# Patient Record
Sex: FEMALE | Race: WHITE
[De-identification: names, ages, dates, MRNs, and addresses within clinical notes are randomized per-mention and may not be internally consistent; named-entity substitution may affect disease eponyms.]

---
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
## 2019-09-18 ENCOUNTER — LAB REQUISITION (OUTPATIENT)
Dept: ADMINISTRATIVE | Age: 19
End: 2019-09-18
Payer: COMMERCIAL

## 2019-09-18 DIAGNOSIS — F33.2 SEVERE RECURRENT MAJOR DEPRESSION WITHOUT PSYCHOTIC FEATURES (HCC): ICD-10-CM

## 2019-09-18 DIAGNOSIS — F50.9 EATING DISORDER, UNSPECIFIED TYPE: ICD-10-CM

## 2019-09-18 LAB
B-HCG UR QL: NEGATIVE
BILIRUB UR QL: NEGATIVE
CLARITY UR: CLEAR
COLOR UR: YELLOW
GLUCOSE UR-MCNC: NEGATIVE MG/DL
KETONES UR-MCNC: NEGATIVE MG/DL
LEUKOCYTE ESTERASE UR QL STRIP.AUTO: NEGATIVE
NITRITE UR QL STRIP.AUTO: NEGATIVE
PH UR: 6 [PH] (ref 5–8)
PROT UR-MCNC: NEGATIVE MG/DL
RBC #/AREA URNS AUTO: <1 /HPF
SP GR UR STRIP: 1.02 (ref 1–1.03)
UROBILINOGEN UR STRIP-ACNC: <2
WBC #/AREA URNS AUTO: 1 /HPF

## 2019-09-18 PROCEDURE — 81025 URINE PREGNANCY TEST: CPT | Performed by: OTHER

## 2019-09-18 PROCEDURE — 81001 URINALYSIS AUTO W/SCOPE: CPT | Performed by: OTHER

## 2019-09-19 ENCOUNTER — LAB REQUISITION (OUTPATIENT)
Dept: ADMINISTRATIVE | Age: 19
End: 2019-09-19
Payer: COMMERCIAL

## 2019-09-19 DIAGNOSIS — F50.9 EATING DISORDER: ICD-10-CM

## 2019-09-19 LAB
ALBUMIN SERPL-MCNC: 3.7 G/DL (ref 3.4–5)
ALBUMIN/GLOB SERPL: 1 {RATIO} (ref 1–2)
ALP LIVER SERPL-CCNC: 93 U/L (ref 52–144)
ALT SERPL-CCNC: 26 U/L (ref 13–56)
AMYLASE SERPL-CCNC: 48 U/L (ref 25–115)
ANION GAP SERPL CALC-SCNC: 6 MMOL/L (ref 0–18)
AST SERPL-CCNC: 16 U/L (ref 15–37)
BASOPHILS # BLD AUTO: 0.04 X10(3) UL (ref 0–0.2)
BASOPHILS NFR BLD AUTO: 0.5 %
BILIRUB SERPL-MCNC: 0.3 MG/DL (ref 0.1–2)
BUN BLD-MCNC: 15 MG/DL (ref 7–18)
BUN/CREAT SERPL: 14.4 (ref 10–20)
CALCIUM BLD-MCNC: 9 MG/DL (ref 8.5–10.1)
CHLORIDE SERPL-SCNC: 108 MMOL/L (ref 98–112)
CO2 SERPL-SCNC: 28 MMOL/L (ref 21–32)
CREAT BLD-MCNC: 1.04 MG/DL (ref 0.55–1.02)
DEPRECATED RDW RBC AUTO: 39 FL (ref 35.1–46.3)
EOSINOPHIL # BLD AUTO: 0.24 X10(3) UL (ref 0–0.7)
EOSINOPHIL NFR BLD AUTO: 3.2 %
ERYTHROCYTE [DISTWIDTH] IN BLOOD BY AUTOMATED COUNT: 12.6 % (ref 11–15)
GLOBULIN PLAS-MCNC: 3.7 G/DL (ref 2.8–4.4)
GLUCOSE BLD-MCNC: 88 MG/DL (ref 70–99)
HAV IGM SER QL: 2.2 MG/DL (ref 1.6–2.6)
HCT VFR BLD AUTO: 45.7 % (ref 35–48)
HGB BLD-MCNC: 14.6 G/DL (ref 12–16)
IMM GRANULOCYTES # BLD AUTO: 0.01 X10(3) UL (ref 0–1)
IMM GRANULOCYTES NFR BLD: 0.1 %
LYMPHOCYTES # BLD AUTO: 2.32 X10(3) UL (ref 1–4)
LYMPHOCYTES NFR BLD AUTO: 31.4 %
M PROTEIN MFR SERPL ELPH: 7.4 G/DL (ref 6.4–8.2)
MCH RBC QN AUTO: 27.4 PG (ref 26–34)
MCHC RBC AUTO-ENTMCNC: 31.9 G/DL (ref 31–37)
MCV RBC AUTO: 85.7 FL (ref 80–100)
MONOCYTES # BLD AUTO: 0.39 X10(3) UL (ref 0.1–1)
MONOCYTES NFR BLD AUTO: 5.3 %
NEUTROPHILS # BLD AUTO: 4.4 X10 (3) UL (ref 1.5–7.7)
NEUTROPHILS # BLD AUTO: 4.4 X10(3) UL (ref 1.5–7.7)
NEUTROPHILS NFR BLD AUTO: 59.5 %
OSMOLALITY SERPL CALC.SUM OF ELEC: 294 MOSM/KG (ref 275–295)
PHOSPHATE SERPL-MCNC: 4.1 MG/DL (ref 2.5–4.9)
PLATELET # BLD AUTO: 318 10(3)UL (ref 150–450)
POTASSIUM SERPL-SCNC: 4.2 MMOL/L (ref 3.5–5.1)
RBC # BLD AUTO: 5.33 X10(6)UL (ref 3.8–5.3)
SODIUM SERPL-SCNC: 142 MMOL/L (ref 136–145)
TSI SER-ACNC: 2.53 MIU/ML (ref 0.36–3.74)
WBC # BLD AUTO: 7.4 X10(3) UL (ref 4–11)

## 2019-09-19 PROCEDURE — 82150 ASSAY OF AMYLASE: CPT | Performed by: OTHER

## 2019-09-19 PROCEDURE — 83735 ASSAY OF MAGNESIUM: CPT | Performed by: OTHER

## 2019-09-19 PROCEDURE — 84443 ASSAY THYROID STIM HORMONE: CPT | Performed by: OTHER

## 2019-09-19 PROCEDURE — 80053 COMPREHEN METABOLIC PANEL: CPT | Performed by: OTHER

## 2019-09-19 PROCEDURE — 85025 COMPLETE CBC W/AUTO DIFF WBC: CPT | Performed by: OTHER

## 2019-09-19 PROCEDURE — 36415 COLL VENOUS BLD VENIPUNCTURE: CPT | Performed by: OTHER

## 2019-09-19 PROCEDURE — 84100 ASSAY OF PHOSPHORUS: CPT | Performed by: OTHER

## 2019-09-20 ENCOUNTER — APPOINTMENT (OUTPATIENT)
Dept: LAB | Facility: HOSPITAL | Age: 19
End: 2019-09-20
Attending: HOSPITALIST
Payer: COMMERCIAL

## 2019-09-20 LAB
ALBUMIN SERPL-MCNC: 3.4 G/DL (ref 3.4–5)
ALBUMIN/GLOB SERPL: 1 {RATIO} (ref 1–2)
ALP LIVER SERPL-CCNC: 86 U/L (ref 52–144)
ALT SERPL-CCNC: 23 U/L (ref 13–56)
AMYLASE SERPL-CCNC: 48 U/L (ref 25–115)
ANION GAP SERPL CALC-SCNC: 5 MMOL/L (ref 0–18)
ANION GAP SERPL CALC-SCNC: 5 MMOL/L (ref 0–18)
AST SERPL-CCNC: 16 U/L (ref 15–37)
BILIRUB SERPL-MCNC: 0.1 MG/DL (ref 0.1–2)
BUN BLD-MCNC: 15 MG/DL (ref 7–18)
BUN BLD-MCNC: 15 MG/DL (ref 7–18)
BUN/CREAT SERPL: 17.6 (ref 10–20)
BUN/CREAT SERPL: 17.6 (ref 10–20)
CALCIUM BLD-MCNC: 8.8 MG/DL (ref 8.5–10.1)
CALCIUM BLD-MCNC: 8.8 MG/DL (ref 8.5–10.1)
CHLORIDE SERPL-SCNC: 110 MMOL/L (ref 98–112)
CHLORIDE SERPL-SCNC: 110 MMOL/L (ref 98–112)
CO2 SERPL-SCNC: 27 MMOL/L (ref 21–32)
CO2 SERPL-SCNC: 27 MMOL/L (ref 21–32)
CREAT BLD-MCNC: 0.85 MG/DL (ref 0.55–1.02)
CREAT BLD-MCNC: 0.85 MG/DL (ref 0.55–1.02)
DEPRECATED HBV CORE AB SER IA-ACNC: 21.9 NG/ML (ref 12–114)
GLOBULIN PLAS-MCNC: 3.3 G/DL (ref 2.8–4.4)
GLUCOSE BLD-MCNC: 87 MG/DL (ref 70–99)
GLUCOSE BLD-MCNC: 87 MG/DL (ref 70–99)
HAV IGM SER QL: 2.2 MG/DL (ref 1.6–2.6)
IRON SATURATION: 6 % (ref 15–50)
IRON SERPL-MCNC: 26 UG/DL (ref 50–170)
M PROTEIN MFR SERPL ELPH: 6.7 G/DL (ref 6.4–8.2)
OSMOLALITY SERPL CALC.SUM OF ELEC: 294 MOSM/KG (ref 275–295)
OSMOLALITY SERPL CALC.SUM OF ELEC: 294 MOSM/KG (ref 275–295)
PHOSPHATE SERPL-MCNC: 3.6 MG/DL (ref 2.5–4.9)
POTASSIUM SERPL-SCNC: 4.4 MMOL/L (ref 3.5–5.1)
POTASSIUM SERPL-SCNC: 4.4 MMOL/L (ref 3.5–5.1)
PREALB SERPL-MCNC: 20.5 MG/DL (ref 20–40)
SODIUM SERPL-SCNC: 142 MMOL/L (ref 136–145)
SODIUM SERPL-SCNC: 142 MMOL/L (ref 136–145)
TOTAL IRON BINDING CAPACITY: 429 UG/DL (ref 240–450)
TRANSFERRIN SERPL-MCNC: 288 MG/DL (ref 200–360)
VIT B12 SERPL-MCNC: 400 PG/ML (ref 193–986)

## 2019-09-20 PROCEDURE — 87591 N.GONORRHOEAE DNA AMP PROB: CPT | Performed by: HOSPITALIST

## 2019-09-20 PROCEDURE — 83540 ASSAY OF IRON: CPT | Performed by: HOSPITALIST

## 2019-09-20 PROCEDURE — 86780 TREPONEMA PALLIDUM: CPT | Performed by: HOSPITALIST

## 2019-09-20 PROCEDURE — 83735 ASSAY OF MAGNESIUM: CPT | Performed by: HOSPITALIST

## 2019-09-20 PROCEDURE — 87491 CHLMYD TRACH DNA AMP PROBE: CPT | Performed by: HOSPITALIST

## 2019-09-20 PROCEDURE — 87340 HEPATITIS B SURFACE AG IA: CPT | Performed by: HOSPITALIST

## 2019-09-20 PROCEDURE — 82607 VITAMIN B-12: CPT | Performed by: HOSPITALIST

## 2019-09-20 PROCEDURE — 86694 HERPES SIMPLEX NES ANTBDY: CPT | Performed by: HOSPITALIST

## 2019-09-20 PROCEDURE — 36415 COLL VENOUS BLD VENIPUNCTURE: CPT | Performed by: HOSPITALIST

## 2019-09-20 PROCEDURE — 82150 ASSAY OF AMYLASE: CPT | Performed by: HOSPITALIST

## 2019-09-20 PROCEDURE — 80053 COMPREHEN METABOLIC PANEL: CPT | Performed by: HOSPITALIST

## 2019-09-20 PROCEDURE — 84466 ASSAY OF TRANSFERRIN: CPT | Performed by: HOSPITALIST

## 2019-09-20 PROCEDURE — 82728 ASSAY OF FERRITIN: CPT | Performed by: HOSPITALIST

## 2019-09-20 PROCEDURE — 84134 ASSAY OF PREALBUMIN: CPT | Performed by: HOSPITALIST

## 2019-09-20 PROCEDURE — 84100 ASSAY OF PHOSPHORUS: CPT | Performed by: HOSPITALIST

## 2019-09-20 PROCEDURE — 86803 HEPATITIS C AB TEST: CPT | Performed by: HOSPITALIST

## 2019-09-20 PROCEDURE — 87389 HIV-1 AG W/HIV-1&-2 AB AG IA: CPT | Performed by: HOSPITALIST

## 2019-09-20 PROCEDURE — 82652 VIT D 1 25-DIHYDROXY: CPT | Performed by: HOSPITALIST

## 2019-09-21 LAB — 1,25-DIHYDROXYVITAMIN D: 37.9 PG/ML

## 2019-09-25 ENCOUNTER — LAB REQUISITION (OUTPATIENT)
Dept: ADMINISTRATIVE | Age: 19
End: 2019-09-25
Payer: COMMERCIAL

## 2019-09-25 DIAGNOSIS — F50.9 EATING DISORDER: ICD-10-CM

## 2019-09-26 LAB
AMYLASE SERPL-CCNC: 55 U/L (ref 25–115)
ANION GAP SERPL CALC-SCNC: 3 MMOL/L (ref 0–18)
BUN BLD-MCNC: 16 MG/DL (ref 7–18)
BUN/CREAT SERPL: 17.6 (ref 10–20)
CALCIUM BLD-MCNC: 9.1 MG/DL (ref 8.5–10.1)
CHLORIDE SERPL-SCNC: 108 MMOL/L (ref 98–112)
CO2 SERPL-SCNC: 31 MMOL/L (ref 21–32)
CREAT BLD-MCNC: 0.91 MG/DL (ref 0.55–1.02)
GLUCOSE BLD-MCNC: 75 MG/DL (ref 70–99)
HAV IGM SER QL: 2.1 MG/DL (ref 1.6–2.6)
OSMOLALITY SERPL CALC.SUM OF ELEC: 294 MOSM/KG (ref 275–295)
PHOSPHATE SERPL-MCNC: 3.4 MG/DL (ref 2.5–4.9)
POTASSIUM SERPL-SCNC: 4.4 MMOL/L (ref 3.5–5.1)
SODIUM SERPL-SCNC: 142 MMOL/L (ref 136–145)

## 2019-09-26 PROCEDURE — 80048 BASIC METABOLIC PNL TOTAL CA: CPT | Performed by: OTHER

## 2019-09-26 PROCEDURE — 84100 ASSAY OF PHOSPHORUS: CPT | Performed by: OTHER

## 2019-09-26 PROCEDURE — 83735 ASSAY OF MAGNESIUM: CPT | Performed by: OTHER

## 2019-09-26 PROCEDURE — 82150 ASSAY OF AMYLASE: CPT | Performed by: OTHER

## 2019-10-09 ENCOUNTER — LAB REQUISITION (OUTPATIENT)
Dept: ADMINISTRATIVE | Age: 19
End: 2019-10-09
Payer: COMMERCIAL

## 2019-10-09 DIAGNOSIS — F39 MOOD DISORDER (HCC): ICD-10-CM

## 2019-10-09 DIAGNOSIS — A15.9 TB (TUBERCULOSIS): ICD-10-CM

## 2019-10-10 ENCOUNTER — LAB REQUISITION (OUTPATIENT)
Dept: ADMINISTRATIVE | Age: 19
End: 2019-10-10
Payer: COMMERCIAL

## 2019-10-10 DIAGNOSIS — F39 MOOD DISORDER (HCC): ICD-10-CM

## 2019-10-10 DIAGNOSIS — E46 UNSPECIFIED PROTEIN-CALORIE MALNUTRITION (HCC): ICD-10-CM

## 2019-10-10 PROCEDURE — 86480 TB TEST CELL IMMUN MEASURE: CPT | Performed by: OTHER

## 2019-10-10 PROCEDURE — 84443 ASSAY THYROID STIM HORMONE: CPT | Performed by: OTHER

## 2019-10-10 PROCEDURE — 84100 ASSAY OF PHOSPHORUS: CPT | Performed by: OTHER

## 2019-10-10 PROCEDURE — 83735 ASSAY OF MAGNESIUM: CPT | Performed by: OTHER

## 2019-10-10 PROCEDURE — 82150 ASSAY OF AMYLASE: CPT | Performed by: OTHER

## 2019-10-10 PROCEDURE — 36415 COLL VENOUS BLD VENIPUNCTURE: CPT | Performed by: OTHER

## 2019-10-10 PROCEDURE — 80053 COMPREHEN METABOLIC PANEL: CPT | Performed by: OTHER

## 2019-10-10 PROCEDURE — 85025 COMPLETE CBC W/AUTO DIFF WBC: CPT | Performed by: OTHER

## 2019-10-14 PROCEDURE — 80048 BASIC METABOLIC PNL TOTAL CA: CPT | Performed by: HOSPITALIST

## 2019-10-14 PROCEDURE — 82607 VITAMIN B-12: CPT | Performed by: HOSPITALIST

## 2019-10-14 PROCEDURE — 83540 ASSAY OF IRON: CPT | Performed by: HOSPITALIST

## 2019-10-14 PROCEDURE — 82728 ASSAY OF FERRITIN: CPT | Performed by: HOSPITALIST

## 2019-10-14 PROCEDURE — 36415 COLL VENOUS BLD VENIPUNCTURE: CPT | Performed by: HOSPITALIST

## 2019-10-14 PROCEDURE — 84466 ASSAY OF TRANSFERRIN: CPT | Performed by: HOSPITALIST

## 2019-10-14 PROCEDURE — 84134 ASSAY OF PREALBUMIN: CPT | Performed by: HOSPITALIST

## 2020-06-08 ENCOUNTER — EMERGENCY (EMERGENCY)
Facility: HOSPITAL | Age: 20
LOS: 1 days | Discharge: ANOTHER HOSPITAL | End: 2020-06-08
Admitting: EMERGENCY MEDICINE
Payer: COMMERCIAL

## 2020-06-08 ENCOUNTER — OUTPATIENT (OUTPATIENT)
Dept: OUTPATIENT SERVICES | Facility: HOSPITAL | Age: 20
LOS: 1 days | Discharge: STILL ADMITTED OR ADMINISTRATIVE | End: 2020-06-08

## 2020-06-08 VITALS
SYSTOLIC BLOOD PRESSURE: 106 MMHG | TEMPERATURE: 98 F | HEART RATE: 68 BPM | RESPIRATION RATE: 16 BRPM | DIASTOLIC BLOOD PRESSURE: 70 MMHG | OXYGEN SATURATION: 100 %

## 2020-06-08 VITALS
RESPIRATION RATE: 16 BRPM | OXYGEN SATURATION: 100 % | TEMPERATURE: 99 F | DIASTOLIC BLOOD PRESSURE: 80 MMHG | SYSTOLIC BLOOD PRESSURE: 126 MMHG | HEART RATE: 88 BPM

## 2020-06-08 DIAGNOSIS — F84.0 AUTISTIC DISORDER: ICD-10-CM

## 2020-06-08 DIAGNOSIS — F32.9 MAJOR DEPRESSIVE DISORDER, SINGLE EPISODE, UNSPECIFIED: ICD-10-CM

## 2020-06-08 DIAGNOSIS — F33.2 MAJOR DEPRESSIVE DISORDER, RECURRENT SEVERE WITHOUT PSYCHOTIC FEATURES: ICD-10-CM

## 2020-06-08 LAB
ALBUMIN SERPL ELPH-MCNC: 4.1 G/DL — SIGNIFICANT CHANGE UP (ref 3.3–5)
ALP SERPL-CCNC: 69 U/L — SIGNIFICANT CHANGE UP (ref 40–120)
ALT FLD-CCNC: 12 U/L — SIGNIFICANT CHANGE UP (ref 4–33)
AMPHET UR-MCNC: NEGATIVE — SIGNIFICANT CHANGE UP
ANION GAP SERPL CALC-SCNC: 13 MMO/L — SIGNIFICANT CHANGE UP (ref 7–14)
APAP SERPL-MCNC: < 15 UG/ML — LOW (ref 15–25)
APPEARANCE UR: CLEAR — SIGNIFICANT CHANGE UP
AST SERPL-CCNC: 11 U/L — SIGNIFICANT CHANGE UP (ref 4–32)
BACTERIA # UR AUTO: SIGNIFICANT CHANGE UP
BARBITURATES UR SCN-MCNC: NEGATIVE — SIGNIFICANT CHANGE UP
BASOPHILS # BLD AUTO: 0.03 K/UL — SIGNIFICANT CHANGE UP (ref 0–0.2)
BASOPHILS NFR BLD AUTO: 0.3 % — SIGNIFICANT CHANGE UP (ref 0–2)
BENZODIAZ UR-MCNC: NEGATIVE — SIGNIFICANT CHANGE UP
BILIRUB SERPL-MCNC: < 0.2 MG/DL — LOW (ref 0.2–1.2)
BILIRUB UR-MCNC: NEGATIVE — SIGNIFICANT CHANGE UP
BLOOD UR QL VISUAL: NEGATIVE — SIGNIFICANT CHANGE UP
BUN SERPL-MCNC: 12 MG/DL — SIGNIFICANT CHANGE UP (ref 7–23)
CALCIUM SERPL-MCNC: 9.4 MG/DL — SIGNIFICANT CHANGE UP (ref 8.4–10.5)
CANNABINOIDS UR-MCNC: NEGATIVE — SIGNIFICANT CHANGE UP
CHLORIDE SERPL-SCNC: 105 MMOL/L — SIGNIFICANT CHANGE UP (ref 98–107)
CO2 SERPL-SCNC: 21 MMOL/L — LOW (ref 22–31)
COCAINE METAB.OTHER UR-MCNC: NEGATIVE — SIGNIFICANT CHANGE UP
COLOR SPEC: YELLOW — SIGNIFICANT CHANGE UP
CREAT SERPL-MCNC: 0.88 MG/DL — SIGNIFICANT CHANGE UP (ref 0.5–1.3)
EOSINOPHIL # BLD AUTO: 0.24 K/UL — SIGNIFICANT CHANGE UP (ref 0–0.5)
EOSINOPHIL NFR BLD AUTO: 2.5 % — SIGNIFICANT CHANGE UP (ref 0–6)
ETHANOL BLD-MCNC: < 10 MG/DL — SIGNIFICANT CHANGE UP
GLUCOSE SERPL-MCNC: 129 MG/DL — HIGH (ref 70–99)
GLUCOSE UR-MCNC: NEGATIVE — SIGNIFICANT CHANGE UP
HCG UR-SCNC: NEGATIVE — SIGNIFICANT CHANGE UP
HCT VFR BLD CALC: 38.6 % — SIGNIFICANT CHANGE UP (ref 34.5–45)
HGB BLD-MCNC: 12.8 G/DL — SIGNIFICANT CHANGE UP (ref 11.5–15.5)
HYALINE CASTS # UR AUTO: NEGATIVE — SIGNIFICANT CHANGE UP
IMM GRANULOCYTES NFR BLD AUTO: 0.3 % — SIGNIFICANT CHANGE UP (ref 0–1.5)
KETONES UR-MCNC: NEGATIVE — SIGNIFICANT CHANGE UP
LEUKOCYTE ESTERASE UR-ACNC: SIGNIFICANT CHANGE UP
LYMPHOCYTES # BLD AUTO: 2.19 K/UL — SIGNIFICANT CHANGE UP (ref 1–3.3)
LYMPHOCYTES # BLD AUTO: 22.5 % — SIGNIFICANT CHANGE UP (ref 13–44)
MCHC RBC-ENTMCNC: 27.4 PG — SIGNIFICANT CHANGE UP (ref 27–34)
MCHC RBC-ENTMCNC: 33.2 % — SIGNIFICANT CHANGE UP (ref 32–36)
MCV RBC AUTO: 82.5 FL — SIGNIFICANT CHANGE UP (ref 80–100)
METHADONE UR-MCNC: NEGATIVE — SIGNIFICANT CHANGE UP
MONOCYTES # BLD AUTO: 0.55 K/UL — SIGNIFICANT CHANGE UP (ref 0–0.9)
MONOCYTES NFR BLD AUTO: 5.6 % — SIGNIFICANT CHANGE UP (ref 2–14)
NEUTROPHILS # BLD AUTO: 6.7 K/UL — SIGNIFICANT CHANGE UP (ref 1.8–7.4)
NEUTROPHILS NFR BLD AUTO: 68.8 % — SIGNIFICANT CHANGE UP (ref 43–77)
NITRITE UR-MCNC: NEGATIVE — SIGNIFICANT CHANGE UP
NRBC # FLD: 0 K/UL — SIGNIFICANT CHANGE UP (ref 0–0)
OPIATES UR-MCNC: NEGATIVE — SIGNIFICANT CHANGE UP
OXYCODONE UR-MCNC: NEGATIVE — SIGNIFICANT CHANGE UP
PCP UR-MCNC: NEGATIVE — SIGNIFICANT CHANGE UP
PH UR: 6 — SIGNIFICANT CHANGE UP (ref 5–8)
PLATELET # BLD AUTO: 307 K/UL — SIGNIFICANT CHANGE UP (ref 150–400)
PMV BLD: 10.3 FL — SIGNIFICANT CHANGE UP (ref 7–13)
POTASSIUM SERPL-MCNC: 3.9 MMOL/L — SIGNIFICANT CHANGE UP (ref 3.5–5.3)
POTASSIUM SERPL-SCNC: 3.9 MMOL/L — SIGNIFICANT CHANGE UP (ref 3.5–5.3)
PROT SERPL-MCNC: 7 G/DL — SIGNIFICANT CHANGE UP (ref 6–8.3)
PROT UR-MCNC: 20 — SIGNIFICANT CHANGE UP
RBC # BLD: 4.68 M/UL — SIGNIFICANT CHANGE UP (ref 3.8–5.2)
RBC # FLD: 13.2 % — SIGNIFICANT CHANGE UP (ref 10.3–14.5)
RBC CASTS # UR COMP ASSIST: SIGNIFICANT CHANGE UP (ref 0–?)
SALICYLATES SERPL-MCNC: < 5 MG/DL — LOW (ref 15–30)
SARS-COV-2 RNA SPEC QL NAA+PROBE: SIGNIFICANT CHANGE UP
SODIUM SERPL-SCNC: 139 MMOL/L — SIGNIFICANT CHANGE UP (ref 135–145)
SP GR SPEC: 1.03 — SIGNIFICANT CHANGE UP (ref 1–1.04)
SQUAMOUS # UR AUTO: SIGNIFICANT CHANGE UP
TSH SERPL-MCNC: 2.47 UIU/ML — SIGNIFICANT CHANGE UP (ref 0.27–4.2)
UROBILINOGEN FLD QL: NORMAL — SIGNIFICANT CHANGE UP
WBC # BLD: 9.74 K/UL — SIGNIFICANT CHANGE UP (ref 3.8–10.5)
WBC # FLD AUTO: 9.74 K/UL — SIGNIFICANT CHANGE UP (ref 3.8–10.5)
WBC UR QL: HIGH (ref 0–?)

## 2020-06-08 PROCEDURE — 99285 EMERGENCY DEPT VISIT HI MDM: CPT | Mod: GC

## 2020-06-08 PROCEDURE — 99285 EMERGENCY DEPT VISIT HI MDM: CPT | Mod: 25

## 2020-06-08 PROCEDURE — 93010 ELECTROCARDIOGRAM REPORT: CPT

## 2020-06-08 NOTE — ED BEHAVIORAL HEALTH ASSESSMENT NOTE - PSYCHIATRIC ISSUES AND PLAN (INCLUDE STANDING AND PRN MEDICATION)
depression depression - increase Prozac to 30, continue Latuda for now, clarify its indication with outpatient psych, Ativan 2mg IM agitation

## 2020-06-08 NOTE — ED BEHAVIORAL HEALTH ASSESSMENT NOTE - DESCRIPTION
Pt is calm and cooperative.    Vital Signs Last 24 Hrs  T(C): 37.1 (08 Jun 2020 16:21), Max: 37.1 (08 Jun 2020 16:21)  T(F): 98.7 (08 Jun 2020 16:21), Max: 98.7 (08 Jun 2020 16:21)  HR: 88 (08 Jun 2020 16:21) (88 - 88)  BP: 126/80 (08 Jun 2020 16:21) (126/80 - 126/80)  BP(mean): --  RR: 16 (08 Jun 2020 16:21) (16 - 16)  SpO2: 100% (08 Jun 2020 16:21) (100% - 100%) none living with family in family's home, no substance use

## 2020-06-08 NOTE — ED BEHAVIORAL HEALTH ASSESSMENT NOTE - PRIMARY DX
Depression, unspecified depression type Major depressive disorder, recurrent, severe without psychotic features

## 2020-06-08 NOTE — ED ADULT NURSE NOTE - CHIEF COMPLAINT QUOTE
Pt c/o increased depression over past few weeks, this past week has been having suicidal thoughts. Denies plan/intent. Hx: Depression, states "I feel like my meds aren't working anymore." Reports previous psych admission to [facility]. Pt is calm/cooperative in triage. Denies HI/ETOH/substance use/Auditory or visual hallucinations.    Mom reports pt has hx of autism.     Brittani Marianne (mom) [phone]

## 2020-06-08 NOTE — ED BEHAVIORAL HEALTH NOTE - BEHAVIORAL HEALTH NOTE
Worker called [facility] and spoke to Maribel who took patient information for bed. Pending review. Awaiting psychiatric assessment. Worker called [facility] and spoke to Dinh who took patient information for bed. Pending review. Awaiting psychiatric assessment. Worker faxed ekg to [phone].

## 2020-06-08 NOTE — ED ADULT NURSE NOTE - OBJECTIVE STATEMENT
Pt arrived to  c/o worsening depression and SI with passive plan. Pt denies HI/AH/VH. Pt changed into  clothing. Personal property collected and logged.

## 2020-06-08 NOTE — ED PROVIDER NOTE - CLINICAL SUMMARY MEDICAL DECISION MAKING FREE TEXT BOX
19 year old female history of anxiety, depression, borderline personality disorder, autism presents for SI with plan.  Medical evaluation performed. There is no clinical evidence of intoxication or any acute medical problem requiring immediate intervention. Patient is awaiting psychiatric consultation. Final disposition will be determined by psychiatrist. 19 year old female history of anxiety, depression, borderline personality disorder, autism presents for SI with plan.  Medical evaluation performed. There is no clinical evidence of intoxication or any acute medical problem requiring immediate intervention. Patient is awaiting psychiatric consultation. Final disposition will be determined by psychiatrist.- recommendation inpatient treatment, will transfer to [facility].
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
## 2020-06-08 NOTE — ED BEHAVIORAL HEALTH ASSESSMENT NOTE - SUICIDE RISK FACTORS
Hopelessness or despair/Current mood episode/Other Current mood episode/Anhedonia/Hopelessness or despair/Mood Disorder current/past/Cluster B Personality disorders or traits current/past/Other

## 2020-06-08 NOTE — ED ADULT TRIAGE NOTE - CHIEF COMPLAINT QUOTE
Pt c/o increased depression over past few weeks, this past week has been having suicidal thoughts. Denies plan/intent. Hx: Depression, states "I feel like my meds aren't working anymore." Reports previous psych admission to [facility]. Pt is calm/cooperative in triage. Denies HI/ETOH/substance use/Auditory or visual hallucinations.    Brittani Lopes (mom) [phone] Pt c/o increased depression over past few weeks, this past week has been having suicidal thoughts. Denies plan/intent. Hx: Depression, states "I feel like my meds aren't working anymore." Reports previous psych admission to [facility]. Pt is calm/cooperative in triage. Denies HI/ETOH/substance use/Auditory or visual hallucinations.    Mom reports pt has hx of autism.     Brittani Marianne (mom) [phone]

## 2020-06-08 NOTE — ED PROVIDER NOTE - GASTROINTESTINAL, MLM
Noted. Spoke with patient's daughter Red on phone.   Also left message for May on voice mail.    Abdomen soft, non-tender, no guarding.

## 2020-06-08 NOTE — ED PROVIDER NOTE - CARE PLAN
Principal Discharge DX:	Depression, unspecified depression type  Secondary Diagnosis:	Major depressive disorder, recurrent, severe without psychotic features  Secondary Diagnosis:	Autism spectrum disorder

## 2020-06-08 NOTE — ED PROVIDER NOTE - OBJECTIVE STATEMENT
19 year old female history of anxiety, depression, borderline personality disorder, autism presents for SI.  Patient states that she has had social stressors that have been causing her to want to harm herself.  Endorses plan [place].  Patient denies HI/AH/VH.  Patient denies illicit drugs or ETOH, no physical complaints or concerns.  States she is complaint with her meds (Prozac and Latuda)

## 2020-06-08 NOTE — ED BEHAVIORAL HEALTH NOTE - BEHAVIORAL HEALTH NOTE
Writer contacted [facility] #[phone] and spoke with Dianna who confirmed pt's acceptance reports pt all set for transfer. Accepting provider is Dr. Jesse Selby. Writer contacted [facility] #[phone] and spoke with Dianna who confirmed pt's acceptance reports pt all set for transfer. Accepting provider is Dr. Jesse Selby.    Writer called [facility] and received the follow auth:     [place]  # [phone]  9 day auth from 6/8 to 6/16   Auth #[id number]  Next review due 6/16   Concurrent reviewer TBA    Pt's mother notified of transfer at [phone]

## 2020-06-08 NOTE — ED BEHAVIORAL HEALTH ASSESSMENT NOTE - HPI (INCLUDE ILLNESS QUALITY, SEVERITY, DURATION, TIMING, CONTEXT, MODIFYING FACTORS, ASSOCIATED SIGNS AND SYMPTOMS)
19 yo female with pphx of autism spectrum disorder, and borderline personality disorder, with two previous suicide attempts in 2016 - OD (no hospitalization req), 2019 - trying to drown self in bathtub, two previous hospitalizations in [facility], coming with in two weeks of depression and suicidal thoughts [place].     On interview patient describes feeling extremely depressed, over sleeping, self isolating, over eating, lack of motivation. two to three days after starting to feel depressed, she developed suicidal thought of [place]. She has come as close as walking out of the door once but instead she talked to her mother about it.     Pt denies any arturo, or psychotic sxs. She denies substance use.     Collateral from mother: Pt's mother states patient asked mom to take her to the hospital today, which mother states is a big deal. She is used to her daughter having fluctuating moods however usually by the time going to the hospital becomes the topic of discussion, pt states she is feeling better and is not suicidal. Hence mom states she is worried about her daughter's safety if she were to be released. 19 yo female with pphx of depresion, autism spectrum disorder, and borderline personality disorder, with two previous suicide attempts in 2016 - OD (no hospitalization req), 2019 - trying to drown self in bathtub, two previous hospitalizations in [facility], no substance history, no violence/legal issues, no PMH, coming with in two weeks of depression and suicidal thoughts [place].     On interview patient describes feeling extremely depressed, over sleeping, self isolating, over eating, lack of motivation. two to three days after starting to feel depressed, she developed suicidal thought of [place]. She has come as close as walking out of the door once but instead she talked to her mother about it. No identifiable trigger. Compliant with medication.     Pt denies any arturo, or psychotic sxs. She denies substance use. Denies panic attacks.    Collateral from mother: Pt's mother states patient asked mom to take her to the hospital today, which mother states is a big deal. She is used to her daughter having fluctuating moods however usually by the time going to the hospital becomes the topic of discussion, pt states she is feeling better and is not suicidal. Hence mom states she is worried about her daughter's safety if she were to be released.

## 2020-06-08 NOTE — ED BEHAVIORAL HEALTH ASSESSMENT NOTE - RISK ASSESSMENT
Risk factors: baseline mood disorder, previous suicide attempts, previous inpatient hospitalizations, autism spec disorder. Moderate Acute Suicide Risk Risk factors: baseline mood disorder, previous suicide attempts, previous inpatient hospitalizations, autism spec disorder.  Acute risks: depressive episode, suicidal thoughts with plan and intent.  Protective factors: no known access to weapons, no history of violence, no substance use, no panic.  Imminent suicide risk is MODERATE at this time, plan to mitigate with inpatient level of care for safety and stabilization.

## 2020-06-08 NOTE — ED BEHAVIORAL HEALTH ASSESSMENT NOTE - CASE SUMMARY
21 yo female with pphx of depresion, autism spectrum disorder, and borderline personality disorder, with two previous suicide attempts in 2016 - OD (no hospitalization req), 2019 - trying to drown self in bathtub, two previous hospitalizations in [facility], no substance history, no violence/legal issues, no PMH, coming with in two weeks of depression and suicidal thoughts [place]. Endorses symptoms consistent with major depressive episode, unable to verbalize a trigger. Per pt, mom [place], otherwise she thinks she would've attempted suicide. No arturo/psychosis/substance. No homicidal ideation or violence. No medical issues. She is requesting hospitalization and based on assessment, voluntary admission is appropriate. I agree with plan

## 2020-06-08 NOTE — ED ADULT NURSE REASSESSMENT NOTE - NS ED NURSE REASSESS COMMENT FT1
Pt currently occupying  room 3. Pt calm and cooperative at this time. Vitals taken and recorded. Pt made aware that covid results are pending, after which she will be admitted. Pt verbalized an understanding. Pt denies current questions/concerns. Assessment ongoing.

## 2020-06-19 ENCOUNTER — OUTPATIENT (OUTPATIENT)
Dept: OUTPATIENT SERVICES | Facility: HOSPITAL | Age: 20
LOS: 1 days | Discharge: HOME | End: 2020-06-19

## 2020-06-19 DIAGNOSIS — F60.3 BORDERLINE PERSONALITY DISORDER: ICD-10-CM

## 2020-06-19 DIAGNOSIS — F33.9 MAJOR DEPRESSIVE DISORDER, RECURRENT, UNSPECIFIED: ICD-10-CM

## 2020-06-19 PROBLEM — F32.9 MAJOR DEPRESSIVE DISORDER, SINGLE EPISODE, UNSPECIFIED: Chronic | Status: ACTIVE | Noted: 2020-06-08

## 2021-09-23 ENCOUNTER — EMERGENCY (EMERGENCY)
Facility: HOSPITAL | Age: 21
LOS: 0 days | Discharge: HOME | End: 2021-09-23
Attending: STUDENT IN AN ORGANIZED HEALTH CARE EDUCATION/TRAINING PROGRAM
Payer: COMMERCIAL

## 2021-09-23 VITALS
OXYGEN SATURATION: 100 % | TEMPERATURE: 98 F | HEART RATE: 79 BPM | DIASTOLIC BLOOD PRESSURE: 72 MMHG | SYSTOLIC BLOOD PRESSURE: 110 MMHG | RESPIRATION RATE: 18 BRPM

## 2021-09-23 VITALS
TEMPERATURE: 98 F | HEART RATE: 91 BPM | RESPIRATION RATE: 18 BRPM | DIASTOLIC BLOOD PRESSURE: 74 MMHG | HEIGHT: 65 IN | WEIGHT: 184.97 LBS | OXYGEN SATURATION: 95 % | SYSTOLIC BLOOD PRESSURE: 118 MMHG

## 2021-09-23 DIAGNOSIS — R07.89 OTHER CHEST PAIN: ICD-10-CM

## 2021-09-23 DIAGNOSIS — Z20.822 CONTACT WITH AND (SUSPECTED) EXPOSURE TO COVID-19: ICD-10-CM

## 2021-09-23 DIAGNOSIS — F41.8 OTHER SPECIFIED ANXIETY DISORDERS: ICD-10-CM

## 2021-09-23 DIAGNOSIS — R06.02 SHORTNESS OF BREATH: ICD-10-CM

## 2021-09-23 DIAGNOSIS — F84.0 AUTISTIC DISORDER: ICD-10-CM

## 2021-09-23 LAB
ALBUMIN SERPL ELPH-MCNC: 3.5 G/DL — SIGNIFICANT CHANGE UP (ref 3.3–5)
ALP SERPL-CCNC: 90 U/L — SIGNIFICANT CHANGE UP (ref 40–120)
ALT FLD-CCNC: 27 U/L — SIGNIFICANT CHANGE UP (ref 12–78)
ANION GAP SERPL CALC-SCNC: 4 MMOL/L — LOW (ref 5–17)
APPEARANCE UR: CLEAR — SIGNIFICANT CHANGE UP
APTT BLD: 31.9 SEC — SIGNIFICANT CHANGE UP (ref 27.5–35.5)
AST SERPL-CCNC: 15 U/L — SIGNIFICANT CHANGE UP (ref 15–37)
BASOPHILS # BLD AUTO: 0.07 K/UL — SIGNIFICANT CHANGE UP (ref 0–0.2)
BASOPHILS NFR BLD AUTO: 0.6 % — SIGNIFICANT CHANGE UP (ref 0–2)
BILIRUB SERPL-MCNC: 0.1 MG/DL — LOW (ref 0.2–1.2)
BILIRUB UR-MCNC: NEGATIVE — SIGNIFICANT CHANGE UP
BUN SERPL-MCNC: 12 MG/DL — SIGNIFICANT CHANGE UP (ref 7–23)
CALCIUM SERPL-MCNC: 9.3 MG/DL — SIGNIFICANT CHANGE UP (ref 8.5–10.1)
CHLORIDE SERPL-SCNC: 110 MMOL/L — HIGH (ref 96–108)
CO2 SERPL-SCNC: 27 MMOL/L — SIGNIFICANT CHANGE UP (ref 22–31)
COLOR SPEC: YELLOW — SIGNIFICANT CHANGE UP
CREAT SERPL-MCNC: 1.03 MG/DL — SIGNIFICANT CHANGE UP (ref 0.5–1.3)
D DIMER BLD IA.RAPID-MCNC: <150 NG/ML DDU — SIGNIFICANT CHANGE UP
DIFF PNL FLD: NEGATIVE — SIGNIFICANT CHANGE UP
EOSINOPHIL # BLD AUTO: 0.36 K/UL — SIGNIFICANT CHANGE UP (ref 0–0.5)
EOSINOPHIL NFR BLD AUTO: 2.9 % — SIGNIFICANT CHANGE UP (ref 0–6)
GLUCOSE SERPL-MCNC: 96 MG/DL — SIGNIFICANT CHANGE UP (ref 70–99)
GLUCOSE UR QL: NEGATIVE MG/DL — SIGNIFICANT CHANGE UP
HCT VFR BLD CALC: 40.9 % — SIGNIFICANT CHANGE UP (ref 34.5–45)
HGB BLD-MCNC: 12.3 G/DL — SIGNIFICANT CHANGE UP (ref 11.5–15.5)
IMM GRANULOCYTES NFR BLD AUTO: 0.5 % — SIGNIFICANT CHANGE UP (ref 0–1.5)
INR BLD: 1.25 RATIO — HIGH (ref 0.88–1.16)
KETONES UR-MCNC: NEGATIVE — SIGNIFICANT CHANGE UP
LEUKOCYTE ESTERASE UR-ACNC: NEGATIVE — SIGNIFICANT CHANGE UP
LITHIUM SERPL-MCNC: 0.4 MMOL/L — LOW (ref 0.6–1.2)
LYMPHOCYTES # BLD AUTO: 18.2 % — SIGNIFICANT CHANGE UP (ref 13–44)
LYMPHOCYTES # BLD AUTO: 2.25 K/UL — SIGNIFICANT CHANGE UP (ref 1–3.3)
MCHC RBC-ENTMCNC: 24.8 PG — LOW (ref 27–34)
MCHC RBC-ENTMCNC: 30.1 GM/DL — LOW (ref 32–36)
MCV RBC AUTO: 82.5 FL — SIGNIFICANT CHANGE UP (ref 80–100)
MONOCYTES # BLD AUTO: 0.46 K/UL — SIGNIFICANT CHANGE UP (ref 0–0.9)
MONOCYTES NFR BLD AUTO: 3.7 % — SIGNIFICANT CHANGE UP (ref 2–14)
NEUTROPHILS # BLD AUTO: 9.18 K/UL — HIGH (ref 1.8–7.4)
NEUTROPHILS NFR BLD AUTO: 74.1 % — SIGNIFICANT CHANGE UP (ref 43–77)
NITRITE UR-MCNC: NEGATIVE — SIGNIFICANT CHANGE UP
PH UR: 5 — SIGNIFICANT CHANGE UP (ref 5–8)
PLATELET # BLD AUTO: 374 K/UL — SIGNIFICANT CHANGE UP (ref 150–400)
POTASSIUM SERPL-MCNC: 3.9 MMOL/L — SIGNIFICANT CHANGE UP (ref 3.5–5.3)
POTASSIUM SERPL-SCNC: 3.9 MMOL/L — SIGNIFICANT CHANGE UP (ref 3.5–5.3)
PROT SERPL-MCNC: 7.3 GM/DL — SIGNIFICANT CHANGE UP (ref 6–8.3)
PROT UR-MCNC: NEGATIVE MG/DL — SIGNIFICANT CHANGE UP
PROTHROM AB SERPL-ACNC: 14.4 SEC — HIGH (ref 10.6–13.6)
RBC # BLD: 4.96 M/UL — SIGNIFICANT CHANGE UP (ref 3.8–5.2)
RBC # FLD: 14.7 % — HIGH (ref 10.3–14.5)
SARS-COV-2 RNA SPEC QL NAA+PROBE: SIGNIFICANT CHANGE UP
SODIUM SERPL-SCNC: 141 MMOL/L — SIGNIFICANT CHANGE UP (ref 135–145)
SP GR SPEC: 1.01 — SIGNIFICANT CHANGE UP (ref 1.01–1.02)
TROPONIN I SERPL-MCNC: <0.015 NG/ML — SIGNIFICANT CHANGE UP (ref 0.01–0.04)
UROBILINOGEN FLD QL: NEGATIVE MG/DL — SIGNIFICANT CHANGE UP
WBC # BLD: 12.38 K/UL — HIGH (ref 3.8–10.5)
WBC # FLD AUTO: 12.38 K/UL — HIGH (ref 3.8–10.5)

## 2021-09-23 PROCEDURE — 85610 PROTHROMBIN TIME: CPT

## 2021-09-23 PROCEDURE — 86900 BLOOD TYPING SEROLOGIC ABO: CPT

## 2021-09-23 PROCEDURE — 81003 URINALYSIS AUTO W/O SCOPE: CPT

## 2021-09-23 PROCEDURE — 99283 EMERGENCY DEPT VISIT LOW MDM: CPT | Mod: 25

## 2021-09-23 PROCEDURE — 36415 COLL VENOUS BLD VENIPUNCTURE: CPT

## 2021-09-23 PROCEDURE — 86850 RBC ANTIBODY SCREEN: CPT

## 2021-09-23 PROCEDURE — U0003: CPT

## 2021-09-23 PROCEDURE — 93005 ELECTROCARDIOGRAM TRACING: CPT

## 2021-09-23 PROCEDURE — 71045 X-RAY EXAM CHEST 1 VIEW: CPT | Mod: 26

## 2021-09-23 PROCEDURE — 99285 EMERGENCY DEPT VISIT HI MDM: CPT | Mod: CS

## 2021-09-23 PROCEDURE — 71045 X-RAY EXAM CHEST 1 VIEW: CPT

## 2021-09-23 PROCEDURE — 85025 COMPLETE CBC W/AUTO DIFF WBC: CPT

## 2021-09-23 PROCEDURE — 93010 ELECTROCARDIOGRAM REPORT: CPT

## 2021-09-23 PROCEDURE — 84484 ASSAY OF TROPONIN QUANT: CPT

## 2021-09-23 PROCEDURE — 85379 FIBRIN DEGRADATION QUANT: CPT

## 2021-09-23 PROCEDURE — 86901 BLOOD TYPING SEROLOGIC RH(D): CPT

## 2021-09-23 PROCEDURE — 80053 COMPREHEN METABOLIC PANEL: CPT

## 2021-09-23 PROCEDURE — 85730 THROMBOPLASTIN TIME PARTIAL: CPT

## 2021-09-23 PROCEDURE — 87086 URINE CULTURE/COLONY COUNT: CPT

## 2021-09-23 PROCEDURE — 80178 ASSAY OF LITHIUM: CPT

## 2021-09-23 PROCEDURE — U0005: CPT

## 2021-09-23 NOTE — ED ADULT NURSE NOTE - NSIMPLEMENTINTERV_GEN_ALL_ED
Implemented All Fall with Harm Risk Interventions:  [place] to call system. Call bell, personal items and telephone within reach. Instruct patient to call for assistance. Room bathroom lighting operational. Non-slip footwear when patient is off stretcher. Physically safe environment: no spills, clutter or unnecessary equipment. Stretcher in lowest position, wheels locked, appropriate side rails in place. Provide visual cue, wrist band, yellow gown, etc. Monitor gait and stability. Monitor for mental status changes and reorient to person, place, and time. Review medications for side effects contributing to fall risk. Reinforce activity limits and safety measures with patient and family. Provide visual clues: red socks.

## 2021-09-23 NOTE — ED PROVIDER NOTE - NSFOLLOWUPINSTRUCTIONS_ED_ALL_ED_FT
Please follow up with your Primary MD in 1-2 days. May take ibuprofen/tylenol every 6 hours as needed for pain. Return to ED immediately for any new or concerning symptoms or worsening symptoms    Chest Pain    WHAT YOU NEED TO KNOW:    Chest pain can be caused by a range of conditions, from not serious to life-threatening. Chest pain can be a symptom of a digestive problem, such as acid reflux or a stomach ulcer. An anxiety attack or a strong emotion, such as anger, can also cause chest pain. Infection, inflammation, or a fracture in the bones or cartilage in your chest can cause pain or discomfort. Sometimes chest pain or pressure is caused by poor blood flow to your heart (angina). Chest pain may also be caused by life-threatening conditions such as a heart attack or blood clot in your lungs.     DISCHARGE INSTRUCTIONS:    Call 911 if:     You have any of the following signs of a heart attack:   Squeezing, pressure, or pain in your chest       and any of the following:   Discomfort or pain in your back, neck, jaw, stomach, or arm       Shortness of breath      Nausea or vomiting      Lightheadedness or a sudden cold sweat        Return to the emergency department if:     You have chest discomfort that gets worse, even with medicine.      You cough or vomit blood.       Your bowel movements are black or bloody.       You cannot stop vomiting, or it hurts to swallow.     Contact your healthcare provider if:     You have questions or concerns about your condition or care.        Medicines:     Medicines may be given to treat the cause of your chest pain. Examples include pain medicine, anxiety medicine, or medicines to increase blood flow to your heart.       Do not take certain medicines without asking your healthcare provider first. These include NSAIDs, herbal or vitamin supplements, or hormones (estrogen or progestin).       Take your medicine as directed. Contact your healthcare provider if you think your medicine is not helping or if you have side effects. Tell him or her if you are allergic to any medicine. Keep a list of the medicines, vitamins, and herbs you take. Include the amounts, and when and why you take them. Bring the list or the pill bottles to follow-up visits. Carry your medicine list with you in case of an emergency.    Follow up with your healthcare provider within 72 hours, or as directed: You may need to return for more tests to find the cause of your chest pain. You may be referred to a specialist, such as a cardiologist or gastroenterologist. Write down your questions so you remember to ask them during your visits.     Healthy living tips: The following are general healthy guidelines. If your chest pain is caused by a heart problem, your healthcare provider will give you specific guidelines to follow.    Do not smoke. Nicotine and other chemicals in cigarettes and cigars can cause lung and heart damage. Ask your healthcare provider for information if you currently smoke and need help to quit. E-cigarettes or smokeless tobacco still contain nicotine. Talk to your healthcare provider before you use these products.       Eat a variety of healthy, low-fat, low-salt foods. Healthy foods include fruits, vegetables, whole-grain breads, low-fat dairy products, beans, lean meats, and fish. Ask for more information about a heart healthy diet.      Drink plenty of water every day. Your body is made of mostly water. Water helps your body to control your temperature and blood pressure. Ask your healthcare provider how much water you should drink every day.      Ask about activity. Your healthcare provider will tell you which activities to limit or avoid. Ask when you can drive, return to work, and have sex. Ask about the best exercise plan for you.      Maintain a healthy weight. Ask your healthcare provider how much you should weigh. Ask him or her to help you create a weight loss plan if you are overweight.       Get the flu and pneumonia vaccines. All adults should get the influenza (flu) vaccine. Get it every year as soon as it becomes available. The pneumococcal vaccine is given to adults aged 65 years or older. The vaccine is given every 5 years to prevent pneumococcal disease, such as pneumonia.    If you have a stent:     Carry your stent card with you at all times.       Let all healthcare providers know that you have a stent.

## 2021-09-23 NOTE — ED ADULT NURSE REASSESSMENT NOTE - NS ED NURSE REASSESS COMMENT FT1
Pt resting comfortably in bed.  UA and urine culture have been sent to lab, disposition pending results.  Comfort and safety maintained.  Call bell in reach.

## 2021-09-23 NOTE — ED PROVIDER NOTE - ATTENDING CONTRIBUTION TO CARE
I, Evelyn Lim DO,  performed the initial face to face bedside interview with this patient regarding history of present illness, review of symptoms and relevant past medical, social and family history.  I completed an independent physical examination.  I was the initial provider who evaluated this patient. I have signed out the follow up of any pending tests (i.e. labs, radiological studies) to the ACP.  I have communicated the patient’s plan of care and disposition with the ACP.  The history, relevant review of systems, past medical and surgical history, medical decision making, and physical examination was documented by the scribe in my presence and I attest to the accuracy of the documentation.

## 2021-09-23 NOTE — ED PROVIDER NOTE - PATIENT PORTAL LINK FT
You can access the FollowMyHealth Patient Portal offered by [facility] by registering at the following website: [email or web address]. By joining [facility]’s FollowMyHealth portal, you will also be able to view your health information using other applications (apps) compatible with our system.

## 2021-09-23 NOTE — ED PROVIDER NOTE - PROGRESS NOTE DETAILS
Dylan Lim:     HPI: 21 yof c.o. chest pain and SOB since last night. Pt. has a hx. of PE x1 year ago and is on Eliquis once a day. Pt. felt like her current episode was similar to prior PE.   Physical Exam: Normal.    MDM: work-up for CP; ECG, Chest X-Ray, labs, and re-evaluate. Results reviewed and discussed with pt. Discussed importance of close FU with PMD. Pt asked to return to ED immediately for any new or concerning sx or worsening. Pt acknowledges and understands plan -Carlos Baires PA-C Carina MAYORGA: patient with sob, chest pain since last night- constant; similar to hx of PE in 2020; on eliquis- compliant with dose; plan for ekg, cxr, labs- one troponin in setting of constant pain; re-eval

## 2021-09-23 NOTE — ED ADULT NURSE NOTE - CHIEF COMPLAINT QUOTE
pt presents to ed via ems from [facility] for evaluation of chest pain today, pt has hx of PE. ekg in progress, pt has no other complaints
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
## 2021-09-23 NOTE — ED PROVIDER NOTE - PHYSICAL EXAMINATION
Constitutional: NAD AAOx3  Eyes: PERRLA EOMI  Head: NCAT  Mouth: MMM  Cardiac: s1s2. rrr  Lungs: CTA B/L. No accessory muscle use  Abd: soft, nd. NTTP. no r/g/r  Neuro: CN2-12 intact  Ext: No LE swelling. Neg homans. 2+ distal pulses.   Skin: No rashes

## 2021-09-23 NOTE — ED ADULT NURSE NOTE - OBJECTIVE STATEMENT
Pt c/o chest pain and trouble breathing x3 months that progressively has gotten worse.  Pt being seen today due to worsening of pain.  Pt states no interventions relieve the pain.  Pain is worse on exertion.  Pt states they "felt like someone was sitting on me when I woke up".  PMH includes blood clot in heart, pre-diabetic, major depressive disorder, generalized anxiety disorder, autism spectrum disorder, and borderline personality disorder (as per pt).  Pt denies fever, productive cough, radiating pain, dizziness.  Will continue to monitor.

## 2021-09-23 NOTE — ED ADULT TRIAGE NOTE - CHIEF COMPLAINT QUOTE
pt presents to ed via ems from [facility] for evaluation of chest pain today, pt has hx of PE. ekg in progress, pt has no other complaints

## 2021-09-23 NOTE — ED PROVIDER NOTE - CLINICAL SUMMARY MEDICAL DECISION MAKING FREE TEXT BOX
21 yof presenting with CP and SOB since last night; Plan: CP work-up, ECG, Chest X-Ray, labs, and re-evaluate.

## 2021-09-24 ENCOUNTER — EMERGENCY (EMERGENCY)
Facility: HOSPITAL | Age: 21
LOS: 0 days | Discharge: HOME | End: 2021-09-24
Attending: STUDENT IN AN ORGANIZED HEALTH CARE EDUCATION/TRAINING PROGRAM
Payer: COMMERCIAL

## 2021-09-24 VITALS
RESPIRATION RATE: 16 BRPM | HEART RATE: 78 BPM | SYSTOLIC BLOOD PRESSURE: 125 MMHG | OXYGEN SATURATION: 98 % | DIASTOLIC BLOOD PRESSURE: 63 MMHG | HEIGHT: 65 IN | TEMPERATURE: 98 F | WEIGHT: 186.95 LBS

## 2021-09-24 DIAGNOSIS — F84.0 AUTISTIC DISORDER: ICD-10-CM

## 2021-09-24 DIAGNOSIS — R07.89 OTHER CHEST PAIN: ICD-10-CM

## 2021-09-24 DIAGNOSIS — F41.8 OTHER SPECIFIED ANXIETY DISORDERS: ICD-10-CM

## 2021-09-24 DIAGNOSIS — F60.3 BORDERLINE PERSONALITY DISORDER: ICD-10-CM

## 2021-09-24 PROCEDURE — 99284 EMERGENCY DEPT VISIT MOD MDM: CPT

## 2021-09-24 PROCEDURE — 93010 ELECTROCARDIOGRAM REPORT: CPT

## 2021-09-24 PROCEDURE — 93005 ELECTROCARDIOGRAM TRACING: CPT

## 2021-09-24 PROCEDURE — 99283 EMERGENCY DEPT VISIT LOW MDM: CPT

## 2021-09-24 RX ORDER — METHOCARBAMOL 500 MG/1
1500 TABLET, FILM COATED ORAL ONCE
Refills: 0 | Status: COMPLETED | OUTPATIENT
Start: 2021-09-24 | End: 2021-09-24

## 2021-09-24 RX ORDER — METHOCARBAMOL 500 MG/1
1500 TABLET, FILM COATED ORAL
Qty: 24 | Refills: 0
Start: 2021-09-24 | End: 2021-09-26

## 2021-09-24 RX ORDER — IBUPROFEN 200 MG
1 TABLET ORAL
Qty: 20 | Refills: 0
Start: 2021-09-24 | End: 2021-09-28

## 2021-09-24 RX ORDER — IBUPROFEN 200 MG
600 TABLET ORAL ONCE
Refills: 0 | Status: COMPLETED | OUTPATIENT
Start: 2021-09-24 | End: 2021-09-24

## 2021-09-24 NOTE — ED PROVIDER NOTE - PHYSICAL EXAMINATION
Constitutional: NAD. sitting comfortbaly in stretcher watching tv, AAOx3  Eyes: PERRLA EOMI  Head: NCAT  Mouth: MMM  Chest: Evelyn ttp L anterior chest wall  Back: No midline tenderness. TTP hypertonic L trapezius muscle.   Cardiac: s1s2. rrr  Lungs: CTA B/L. No accessory muscle use  Abd: soft, nd. NTTP. no r/g/r  Ext: No LE swelling. Neg homans. 2+ distal pulses   Neuro: CN2-12 intact  Skin: No rashes

## 2021-09-24 NOTE — ED ADULT NURSE NOTE - CHPI ED NUR SYMPTOMS POS
----- Message from Ralph Betts sent at 3/23/2018 11:01 AM CDT -----  Regarding: Lab Client Services  Contact: [phone]  Hi,           my name is Ralph I work in the lab. We received a specimen for Clostridium Difficile test. The test was unable to be performed due to the stool sample was formed stool. If you have any questions regarding this please contact client services at [phone]. Thank You    CHEST PAIN

## 2021-09-24 NOTE — ED PROVIDER NOTE - PROGRESS NOTE DETAILS
Carina MAYORGA: Patient is a 20 yo female with 2 days of substernal chest pain- constant; worse with movement and change in position; seen in ED yesterday; ekg, cxr, labs including d-dimer wnl; patient was discharged home; patient is on eliquis for hx of PE and has been compliant with medication and has not missed any doses; patient did not take any medication for pain; patient is due to see pmd on monday but returns to ED for evaluation; patient also reports that he is here as eating disorder is "acting up"- reports that he has not had anything to eat bedsides a sandwich today for last month and wants to get checked out for his eating disorder too. Carina MAYORGA: I spoke to - Aki regarding patient; patient has been eating and drinking everyday- no concern for eating disorder at this time; Aki has already arranged for patient to see her PMD on Monday and will arrange for outpatient f/u with cardiologist at [facility]; patient asked to be transferred to [facility] at this time and aware that as she has no accepting physician- Aki can take patient to outside hospital if she wishes; ekg unchanged from yesterday; work up negative yesterday and presentation of symptoms unchanged from yesterday; per staff- patient "clamors for attention" and is comfortable with discharge home for patient at this time; staff to pick patient up and will ensure timely f/u; strict return precautions given.

## 2021-09-24 NOTE — ED PROVIDER NOTE - CLINICAL SUMMARY MEDICAL DECISION MAKING FREE TEXT BOX
21 y.o F w/ chest and upper back pain that is reproducible, pain consistent with msk pain. Do not suspect acs, ptx given clear lung fields, PERC negative. Patient understands to take ibuprofen/robaxin as needed for pain.

## 2021-09-24 NOTE — ED PROVIDER NOTE - OBJECTIVE STATEMENT
20 y/o M presents with CP x 2 days. Pt seen in ED for chest pain with unremarkable workup. Pt reports  he is still having CP now also having pain to L upper back mild-moderate in intensity worse with movement. Patient has not taken any pain medication at home. Denies fever/chills, n/v/d, SOB, HA, dizziness, leg swelling or other complaints at this time. -Carlos Baires PA-C

## 2021-09-24 NOTE — ED PROVIDER NOTE - ATTENDING CONTRIBUTION TO CARE
I, Evelyn Lim DO,  performed the initial face to face bedside interview with this patient regarding history of present illness, review of symptoms and relevant past medical, social and family history.  I completed an independent physical examination.  I was the initial provider who evaluated this patient. I have signed out the follow up of any pending tests (i.e. labs, radiological studies) to the ACP.  I have communicated the patient’s plan of care and disposition with the ACP.

## 2021-09-24 NOTE — ED PROVIDER NOTE - PATIENT PORTAL LINK FT
You can access the FollowMyHealth Patient Portal offered by [facility] by registering at the following website: [email or web address]. By joining [facility]’s FollowMyHealth portal, you will also be able to view your health information using other applications (apps) compatible with our system.

## 2021-09-24 NOTE — ED ADULT NURSE REASSESSMENT NOTE - NS ED NURSE REASSESS COMMENT FT1
pt spoke with Dr. Lim regarding plan of discharge. Pt refuse to be discharged and request Dr. Lim to speak with pt's cardiologist. Pt spoke with charge RN and request to be transferred to [place]. Explained pt's cardiologist needs to accept pt to be transferred. Pt refuse to speak with cardiologist on the phone and states "I have anxiety issue when I speak on the phone." pt spoke with  on the phone and states "I want to be brought to [place]." Pt escorted out to be picked up by staff member from group home.

## 2021-09-24 NOTE — ED PROVIDER NOTE - NSFOLLOWUPINSTRUCTIONS_ED_ALL_ED_FT
Please follow up with your Primary MD in 1-2 days. May take Ibuprofen every 6 hours as needed for pain. Return to ED immediately for any new or concerning symptoms or worsening symptoms    Chest Pain    WHAT YOU NEED TO KNOW:    Chest pain can be caused by a range of conditions, from not serious to life-threatening. Chest pain can be a symptom of a digestive problem, such as acid reflux or a stomach ulcer. An anxiety attack or a strong emotion, such as anger, can also cause chest pain. Infection, inflammation, or a fracture in the bones or cartilage in your chest can cause pain or discomfort. Sometimes chest pain or pressure is caused by poor blood flow to your heart (angina). Chest pain may also be caused by life-threatening conditions such as a heart attack or blood clot in your lungs.     DISCHARGE INSTRUCTIONS:    Call 911 if:     You have any of the following signs of a heart attack:   Squeezing, pressure, or pain in your chest       and any of the following:   Discomfort or pain in your back, neck, jaw, stomach, or arm       Shortness of breath      Nausea or vomiting      Lightheadedness or a sudden cold sweat        Return to the emergency department if:     You have chest discomfort that gets worse, even with medicine.      You cough or vomit blood.       Your bowel movements are black or bloody.       You cannot stop vomiting, or it hurts to swallow.     Contact your healthcare provider if:     You have questions or concerns about your condition or care.        Medicines:     Medicines may be given to treat the cause of your chest pain. Examples include pain medicine, anxiety medicine, or medicines to increase blood flow to your heart.       Do not take certain medicines without asking your healthcare provider first. These include NSAIDs, herbal or vitamin supplements, or hormones (estrogen or progestin).       Take your medicine as directed. Contact your healthcare provider if you think your medicine is not helping or if you have side effects. Tell him or her if you are allergic to any medicine. Keep a list of the medicines, vitamins, and herbs you take. Include the amounts, and when and why you take them. Bring the list or the pill bottles to follow-up visits. Carry your medicine list with you in case of an emergency.    Follow up with your healthcare provider within 72 hours, or as directed: You may need to return for more tests to find the cause of your chest pain. You may be referred to a specialist, such as a cardiologist or gastroenterologist. Write down your questions so you remember to ask them during your visits.     Healthy living tips: The following are general healthy guidelines. If your chest pain is caused by a heart problem, your healthcare provider will give you specific guidelines to follow.    Do not smoke. Nicotine and other chemicals in cigarettes and cigars can cause lung and heart damage. Ask your healthcare provider for information if you currently smoke and need help to quit. E-cigarettes or smokeless tobacco still contain nicotine. Talk to your healthcare provider before you use these products.       Eat a variety of healthy, low-fat, low-salt foods. Healthy foods include fruits, vegetables, whole-grain breads, low-fat dairy products, beans, lean meats, and fish. Ask for more information about a heart healthy diet.      Drink plenty of water every day. Your body is made of mostly water. Water helps your body to control your temperature and blood pressure. Ask your healthcare provider how much water you should drink every day.      Ask about activity. Your healthcare provider will tell you which activities to limit or avoid. Ask when you can drive, return to work, and have sex. Ask about the best exercise plan for you.      Maintain a healthy weight. Ask your healthcare provider how much you should weigh. Ask him or her to help you create a weight loss plan if you are overweight.       Get the flu and pneumonia vaccines. All adults should get the influenza (flu) vaccine. Get it every year as soon as it becomes available. The pneumococcal vaccine is given to adults aged 65 years or older. The vaccine is given every 5 years to prevent pneumococcal disease, such as pneumonia.    If you have a stent:     Carry your stent card with you at all times.       Let all healthcare providers know that you have a stent.

## 2021-09-25 LAB
CULTURE RESULTS: SIGNIFICANT CHANGE UP
SPECIMEN SOURCE: SIGNIFICANT CHANGE UP

## 2021-11-02 ENCOUNTER — INPATIENT (INPATIENT)
Facility: HOSPITAL | Age: 21
LOS: 8 days | Discharge: HOME | DRG: 881 | End: 2021-11-11
Attending: PSYCHIATRY & NEUROLOGY | Admitting: PSYCHIATRY & NEUROLOGY
Payer: COMMERCIAL

## 2021-11-02 VITALS
SYSTOLIC BLOOD PRESSURE: 121 MMHG | RESPIRATION RATE: 18 BRPM | HEIGHT: 65 IN | DIASTOLIC BLOOD PRESSURE: 71 MMHG | TEMPERATURE: 98 F | OXYGEN SATURATION: 100 % | WEIGHT: 179.9 LBS | HEART RATE: 82 BPM

## 2021-11-02 DIAGNOSIS — F60.3 BORDERLINE PERSONALITY DISORDER: ICD-10-CM

## 2021-11-02 DIAGNOSIS — F32.A DEPRESSION, UNSPECIFIED: ICD-10-CM

## 2021-11-02 DIAGNOSIS — Z98.890 OTHER SPECIFIED POSTPROCEDURAL STATES: Chronic | ICD-10-CM

## 2021-11-02 LAB
ALBUMIN SERPL ELPH-MCNC: 3.2 G/DL — LOW (ref 3.3–5)
ALP SERPL-CCNC: 90 U/L — SIGNIFICANT CHANGE UP (ref 40–120)
ALT FLD-CCNC: 21 U/L — SIGNIFICANT CHANGE UP (ref 12–78)
ANION GAP SERPL CALC-SCNC: 6 MMOL/L — SIGNIFICANT CHANGE UP (ref 5–17)
APAP SERPL-MCNC: < 2 UG/ML (ref 10–30)
AST SERPL-CCNC: 15 U/L — SIGNIFICANT CHANGE UP (ref 15–37)
BASOPHILS # BLD AUTO: 0.08 K/UL — SIGNIFICANT CHANGE UP (ref 0–0.2)
BASOPHILS NFR BLD AUTO: 0.6 % — SIGNIFICANT CHANGE UP (ref 0–2)
BILIRUB SERPL-MCNC: 0.1 MG/DL — LOW (ref 0.2–1.2)
BUN SERPL-MCNC: 12 MG/DL — SIGNIFICANT CHANGE UP (ref 7–23)
CALCIUM SERPL-MCNC: 8.7 MG/DL — SIGNIFICANT CHANGE UP (ref 8.5–10.1)
CHLORIDE SERPL-SCNC: 110 MMOL/L — HIGH (ref 96–108)
CO2 SERPL-SCNC: 22 MMOL/L — SIGNIFICANT CHANGE UP (ref 22–31)
CREAT SERPL-MCNC: 1.01 MG/DL — SIGNIFICANT CHANGE UP (ref 0.5–1.3)
EOSINOPHIL # BLD AUTO: 0.43 K/UL — SIGNIFICANT CHANGE UP (ref 0–0.5)
EOSINOPHIL NFR BLD AUTO: 3.4 % — SIGNIFICANT CHANGE UP (ref 0–6)
ETHANOL SERPL-MCNC: <10 MG/DL — SIGNIFICANT CHANGE UP (ref 0–10)
GLUCOSE SERPL-MCNC: 116 MG/DL — HIGH (ref 70–99)
HCT VFR BLD CALC: 37.5 % — SIGNIFICANT CHANGE UP (ref 34.5–45)
HGB BLD-MCNC: 11.4 G/DL — LOW (ref 11.5–15.5)
IMM GRANULOCYTES NFR BLD AUTO: 0.2 % — SIGNIFICANT CHANGE UP (ref 0–1.5)
LYMPHOCYTES # BLD AUTO: 23.9 % — SIGNIFICANT CHANGE UP (ref 13–44)
LYMPHOCYTES # BLD AUTO: 3 K/UL — SIGNIFICANT CHANGE UP (ref 1–3.3)
MCHC RBC-ENTMCNC: 24.5 PG — LOW (ref 27–34)
MCHC RBC-ENTMCNC: 30.4 GM/DL — LOW (ref 32–36)
MCV RBC AUTO: 80.5 FL — SIGNIFICANT CHANGE UP (ref 80–100)
MONOCYTES # BLD AUTO: 0.46 K/UL — SIGNIFICANT CHANGE UP (ref 0–0.9)
MONOCYTES NFR BLD AUTO: 3.7 % — SIGNIFICANT CHANGE UP (ref 2–14)
NEUTROPHILS # BLD AUTO: 8.53 K/UL — HIGH (ref 1.8–7.4)
NEUTROPHILS NFR BLD AUTO: 68.2 % — SIGNIFICANT CHANGE UP (ref 43–77)
PCP SPEC-MCNC: SIGNIFICANT CHANGE UP
PLATELET # BLD AUTO: 345 K/UL — SIGNIFICANT CHANGE UP (ref 150–400)
POTASSIUM SERPL-MCNC: 3.8 MMOL/L — SIGNIFICANT CHANGE UP (ref 3.5–5.3)
POTASSIUM SERPL-SCNC: 3.8 MMOL/L — SIGNIFICANT CHANGE UP (ref 3.5–5.3)
PROT SERPL-MCNC: 6.6 GM/DL — SIGNIFICANT CHANGE UP (ref 6–8.3)
RBC # BLD: 4.66 M/UL — SIGNIFICANT CHANGE UP (ref 3.8–5.2)
RBC # FLD: 14 % — SIGNIFICANT CHANGE UP (ref 10.3–14.5)
SALICYLATES SERPL-MCNC: <1.7 MG/DL (ref 2.8–20)
SODIUM SERPL-SCNC: 138 MMOL/L — SIGNIFICANT CHANGE UP (ref 135–145)
WBC # BLD: 12.53 K/UL — HIGH (ref 3.8–10.5)
WBC # FLD AUTO: 12.53 K/UL — HIGH (ref 3.8–10.5)

## 2021-11-02 PROCEDURE — 99285 EMERGENCY DEPT VISIT HI MDM: CPT

## 2021-11-02 NOTE — ED BEHAVIORAL HEALTH ASSESSMENT NOTE - PATIENT'S CHIEF COMPLAINT
I have been depressed and having suicidal thoughts of drowning myself "suicidal ideation, chest pain, hurts to breathe"

## 2021-11-02 NOTE — ED PROVIDER NOTE - PROGRESS NOTE DETAILS
spoke to TelePsych Dr. Mc she will dari young -Radha Flower PA-C Spoke to Dr. Trevizo. Pt to be admitted voluntary. Is requesting [place] or [facility]. Needs to be held until bed available. Will need private room. Requesting lithium level and for pt to get night meds. Yolis REID

## 2021-11-02 NOTE — ED ADULT TRIAGE NOTE - CHIEF COMPLAINT QUOTE
pt presents to ed via ems and [facility] from [facility] for evaluation of suicidal ideations. per ems, recent family death has triggered patient and she was endorsing suicidal ideations and has left arm lacerations from scissor. pt demies HI. pt reluctacntly answering, not maintaing eye contact. Pt states she has previous psychiatric admission. 1-1 in place for safety
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
## 2021-11-02 NOTE — ED BEHAVIORAL HEALTH ASSESSMENT NOTE - PSYCHIATRIC ISSUES AND PLAN (INCLUDE STANDING AND PRN MEDICATION)
PRNS: haldol 5mg, ativan 2mg, diphenhydramine 50mg, PO/IM, Q6H for Agitation ; continue current regimen of lexapro 15mg daily, lithium 600mg qhs; obtain lithium level
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
## 2021-11-02 NOTE — ED ADULT NURSE REASSESSMENT NOTE - NS ED NURSE REASSESS COMMENT FT1
Received report from MR RN. Pt. resting comfortably. 1:1 bedside. Safety maintained. Awaiting telepsych

## 2021-11-02 NOTE — ED BEHAVIORAL HEALTH ASSESSMENT NOTE - OTHER
x group home laughing, smiling pending [facility] or [facility] bed availability

## 2021-11-02 NOTE — ED BEHAVIORAL HEALTH NOTE - BEHAVIORAL HEALTH NOTE
===================  PRE-HOSPITAL COURSE  ===================  SOURCE:  RN/ED documentation   DETAILS:  Pt. BIB EMS/PD, from group home c/o SI and recent SIB       ============  ED COURSE   ============  SOURCE:  RN/ED documentation   ARRIVAL:  EMS/PD, accompanied by staff member  BELONGINGS:  no belongings of note   BEHAVIOR: Pt. arrived to ED alert and oriented, appearing in good hygiene.  Pt. calm, cooperative/compliant with triage processes, but somewhat withdrawn/reluctant to provide information.  Pt. eye contact described as poor, speech clear/coherent, thoughts logical and linear in nature.  Pt. endorses worsening depression with recent loss of grandmother, and issues with peers and staff at the home.  They endorse SI w/o plan, and admit to SIB by cutting last night.  IN ED pt. mood reportedly depressed, affect incongruent, at times smiling and laughing despite endorsements of feeling down and emotional.   TREATMENT:  no medications given, no security intervention required   VISITORS:  staff member present         ========================  COLLATERAL  ========================  NAME: Aki Addison   NUMBER: [phone]  RELATIONSHIP:   RELIABILITY: reliable   COMMENTS: Aki reports that all he can share about pt. at this time is that they did recently experience the passing of grandmother with whom they were close with.  Collateral indicates that pt. has seemed more isolative and depressed x 3-4 days.  Aki shares that last night pt. had reportedly cut themselves with scissors they had somehow hidden in their room and showed these abrasions to him yesterday morning.  This afternoon pt. had a biannual "life planning" meeting with their care team and became seemingly unreasonably upset about an issue accessing the internet.  Pt. got up and stormed out of the room and left the facility, was not able to be deescalated and eventually while staff were following in the woods by the home pt. made statement about wanting to walk into traffic and end things. Staff activated 911 as per protocol.  Pt. is welcome to return back if cleared, but if pt. is seeking admission, this would also be supported.              COVID Exposure Screen- collateral (i.e. third-party, chart review, belongings, etc; include EMS and ED staff)  1.            *Has the patient had a COVID-19 test in the last 90 days?  (  ) Yes   (  ) No   ( X  ) Unknown- Reason: No collateral for screener questions_____  IF YES PROCEED TO QUESTION #2. IF NO OR UNKNOWN, PLEASE SKIP TO QUESTION #3.  2.            Date of test(s) and result(s):    3.            *Has the patient tested positive for COVID-19 antibodies? (  ) Yes   (   ) No   ( X ) Unknown- Reason: _____  IF YES PROCEED TO QUESTION #4. IF NO or UNKNOWN, PLEASE SKIP TO QUESTION #5.  4.            Date of positive antibody test:   5.            *Has the patient received 2 doses of the COVID-19 vaccine? (   ) Yes   (   ) No   (X  ) Unknown- Reason: _____  IF YES PROCEED TO QUESTION #6. IF NO or UNKNOWN, PLEASE SKIP TO QUESTION #7.  6.             Date of second dose:    7.            *In the past 10 days, has the patient been around anyone with a positive COVID-19 test?* (  ) Yes   (   ) No   ( X  ) Unknown- Reason: __  IF YES PROCEED TO QUESTION #8. IF NO or UNKNOWN, PLEASE SKIP TO QUESTION #13.  8.            Was the patient within 6 feet of them for at least 15 minutes? (  ) Yes   (  ) No   (  ) Unknown- Reason: _____  9.            Did the patient provide care for them? (  ) Yes   (  ) No   (  ) Unknown- Reason: ______  10.          Did the patient have direct physical contact with them (touched, hugged, or kissed them)? (  ) Yes   (  ) No    (X  ) Unknown- Reason: __  11.          Did the patient share eating or drinking utensils with them? (  ) Yes   (  ) No    (  ) Unknown- Reason: ____  12.          Did they sneeze, cough, or somehow get respiratory droplets on the patient? (  ) Yes   (  ) No    (  ) Unknown- Reason: ______  13.          *Has the patient been out of [place] within the past 10 days?* (  ) Yes   (  ) No   (  X) Unknown- Reason: _____  IF YES PLEASE ANSWER THE FOLLOWING QUESTIONS:  14.          Which state/country did they go to?  15.          Were they there over 24 hours? (  ) Yes   (  ) No    (  ) Unknown- Reason: ______  16.          Date of return to [facility]:

## 2021-11-02 NOTE — ED BEHAVIORAL HEALTH ASSESSMENT NOTE - CURRENT MEDICATION
Prozac 20 mg qd, Latuda 60 mg qhs, birth control eliquis 5mg BID, lexapro 15mg daily, concerta 36mg daily, metformin 1000mg BID, lithium 600mg qhs

## 2021-11-02 NOTE — ED BEHAVIORAL HEALTH ASSESSMENT NOTE - RISK ASSESSMENT
Risk factors: baseline mood disorder, previous suicide attempts, previous inpatient hospitalizations, autism spec disorder.  Acute risks: depressive episode, suicidal thoughts with plan and intent.  Protective factors: no known access to weapons, no history of violence, no substance use, no panic.  Imminent suicide risk is MODERATE at this time, plan to mitigate with inpatient level of care for safety and stabilization. Low Acute Suicide Risk

## 2021-11-02 NOTE — ED PROVIDER NOTE - NSICDXPASTMEDICALHX_GEN_ALL_CORE_FT
PAST MEDICAL HISTORY:  Autism     Borderline personality disorder     Depression     Diabetes     JEREMY (generalized anxiety disorder)     H/O blood clots

## 2021-11-02 NOTE — ED PROVIDER NOTE - PSYCHIATRIC, MLM
Alert and oriented to person, place, time/situation. normal mood and affect. no apparent risk to self or others. Alert and oriented to person, place, time/situation. sad  mood and affect. no apparent risk to self or others.

## 2021-11-02 NOTE — ED PROVIDER NOTE - MUSCULOSKELETAL, MLM
Spine appears normal, range of motion is not limited, no muscle or joint tenderness. Extremities no edema.
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
## 2021-11-02 NOTE — ED ADULT NURSE NOTE - OBJECTIVE STATEMENT
pt BIBEMS and [facility] from [facility] group home for eval of suicidal ideation/self harm. pt endorses SI, denies HI. denies AH/VH. + superficial lacerations to BL UE states she cut herself last night w/ scissor. unknown date of last tetanus. a&ox3. calm and cooperative. 1:1 initiated and maintained for safety. pt reports pmhx of restrictive eating disorder.

## 2021-11-02 NOTE — ED ADULT NURSE NOTE - CHIEF COMPLAINT QUOTE
pt presents to ed via ems and [facility] from [facility] for evaluation of suicidal ideations. per ems, recent family death has triggered patient and she was endorsing suicidal ideations and has left arm lacerations from scissor. pt demies HI. pt reluctacntly answering, not maintaing eye contact. Pt states she has previous psychiatric admission. 1-1 in place for safety

## 2021-11-03 DIAGNOSIS — F32.9 MAJOR DEPRESSIVE DISORDER, SINGLE EPISODE, UNSPECIFIED: ICD-10-CM

## 2021-11-03 LAB — LITHIUM SERPL-MCNC: 0.2 MMOL/L — LOW (ref 0.6–1.2)

## 2021-11-03 PROCEDURE — 80178 ASSAY OF LITHIUM: CPT

## 2021-11-03 PROCEDURE — 83036 HEMOGLOBIN GLYCOSYLATED A1C: CPT

## 2021-11-03 PROCEDURE — 84443 ASSAY THYROID STIM HORMONE: CPT

## 2021-11-03 PROCEDURE — 36415 COLL VENOUS BLD VENIPUNCTURE: CPT

## 2021-11-03 PROCEDURE — 80061 LIPID PANEL: CPT

## 2021-11-03 RX ORDER — LITHIUM CARBONATE 300 MG/1
600 TABLET, EXTENDED RELEASE ORAL AT BEDTIME
Refills: 0 | Status: DISCONTINUED | OUTPATIENT
Start: 2021-11-03 | End: 2021-11-04

## 2021-11-03 RX ORDER — RISPERIDONE 4 MG/1
0.5 TABLET ORAL THREE TIMES A DAY
Refills: 0 | Status: DISCONTINUED | OUTPATIENT
Start: 2021-11-03 | End: 2021-11-05

## 2021-11-03 RX ORDER — METHYLPHENIDATE HCL 5 MG
10 TABLET ORAL
Refills: 0 | Status: COMPLETED | OUTPATIENT
Start: 2021-11-03 | End: 2021-11-10

## 2021-11-03 RX ORDER — LITHIUM CARBONATE 300 MG/1
600 TABLET, EXTENDED RELEASE ORAL ONCE
Refills: 0 | Status: COMPLETED | OUTPATIENT
Start: 2021-11-03 | End: 2021-11-03

## 2021-11-03 RX ORDER — HALOPERIDOL DECANOATE 100 MG/ML
5 INJECTION INTRAMUSCULAR ONCE
Refills: 0 | Status: DISCONTINUED | OUTPATIENT
Start: 2021-11-03 | End: 2021-11-10

## 2021-11-03 RX ORDER — INFLUENZA VIRUS VACCINE 15; 15; 15; 15 UG/.5ML; UG/.5ML; UG/.5ML; UG/.5ML
0.5 SUSPENSION INTRAMUSCULAR ONCE
Refills: 0 | Status: COMPLETED | OUTPATIENT
Start: 2021-11-03 | End: 2021-11-03

## 2021-11-03 RX ORDER — METHYLPHENIDATE HCL 5 MG
36 TABLET ORAL ONCE
Refills: 0 | Status: DISCONTINUED | OUTPATIENT
Start: 2021-11-03 | End: 2021-11-03

## 2021-11-03 RX ORDER — APIXABAN 2.5 MG/1
5 TABLET, FILM COATED ORAL ONCE
Refills: 0 | Status: COMPLETED | OUTPATIENT
Start: 2021-11-03 | End: 2021-11-03

## 2021-11-03 RX ORDER — METFORMIN HYDROCHLORIDE 850 MG/1
1000 TABLET ORAL ONCE
Refills: 0 | Status: COMPLETED | OUTPATIENT
Start: 2021-11-03 | End: 2021-11-03

## 2021-11-03 RX ORDER — ESCITALOPRAM OXALATE 10 MG/1
15 TABLET, FILM COATED ORAL DAILY
Refills: 0 | Status: DISCONTINUED | OUTPATIENT
Start: 2021-11-03 | End: 2021-11-11

## 2021-11-03 RX ORDER — DIPHENHYDRAMINE HCL 50 MG
50 CAPSULE ORAL ONCE
Refills: 0 | Status: COMPLETED | OUTPATIENT
Start: 2021-11-03 | End: 2021-11-03

## 2021-11-03 RX ORDER — DIPHENHYDRAMINE HCL 50 MG
50 CAPSULE ORAL ONCE
Refills: 0 | Status: DISCONTINUED | OUTPATIENT
Start: 2021-11-03 | End: 2021-11-10

## 2021-11-03 RX ORDER — HALOPERIDOL DECANOATE 100 MG/ML
5 INJECTION INTRAMUSCULAR ONCE
Refills: 0 | Status: COMPLETED | OUTPATIENT
Start: 2021-11-03 | End: 2021-11-03

## 2021-11-03 RX ADMIN — LITHIUM CARBONATE 600 MILLIGRAM(S): 300 TABLET, EXTENDED RELEASE ORAL at 02:39

## 2021-11-03 RX ADMIN — Medication 1 MILLIGRAM(S): at 11:28

## 2021-11-03 RX ADMIN — METFORMIN HYDROCHLORIDE 1000 MILLIGRAM(S): 850 TABLET ORAL at 02:39

## 2021-11-03 RX ADMIN — HALOPERIDOL DECANOATE 5 MILLIGRAM(S): 100 INJECTION INTRAMUSCULAR at 16:57

## 2021-11-03 RX ADMIN — Medication 2 MILLIGRAM(S): at 16:57

## 2021-11-03 RX ADMIN — ESCITALOPRAM OXALATE 15 MILLIGRAM(S): 10 TABLET, FILM COATED ORAL at 11:28

## 2021-11-03 RX ADMIN — APIXABAN 5 MILLIGRAM(S): 2.5 TABLET, FILM COATED ORAL at 02:39

## 2021-11-03 RX ADMIN — LITHIUM CARBONATE 600 MILLIGRAM(S): 300 TABLET, EXTENDED RELEASE ORAL at 21:26

## 2021-11-03 RX ADMIN — APIXABAN 5 MILLIGRAM(S): 2.5 TABLET, FILM COATED ORAL at 11:28

## 2021-11-03 RX ADMIN — Medication 50 MILLIGRAM(S): at 16:57

## 2021-11-03 NOTE — CHART NOTE - NSCHARTNOTEFT_GEN_A_CORE
Pt demanding and yelling  to be allowed to leave. Pt started to repeatedly punch the plexiglas in the recreation room   and was not able to regain behavioral control and is at risk for self injury.  Pt given haldol 5mg IM, ativan 2mg , benadryl 50mg IM . Pt demanding and yelling  to be allowed to leave. Pt started to repeatedly punch the plexiglas in the recreation room   and was not able to regain behavioral control and is at risk for self injury.  Pt given haldol 5mg IM, ativan 2mg , benadryl 50mg IM . Pt was unable to be maintained in the quiet room as the pt  attempted to self injure herself and was digging her nails into her arms and hands and scratching herself .Pt placed into four point restraints to prevent further self injury.

## 2021-11-03 NOTE — PROGRESS NOTE BEHAVIORAL HEALTH - NSBHFUPINTERVALHXFT_PSY_A_CORE
Patient is alert and oriented to person, time, place and situation. Patient is irritable, labile, angry, hostile, demanding transfer to [facility] or [facility], stating not wanting admission at [facility]. Patient remains a suicidal behaviors in ED, putting gown around neck, perseverative on transfer to [facility]. Hence. manipulative. Patient telling writer to get the f^ck out the room.

## 2021-11-03 NOTE — PATIENT PROFILE BEHAVIORAL HEALTH - NSBHBEHAVIOR_PSY_A_CORE
aggressive/angry/combative/crying/cursing/exit seeking/irrational/restless/screams/threatening/uncooperative

## 2021-11-03 NOTE — ED BEHAVIORAL HEALTH NOTE - BEHAVIORAL HEALTH NOTE
Patient asking to be transferred.  Due to patient being transgender, he needs a private room.  [place] refused based on no private room and presentation.  [facility] and [place] - no private rooms.  NP Karon aware.

## 2021-11-03 NOTE — PROGRESS NOTE BEHAVIORAL HEALTH - RISK ASSESSMENT
HIGH RISK     ACUTE RISK FACTORS: depressed mood, irritable, labile, mood dysregulation, hopelessness, impulsivity, suicidal ideation, suicidal behaviors, self harm     CHRONIC RISK FACTORS: Mood disorder, Depression, Anxiety, ?Bipolar, Borderline personality disorder, history of suicide attempt, history of in-patient hospitalization    PROTECTIVE FACTORS: help-seeking

## 2021-11-03 NOTE — PATIENT PROFILE BEHAVIORAL HEALTH - NSBHADMAGGR_PSY_A_CORE
HPI: Patient is here today for follow up of ED finding of 5.3cm right ovarian hemorrhagic cyst. Pt presented last night for elevated blood sugar. Was having some pelvic cramping with the onset of vag bleeding 2 days prior. Has Nexplanon and hadn't had vaginal bleeding for past year. Feeling much better today. Has had a new partner since last genprobe was done. UCg neg in ED. ROS:  Pertinent ROS as above in HPI      Physical Exam :     Vitals:    12/11/17 1323   BP: 124/68   Pulse: 99   Resp: 16   Temp: 98.9 Â°F (37.2 Â°C)                   Assessment/Plan:      5.3 cm right ovarian cyst, consistent with hemorrhagic cyst - patient with minimal cramping which may be due to onset of menses. Recommended repeat ultrasound in 6-12 weeks. genprobe screening sent. 15 minutes of time were spent with Ms. Homa Melo during this visit. Greater than 50% of the time was spent in face-to-face counseling, education, and coordination of care.
yes

## 2021-11-03 NOTE — ED ADULT NURSE REASSESSMENT NOTE - NS ED NURSE REASSESS COMMENT FT1
recvd pt from night RN resting in bed with 1:1 at bedside for safety. Pt escorted to restroom, bed sheets changed and breakfast ordered. Pending admission placement.

## 2021-11-03 NOTE — PATIENT PROFILE BEHAVIORAL HEALTH - NSBHHOMTHTS_PSY_A_CORE
patient used her feet to bank heater in gym room, banged her head against wall in the quiet room, scratcher herself/present

## 2021-11-04 PROCEDURE — 99231 SBSQ HOSP IP/OBS SF/LOW 25: CPT

## 2021-11-04 RX ORDER — HALOPERIDOL DECANOATE 100 MG/ML
5 INJECTION INTRAMUSCULAR ONCE
Refills: 0 | Status: COMPLETED | OUTPATIENT
Start: 2021-11-04 | End: 2021-11-04

## 2021-11-04 RX ORDER — DIPHENHYDRAMINE HCL 50 MG
50 CAPSULE ORAL ONCE
Refills: 0 | Status: DISCONTINUED | OUTPATIENT
Start: 2021-11-04 | End: 2021-11-10

## 2021-11-04 RX ORDER — LITHIUM CARBONATE 300 MG/1
900 TABLET, EXTENDED RELEASE ORAL AT BEDTIME
Refills: 0 | Status: DISCONTINUED | OUTPATIENT
Start: 2021-11-04 | End: 2021-11-11

## 2021-11-04 RX ORDER — DIPHENHYDRAMINE HCL 50 MG
50 CAPSULE ORAL ONCE
Refills: 0 | Status: COMPLETED | OUTPATIENT
Start: 2021-11-04 | End: 2021-11-04

## 2021-11-04 RX ADMIN — Medication 2 MILLIGRAM(S): at 13:22

## 2021-11-04 RX ADMIN — HALOPERIDOL DECANOATE 5 MILLIGRAM(S): 100 INJECTION INTRAMUSCULAR at 13:21

## 2021-11-04 RX ADMIN — Medication 50 MILLIGRAM(S): at 13:21

## 2021-11-04 NOTE — PROGRESS NOTE BEHAVIORAL HEALTH - NSBHFUPINTERVALHXFT_PSY_A_CORE
Patient is alert and oriented to person, time, place and situation. Patient is irritable, labile, angry, hostile, demanding transfer to [facility] or [facility], stating not wanting admission at [facility]. Patient remains a suicidal behaviors in ED, putting gown around neck, perseverative on transfer to [facility]. Hence. manipulative. Patient telling writer to get the f^ck out the room.

## 2021-11-05 PROCEDURE — 99232 SBSQ HOSP IP/OBS MODERATE 35: CPT

## 2021-11-05 PROCEDURE — 99252 IP/OBS CONSLTJ NEW/EST SF 35: CPT

## 2021-11-05 PROCEDURE — 99222 1ST HOSP IP/OBS MODERATE 55: CPT

## 2021-11-05 RX ORDER — SODIUM CHLORIDE 9 MG/ML
1000 INJECTION, SOLUTION INTRAVENOUS
Refills: 0 | Status: DISCONTINUED | OUTPATIENT
Start: 2021-11-05 | End: 2021-11-10

## 2021-11-05 RX ORDER — DEXTROSE 50 % IN WATER 50 %
25 SYRINGE (ML) INTRAVENOUS ONCE
Refills: 0 | Status: DISCONTINUED | OUTPATIENT
Start: 2021-11-05 | End: 2021-11-10

## 2021-11-05 RX ORDER — INSULIN LISPRO 100/ML
VIAL (ML) SUBCUTANEOUS AT BEDTIME
Refills: 0 | Status: DISCONTINUED | OUTPATIENT
Start: 2021-11-05 | End: 2021-11-10

## 2021-11-05 RX ORDER — DEXTROSE 50 % IN WATER 50 %
12.5 SYRINGE (ML) INTRAVENOUS ONCE
Refills: 0 | Status: DISCONTINUED | OUTPATIENT
Start: 2021-11-05 | End: 2021-11-10

## 2021-11-05 RX ORDER — DEXTROSE 50 % IN WATER 50 %
15 SYRINGE (ML) INTRAVENOUS ONCE
Refills: 0 | Status: DISCONTINUED | OUTPATIENT
Start: 2021-11-05 | End: 2021-11-10

## 2021-11-05 RX ORDER — METFORMIN HYDROCHLORIDE 850 MG/1
1000 TABLET ORAL
Refills: 0 | Status: DISCONTINUED | OUTPATIENT
Start: 2021-11-05 | End: 2021-11-11

## 2021-11-05 RX ORDER — APIXABAN 2.5 MG/1
5 TABLET, FILM COATED ORAL
Refills: 0 | Status: DISCONTINUED | OUTPATIENT
Start: 2021-11-05 | End: 2021-11-11

## 2021-11-05 RX ORDER — GLUCAGON INJECTION, SOLUTION 0.5 MG/.1ML
1 INJECTION, SOLUTION SUBCUTANEOUS ONCE
Refills: 0 | Status: DISCONTINUED | OUTPATIENT
Start: 2021-11-05 | End: 2021-11-10

## 2021-11-05 RX ORDER — INSULIN LISPRO 100/ML
VIAL (ML) SUBCUTANEOUS
Refills: 0 | Status: DISCONTINUED | OUTPATIENT
Start: 2021-11-05 | End: 2021-11-10

## 2021-11-05 RX ADMIN — LITHIUM CARBONATE 900 MILLIGRAM(S): 300 TABLET, EXTENDED RELEASE ORAL at 20:44

## 2021-11-05 RX ADMIN — Medication 10 MILLIGRAM(S): at 12:23

## 2021-11-05 RX ADMIN — Medication 10 MILLIGRAM(S): at 09:24

## 2021-11-05 RX ADMIN — APIXABAN 5 MILLIGRAM(S): 2.5 TABLET, FILM COATED ORAL at 20:44

## 2021-11-05 RX ADMIN — ESCITALOPRAM OXALATE 15 MILLIGRAM(S): 10 TABLET, FILM COATED ORAL at 09:24

## 2021-11-05 RX ADMIN — METFORMIN HYDROCHLORIDE 1000 MILLIGRAM(S): 850 TABLET ORAL at 20:44

## 2021-11-05 NOTE — PROGRESS NOTE BEHAVIORAL HEALTH - NSBHFUPINTERVALHXFT_PSY_A_CORE
11/4/2021 Pt was agitated was angered by trigger words such as use of pronouns of she her  and had attempted to punch a CNA , and was also punching the window in her room .  Pt was given prn medication .  Still needing to adjust the dose of the lithium as the level is 0.4 which is low. Pt admitting to be noncompliant with the lithium 11/5/2021 Pt with less irritable mood .  Pt indicated she is on metformin and will be able to restart this .  Pt with less verbal threats.   11/4/2021 Pt was agitated was angered by trigger words such as use of pronouns of she her  and had attempted to punch a CNA , and was also punching the window in her room .  Pt was given prn medication .  Still needing to adjust the dose of the lithium as the level is 0.4 which is low. Pt admitting to be noncompliant with the lithium

## 2021-11-05 NOTE — PROGRESS NOTE BEHAVIORAL HEALTH - NSBHCONSULTMEDSEVERE_PSY_A_CORE FT
Haldol 5 mg PO/IM, Ativan 2 mg PO/IM, and Benadryl 50 mg PO/IM PRN Q6H for agitation.

## 2021-11-05 NOTE — H&P ADULT - ASSESSMENT
22 y/o F with a PMHx of autism, borderline personality disorder, diabetes, depression, JEREMY s/p open heart surgery on Eliquis from blood clot due to birth control brought in by EMS & [facility] from [facility] for evaluation of SI. Pt states her grandmother passed away on 10/29/21 and has been very emotional since. pt told group home staff that she had suicidal ideations, but no plan so was sent to ED for eval. Denies SI and HI at present. Pt has a history of suicide attempts, cutting, and previous psych hospitalizations. States was also diagnosed w/ Arfid at [facility] in [place] but not by her psychiatrist. Denies drug, alcohol, or cigarette use. Denies auditory hallucinations. LMP last week, states periods are typically irregular. PCP: Dr. Ferrer ([place]), psychiatrist: Dr. Damian Hale ([place]). Pt identifies as transgender, uses pronouns "he/him & they/them  Pt to be admitted voluntary. by psychiatry - who plans to transfer to [facility] once bed available .     A/P  hx open heart surgery, hx cardiac thrombus  on eliquis  continue eliquis    Hx DM type 2, now controlled with diet and metformin   hold metformin while inpatient  continue insulin sliding scale   as per pt her last A1c was 5.4 a few weeks ago    suicidal ideation /hx depression/autism/ADHD  continue management per psych     f/u with PMD as outpatient  no active medical issues - will sign  off   please reconsult as needed          22 y/o F with a PMHx of autism, borderline personality disorder, diabetes, depression, JEREMY s/p open heart surgery on Eliquis from blood clot due to birth control brought in by EMS & [facility] from [facility] for evaluation of SI. Pt states her grandmother passed away on 10/29/21 and has been very emotional since. pt told group home staff that she had suicidal ideations, but no plan so was sent to ED for eval. Denies SI and HI at present. Pt has a history of suicide attempts, cutting, and previous psych hospitalizations. States was also diagnosed w/ Arfid at [facility] in [place] but not by her psychiatrist. Denies drug, alcohol, or cigarette use. Denies auditory hallucinations. LMP last week, states periods are typically irregular. PCP: Dr. Ferrer ([place]), psychiatrist: Dr. Damian Hale ([place]). Pt identifies as transgender, uses pronouns "he/him & they/them  Pt to be admitted voluntary. by psychiatry - who plans to transfer to [facility] once bed available .     A/P  hx open heart surgery, hx cardiac thrombus  on eliquis  continue eliquis    Hx DM type 2, now controlled with diet and metformin   hold metformin while inpatient  continue insulin sliding scale   as per pt her last A1c was 5.4 a few weeks ago    suicidal ideation /hx depression/autism/ADHD  continue management per psych     f/u with PMD as outpatient  no active medical issues - will sign  off   please reconsult as needed  I dw with mother hosea pack           20 y/o F with a PMHx of autism, borderline personality disorder, diabetes, depression, JEREMY s/p open heart surgery on Eliquis from blood clot due to birth control brought in by EMS & [facility] from [facility] for evaluation of SI. Pt states her grandmother passed away on 10/29/21 and has been very emotional since. pt told group home staff that she had suicidal ideations, but no plan so was sent to ED for eval. Denies SI and HI at present. Pt has a history of suicide attempts, cutting, and previous psych hospitalizations. States was also diagnosed w/ Arfid at [facility] in [place] but not by her psychiatrist. Denies drug, alcohol, or cigarette use. Denies auditory hallucinations. LMP last week, states periods are typically irregular. PCP: Dr. Ferrer ([place]), psychiatrist: Dr. Damian Hale ([place]). Pt identifies as transgender, uses pronouns "he/him & they/them  Pt to be admitted voluntary. by psychiatry - who plans to transfer to [facility] once bed available .     A/P  hx open heart surgery, hx cardiac thrombus  on eliquis  continue eliquis    Hx DM type 2, now controlled with diet and metformin   hold metformin while inpatient  continue insulin sliding scale   as per pt her last A1c was 5.4 a few weeks ago    suicidal ideation /hx depression/autism/ADHD  continue management per psych     chronic mild leukocytosis   similar leukocytosis in sept   clinically no evidence of active infection   outpatient f/u with PCP  and outpatient hematology     f/u with PMD as outpatient  no active medical issues - will sign  off   please reconsult as needed  I re with mother hosea pack

## 2021-11-05 NOTE — PROGRESS NOTE BEHAVIORAL HEALTH - NSBHCHARTREVIEWLAB_PSY_A_CORE FT
11.4   12.53 )-----------( 345      ( 02 Nov 2021 17:02 )             37.5       11-02    138  |  110<H>  |  12  ----------------------------<  116<H>  3.8   |  22  |  1.01    Ca    8.7      02 Nov 2021 17:02    TPro  6.6  /  Alb  3.2<L>  /  TBili  0.1<L>  /  DBili  x   /  AST  15  /  ALT  21  /  AlkPhos  90  11-02                      Lactate Trend            CAPILLARY BLOOD GLUCOSE

## 2021-11-05 NOTE — PROGRESS NOTE BEHAVIORAL HEALTH - NSBHCONSULTPRIMARYDISCUSSYES_PSY_A_CORE FT
As per HPI / Formulation / Summary

## 2021-11-05 NOTE — PROGRESS NOTE BEHAVIORAL HEALTH - NSBHCONSULTMEDS_PSY_A_CORE FT
MEDICATIONS  (STANDING):  apixaban 5 milliGRAM(s) Oral Once  escitalopram 15 milliGRAM(s) Oral daily  lithium  Oral Tab/Cap - Peds 600 milliGRAM(s) Oral at bedtime  LORazepam     Tablet 1 milliGRAM(s) Oral Once  methylphenidate ER (CONCERTA) 36 milliGRAM(s) Oral Once

## 2021-11-05 NOTE — PROGRESS NOTE BEHAVIORAL HEALTH - RISK ASSESSMENT
HIGH RISK     ACUTE RISK FACTORS: depressed mood, irritable, labile, mood dysregulation, hopelessness, impulsivity, suicidal ideation, suicidal behaviors, self harm     CHRONIC RISK FACTORS: Mood disorder, Depression, Anxiety, ?Bipolar, Borderline personality disorder, history of suicide attempt, history of in-patient hospitalization    PROTECTIVE FACTORS: help-seeking moderate  RISK  pt with gradual decreased irritable mood. denies any suicidal ideation intent or plan, improved mood    mitigating Pt denies any intent or plan for self injury    CHRONIC RISK FACTORS: Mood disorder, Depression, Anxiety, ?Bipolar, Borderline personality disorder, history of suicide attempt, history of in-patient hospitalization    PROTECTIVE FACTORS: help-seeking

## 2021-11-05 NOTE — H&P ADULT - NSHPPHYSICALEXAM_GEN_ALL_CORE
PHYSICAL EXAM:    Daily     Daily     ICU Vital Signs Last 24 Hrs  T(C): 36.7 (05 Nov 2021 07:53), Max: 36.7 (05 Nov 2021 07:53)  T(F): 98.1 (05 Nov 2021 07:53), Max: 98.1 (05 Nov 2021 07:53)  HR: --  BP: --  BP(mean): --  ABP: --  ABP(mean): --  RR: 18 (05 Nov 2021 07:53) (18 - 18)  SpO2: 100% (05 Nov 2021 07:53) (100% - 100%)      Constitutional: Well appearing  HEENT: Atraumatic, DONN, Normal, No congestion  Respiratory: Breath Sounds normal, no rhonchi/wheeze  Cardiovascular: N S1S2  Gastrointestinal: Abdomen soft, non tender, Bowel Ssounds present  Extremities: No edema, peripheral pulses present  Neurological: awake, alert no gross focal motor deficits

## 2021-11-05 NOTE — PROGRESS NOTE BEHAVIORAL HEALTH - NSBHCONSULTFOLLOWDETAILS_PSY_A_CORE FT
slated for admission: preferring SO - bed search underway.

## 2021-11-05 NOTE — H&P ADULT - HISTORY OF PRESENT ILLNESS
22 y/o F with a PMHx of autism, borderline personality disorder, diabetes, depression, JEREMY s/p open heart surgery on Eliquis from blood clot due to birth control brought in by EMS & [facility] from [facility] for evaluation of SI. Pt states her grandmother passed away on 10/29/21 and has been very emotional since. pt told group home staff that she had suicidal ideations, but no plan so was sent to ED for eval. Denies SI and HI at present. Pt has a history of suicide attempts, cutting, and previous psych hospitalizations. States was also diagnosed w/ Arfid at [facility] in [place] but not by her psychiatrist. Denies drug, alcohol, or cigarette use. Denies auditory hallucinations. LMP last week, states periods are typically irregular. PCP: Dr. Ferrer ([place]), psychiatrist: Dr. Damian Hale ([place]). Pt identifies as transgender, uses pronouns "he/him & they/them  Pt to be admitted voluntary. by psychiatry - who plans to transfer to [facility] once bed available .   EKG NSR with sinus arrhythmia , afebrile   ROS patient denies CP, sob, abd pain, fever , chills, diarrhea, dysuria,

## 2021-11-06 LAB
A1C WITH ESTIMATED AVERAGE GLUCOSE RESULT: 5.3 % — SIGNIFICANT CHANGE UP (ref 4–5.6)
ESTIMATED AVERAGE GLUCOSE: 105 MG/DL — SIGNIFICANT CHANGE UP (ref 68–114)

## 2021-11-06 PROCEDURE — 99232 SBSQ HOSP IP/OBS MODERATE 35: CPT

## 2021-11-06 RX ADMIN — Medication 10 MILLIGRAM(S): at 13:31

## 2021-11-06 RX ADMIN — Medication 10 MILLIGRAM(S): at 09:14

## 2021-11-06 RX ADMIN — METFORMIN HYDROCHLORIDE 1000 MILLIGRAM(S): 850 TABLET ORAL at 20:35

## 2021-11-06 RX ADMIN — APIXABAN 5 MILLIGRAM(S): 2.5 TABLET, FILM COATED ORAL at 20:34

## 2021-11-06 RX ADMIN — ESCITALOPRAM OXALATE 15 MILLIGRAM(S): 10 TABLET, FILM COATED ORAL at 09:12

## 2021-11-06 RX ADMIN — METFORMIN HYDROCHLORIDE 1000 MILLIGRAM(S): 850 TABLET ORAL at 09:14

## 2021-11-06 RX ADMIN — LITHIUM CARBONATE 900 MILLIGRAM(S): 300 TABLET, EXTENDED RELEASE ORAL at 20:34

## 2021-11-06 RX ADMIN — APIXABAN 5 MILLIGRAM(S): 2.5 TABLET, FILM COATED ORAL at 09:14

## 2021-11-06 NOTE — PROGRESS NOTE BEHAVIORAL HEALTH - RISK ASSESSMENT
Moderate  acute  RISK:  pt with gradual decreased irritable mood. denies any suicidal ideation intent or plan, improved mood    mitigating Pt denies any intent or plan for self injury    CHRONIC RISK FACTORS: Mood disorder, Depression, Anxiety, ?Bipolar, Borderline personality disorder, history of suicide attempt, history of in-patient hospitalization    PROTECTIVE FACTORS: help-seeking

## 2021-11-06 NOTE — PROGRESS NOTE BEHAVIORAL HEALTH - NSBHFUPINTERVALHXFT_PSY_A_CORE
Pt is visible on the unit, on CO, states she is ding well today. denies anger, denies sleep and appetite problems, denies being depressed. Denies SI and HI. Denies AH,. However,. reminds unpredictable and  impulsive.

## 2021-11-07 LAB
CHOLEST SERPL-MCNC: 160 MG/DL — SIGNIFICANT CHANGE UP
HDLC SERPL-MCNC: 42 MG/DL — LOW
LIPID PNL WITH DIRECT LDL SERPL: 83 MG/DL — SIGNIFICANT CHANGE UP
NON HDL CHOLESTEROL: 118 MG/DL — SIGNIFICANT CHANGE UP
TRIGL SERPL-MCNC: 171 MG/DL — HIGH
TSH SERPL-MCNC: 2.98 UU/ML — SIGNIFICANT CHANGE UP (ref 0.34–4.82)

## 2021-11-07 PROCEDURE — 99232 SBSQ HOSP IP/OBS MODERATE 35: CPT

## 2021-11-07 RX ADMIN — Medication 10 MILLIGRAM(S): at 08:15

## 2021-11-07 RX ADMIN — APIXABAN 5 MILLIGRAM(S): 2.5 TABLET, FILM COATED ORAL at 08:15

## 2021-11-07 RX ADMIN — APIXABAN 5 MILLIGRAM(S): 2.5 TABLET, FILM COATED ORAL at 20:22

## 2021-11-07 RX ADMIN — LITHIUM CARBONATE 900 MILLIGRAM(S): 300 TABLET, EXTENDED RELEASE ORAL at 20:22

## 2021-11-07 RX ADMIN — ESCITALOPRAM OXALATE 15 MILLIGRAM(S): 10 TABLET, FILM COATED ORAL at 08:15

## 2021-11-07 RX ADMIN — METFORMIN HYDROCHLORIDE 1000 MILLIGRAM(S): 850 TABLET ORAL at 20:22

## 2021-11-07 RX ADMIN — METFORMIN HYDROCHLORIDE 1000 MILLIGRAM(S): 850 TABLET ORAL at 08:15

## 2021-11-07 RX ADMIN — Medication 10 MILLIGRAM(S): at 12:17

## 2021-11-07 NOTE — PROGRESS NOTE BEHAVIORAL HEALTH - NSBHFUPINTERVALHXFT_PSY_A_CORE
Pt is in good behavioral health over the weekend. She is visible on the unit, states she is doing better Denies sleep and appetite problems, denies being depressed. Denies SI and HI. Denies AH.   She is interacting with peers

## 2021-11-08 PROCEDURE — 99232 SBSQ HOSP IP/OBS MODERATE 35: CPT

## 2021-11-08 RX ADMIN — APIXABAN 5 MILLIGRAM(S): 2.5 TABLET, FILM COATED ORAL at 20:54

## 2021-11-08 RX ADMIN — Medication 10 MILLIGRAM(S): at 09:23

## 2021-11-08 RX ADMIN — METFORMIN HYDROCHLORIDE 1000 MILLIGRAM(S): 850 TABLET ORAL at 09:22

## 2021-11-08 RX ADMIN — LITHIUM CARBONATE 900 MILLIGRAM(S): 300 TABLET, EXTENDED RELEASE ORAL at 20:54

## 2021-11-08 RX ADMIN — Medication 10 MILLIGRAM(S): at 12:48

## 2021-11-08 RX ADMIN — ESCITALOPRAM OXALATE 15 MILLIGRAM(S): 10 TABLET, FILM COATED ORAL at 09:23

## 2021-11-08 RX ADMIN — METFORMIN HYDROCHLORIDE 1000 MILLIGRAM(S): 850 TABLET ORAL at 20:53

## 2021-11-08 RX ADMIN — APIXABAN 5 MILLIGRAM(S): 2.5 TABLET, FILM COATED ORAL at 09:22

## 2021-11-08 NOTE — PROGRESS NOTE BEHAVIORAL HEALTH - NSBHFUPINTERVALHXFT_PSY_A_CORE
11/8/2021 Pt with less irritable mood and is able to be redirected.  Pt with no added sedation .Pt denies any side effects .  Lithium level is due tomorrow 11/9/2021 .

## 2021-11-08 NOTE — PROGRESS NOTE BEHAVIORAL HEALTH - RISK ASSESSMENT
low  acute  RISK:  pt with gradual decreased irritable mood. denies any suicidal ideation intent or plan, improved mood    mitigating Pt denies any intent or plan for self injury    CHRONIC RISK FACTORS: Mood disorder, Depression, Anxiety, ?Bipolar, Borderline personality disorder, history of suicide attempt, history of in-patient hospitalization    PROTECTIVE FACTORS: help-seeking

## 2021-11-09 PROCEDURE — 99231 SBSQ HOSP IP/OBS SF/LOW 25: CPT

## 2021-11-09 RX ORDER — IBUPROFEN 200 MG
400 TABLET ORAL ONCE
Refills: 0 | Status: COMPLETED | OUTPATIENT
Start: 2021-11-09 | End: 2021-11-09

## 2021-11-09 RX ADMIN — Medication 10 MILLIGRAM(S): at 09:30

## 2021-11-09 RX ADMIN — LITHIUM CARBONATE 900 MILLIGRAM(S): 300 TABLET, EXTENDED RELEASE ORAL at 21:06

## 2021-11-09 RX ADMIN — APIXABAN 5 MILLIGRAM(S): 2.5 TABLET, FILM COATED ORAL at 09:32

## 2021-11-09 RX ADMIN — ESCITALOPRAM OXALATE 15 MILLIGRAM(S): 10 TABLET, FILM COATED ORAL at 09:43

## 2021-11-09 RX ADMIN — APIXABAN 5 MILLIGRAM(S): 2.5 TABLET, FILM COATED ORAL at 21:06

## 2021-11-09 RX ADMIN — METFORMIN HYDROCHLORIDE 1000 MILLIGRAM(S): 850 TABLET ORAL at 21:06

## 2021-11-09 RX ADMIN — METFORMIN HYDROCHLORIDE 1000 MILLIGRAM(S): 850 TABLET ORAL at 09:32

## 2021-11-09 RX ADMIN — Medication 10 MILLIGRAM(S): at 13:12

## 2021-11-09 NOTE — PROGRESS NOTE BEHAVIORAL HEALTH - NSBHFUPINTERVALHXFT_PSY_A_CORE
Pt refusing to allow bloodwork including the lithium level , last level was at 0.2  at 600mg .  Pt with no tremors, no ataxia or slurring  of speech. Pt is goal directed.  Pt with decreased irritable mood .She denies any suicidal ideation intent or plan . Tried t offer pt the option to change the medication to ones not needing a blood level but pt refused.

## 2021-11-10 PROCEDURE — 99232 SBSQ HOSP IP/OBS MODERATE 35: CPT

## 2021-11-10 RX ORDER — APIXABAN 2.5 MG/1
1 TABLET, FILM COATED ORAL
Qty: 30 | Refills: 1
Start: 2021-11-10 | End: 2021-12-09

## 2021-11-10 RX ORDER — LITHIUM CARBONATE 300 MG/1
3 TABLET, EXTENDED RELEASE ORAL
Qty: 45 | Refills: 1
Start: 2021-11-10 | End: 2021-12-09

## 2021-11-10 RX ORDER — METFORMIN HYDROCHLORIDE 850 MG/1
1 TABLET ORAL
Qty: 30 | Refills: 1
Start: 2021-11-10 | End: 2021-12-09

## 2021-11-10 RX ORDER — METHYLPHENIDATE HCL 5 MG
1 TABLET ORAL
Qty: 15 | Refills: 0
Start: 2021-11-10 | End: 2021-11-24

## 2021-11-10 RX ORDER — ESCITALOPRAM OXALATE 10 MG/1
3 TABLET, FILM COATED ORAL
Qty: 45 | Refills: 1
Start: 2021-11-10 | End: 2021-12-09

## 2021-11-10 RX ADMIN — LITHIUM CARBONATE 900 MILLIGRAM(S): 300 TABLET, EXTENDED RELEASE ORAL at 20:45

## 2021-11-10 RX ADMIN — Medication 10 MILLIGRAM(S): at 09:55

## 2021-11-10 RX ADMIN — ESCITALOPRAM OXALATE 15 MILLIGRAM(S): 10 TABLET, FILM COATED ORAL at 10:28

## 2021-11-10 RX ADMIN — METFORMIN HYDROCHLORIDE 1000 MILLIGRAM(S): 850 TABLET ORAL at 09:50

## 2021-11-10 RX ADMIN — METFORMIN HYDROCHLORIDE 1000 MILLIGRAM(S): 850 TABLET ORAL at 20:44

## 2021-11-10 RX ADMIN — APIXABAN 5 MILLIGRAM(S): 2.5 TABLET, FILM COATED ORAL at 09:50

## 2021-11-10 RX ADMIN — Medication 10 MILLIGRAM(S): at 14:14

## 2021-11-10 RX ADMIN — APIXABAN 5 MILLIGRAM(S): 2.5 TABLET, FILM COATED ORAL at 20:45

## 2021-11-10 NOTE — DISCHARGE NOTE BEHAVIORAL HEALTH - NSBHDCTHERAPYFT_PSY_A_CORE
-Psychosocial assessment  -Individual, group and milieu therapies  -Psychoeducation related to pt's diagnosis and treatment  -Safety planning  -Family/support system engagement   -Discharge planning -Psychosocial assessment  -Individual, supportive group and milieu therapies  -Psychoeducation related to pt's diagnosis and treatment  -Safety planning  -Family/support system engagement   -Discharge planning

## 2021-11-10 NOTE — PROGRESS NOTE BEHAVIORAL HEALTH - NSBHCHARTREVIEWINVESTIGATE_PSY_A_CORE FT
QRS axis to [] ° and NSR at a rate of [] BPM. There was no atrial enlargement. There was no ventricular hypertrophy. There were no ST-T changes and all intervals were normal.
QRS axis to [] ° and NSR at a rate of [] BPM. There was no atrial enlargement. There was no ventricular hypertrophy. There were no ST-T changes and all intervals were normal.
Ventricular Rate 72 BPM    Atrial Rate 72 BPM    P-R Interval 150 ms    QRS Duration 72 ms    Q-T Interval 394 ms    QTC Calculation(Bazett) 431 ms    P Axis 38 degrees    R Axis 73 degrees    T Axis 60 degrees    Diagnosis Line Normal sinus rhythmwith sinus arrhythmia  Normal ECG  When compared with ECG of 24-SEP-2021 13:52,  T wave inversion no longer evident in Anterior leads  Confirmed by CRAIG VARGAS MD ([id number]) on 11/3/2021 7:05:15 AM

## 2021-11-10 NOTE — DISCHARGE NOTE BEHAVIORAL HEALTH - NSBHDCHOUSINGFT_PSY_A_CORE
ACLD residence - ACLD residence - [address] [place] [id number] ACLD residence - [address] [facility] [address] [phone] ([place]) ACLD residence - [address] [facility] [address] [phone] (house)/  [place] [phone]

## 2021-11-10 NOTE — PROGRESS NOTE BEHAVIORAL HEALTH - PROBLEM SELECTOR PLAN 2
encourage to attend groups for insight and coping skills

## 2021-11-10 NOTE — DISCHARGE NOTE BEHAVIORAL HEALTH - MEDICATION SUMMARY - MEDICATIONS TO TAKE
I will START or STAY ON the medications listed below when I get home from the hospital:    apixaban 5 mg oral tablet  -- 1 tab(s) by mouth 2 times a day  -- Indication: For Anticogulant    escitalopram 5 mg oral tablet  -- 3 tab(s) by mouth once a day  -- Indication: For Depression    metFORMIN 1000 mg oral tablet  -- 1 tab(s) by mouth 2 times a day  -- Indication: For DM    lithium 300 mg oral capsule  -- 3 cap(s) by mouth once a day (at bedtime) MDD:900mg  -- Indication: For Depression    methylphenidate 10 mg oral tablet  -- 1 tab(s) by mouth once a day MDD:10mg  -- Indication: For Autism

## 2021-11-10 NOTE — PROGRESS NOTE BEHAVIORAL HEALTH - NSBHADMITIPOBS_PSY_A_CORE
Routine observation
Constant observation
Routine observation
Constant observation
Routine observation

## 2021-11-10 NOTE — DISCHARGE NOTE BEHAVIORAL HEALTH - NSBHDCMEDSFT_PSY_A_CORE
MEDICATIONS  (STANDING):  apixaban 5 milliGRAM(s) Oral two times a day  escitalopram 15 milliGRAM(s) Oral daily  lithium 900 milliGRAM(s) Oral at bedtime  metFORMIN 1000 milliGRAM(s) Oral two times a day  methylphenidate 10 milliGRAM(s) Oral   Pt is directed to continue with all medications until directed by his MD to change or discontinued with medications Pt with increased goal directed speech. Pt seems less preoccupied . Pt denies any suicidal ideation intent or plan

## 2021-11-10 NOTE — DISCHARGE NOTE BEHAVIORAL HEALTH - NSBHDCSUICFCTRMIT_PSY_A_CORE
pt verbalize not wanting the medication to be changed  pt with history of good response to current medications

## 2021-11-10 NOTE — DISCHARGE NOTE BEHAVIORAL HEALTH - NSBHDCSIGEVENTSFT_PSY_A_CORE
pt initially with increased aggressive behavior in response to trigger words in particular if  he was addressed with wrong pronoun

## 2021-11-10 NOTE — PROGRESS NOTE BEHAVIORAL HEALTH - NSBHPTASSESSDT_PSY_A_CORE
04-Nov-2021 15:50
05-Nov-2021 09:01
03-Nov-2021 10:53
06-Nov-2021 09:38
09-Nov-2021 11:14
07-Nov-2021 11:24
10-Nov-2021 11:54
08-Nov-2021 18:01
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
## 2021-11-10 NOTE — DISCHARGE NOTE BEHAVIORAL HEALTH - NSBHDCSWCOMMENTSFT_PSY_A_CORE
Pt. and staff were educated on the importance of pt. taking medications as prescribed and to not stop or miss any doses unless directed by prescribing provider. Pt. and staff were counseled on the importance of pt. attending outpatient appointments for ongoing development of coping skills in particular. Pt. and staff were made aware of crisis resources to use after hours as needed including returning to the hospital.    Pt. was educated regarding safety precautions for COVID-19 including hand hygiene, wearing a mask, and maintaining social distancing and advised to seek medical attention if experiencing any symptoms. Pt. provided written handout of COVID-19 symptoms and management in discharge packet.

## 2021-11-10 NOTE — DISCHARGE NOTE BEHAVIORAL HEALTH - CONDITIONS AT DISCHARGE
Pt A&Ox4 and denies any pain/discomfort. Pt was educated by SW and RN about discharge instructions and medication information and verbalized understanding. Pt denies SI/HI, denies AH/VH. Pt has all valuables and belongings in their possession. Pt is in clothing appropriate for weather and situation. Pt A&Ox4 and denies any pain/discomfort. Pt was educated by SW and RN about discharge instructions and medication information and verbalized understanding. Pt denies SI/HI, denies AH/VH. Pt has all valuables and belongings in their possession. Therapist and nurse reviewed safety plan with patient who denies any thoughts to self harm or others. Pt is in clothing appropriate for weather and situation.

## 2021-11-10 NOTE — PROGRESS NOTE BEHAVIORAL HEALTH - NSBHATTESTSEENBY_PSY_A_CORE
attending Psychiatrist without NP/Trainee
NP with telephonic supervision from Attending Psychiatrist
attending Psychiatrist without NP/Trainee
NP with telephonic supervision from Attending Psychiatrist
NP with telephonic supervision from Attending Psychiatrist
attending Psychiatrist without NP/Trainee

## 2021-11-10 NOTE — DISCHARGE NOTE BEHAVIORAL HEALTH - FAMILY HISTORY OF PSYCHIATRIC ILLNESS
Pt was adopted @ 1 week old. Birth Mother is dx with borderline personality disorder and is a substance use. Biological sister is dx with Bipolar and is a substance use.

## 2021-11-10 NOTE — DISCHARGE NOTE BEHAVIORAL HEALTH - NSBHDCADDR1FT_A_CORE
[address]  [place] [address]  Pt's [facility] group home will schedule appt as per manager Aki Carlos

## 2021-11-10 NOTE — PROGRESS NOTE BEHAVIORAL HEALTH - PROBLEM SELECTOR PLAN 1
continue with lithium   encourage to attend groups for insight and coping skills

## 2021-11-10 NOTE — PROGRESS NOTE BEHAVIORAL HEALTH - NSBHFUPTYPE_PSY_A_CORE
Inpatient
Inpatient-On Service Note
Inpatient
Inpatient
Consult Follow Up
Inpatient
Consult Follow Up
Inpatient

## 2021-11-10 NOTE — PROGRESS NOTE BEHAVIORAL HEALTH - NSBHADMITIPOBSFT_PSY_A_CORE
high acute risk
pt denies any suicidal ideation intent or plan
pt denies any suicidal ideation intent or plan
high acute risk
pt denies any suicidal ideation intent or plan

## 2021-11-10 NOTE — DISCHARGE NOTE BEHAVIORAL HEALTH - NSBHDCCRISISPLAN2FT_PSY_A_CORE
[facility]: [phone]  QUENTIN Watson LMSW  Psychiatrist-  Dr. Shah [phone]

## 2021-11-10 NOTE — DISCHARGE NOTE BEHAVIORAL HEALTH - MEDICATION SUMMARY - MEDICATIONS TO STOP TAKING
I will STOP taking the medications listed below when I get home from the hospital:    ibuprofen 600 mg oral tablet  -- 1 tab(s) by mouth every 6 hours   -- Do not take this drug if you are pregnant.  It is very important that you take or use this exactly as directed.  Do not skip doses or discontinue unless directed by your doctor.  May cause drowsiness or dizziness.  Obtain medical advice before taking any non-prescription drugs as some may affect the action of this medication.  Take with food or milk.

## 2021-11-10 NOTE — DISCHARGE NOTE BEHAVIORAL HEALTH - NSBHDCSUICSAFETYFT_PSY_A_CORE
Advised to return to hospital or go to nearest ED or call 911 or (971) LIFENET or [phone]) 105 TALK hotlines for any severe, worsening or persistent symptoms including suicidal/homicidal ideations, intent or plans. Patient verbalized understanding of instructions.

## 2021-11-10 NOTE — PROGRESS NOTE BEHAVIORAL HEALTH - NSBHFUPINTERVALHXFT_PSY_A_CORE
11/10/2021 Pt with level of 0.6 for the 900mn of lithium .  Pt still complaining of being on the higher dose of the medication   11/9/2021 Pt refusing to allow blood work including the lithium level , last level was at 0.2  at 600mg .  Pt with no tremors, no ataxia or slurring  of speech. Pt is goal directed.  Pt with decreased irritable mood .She denies any suicidal ideation intent or plan . Tried t offer pt the option to change the medication to ones not needing a blood level but pt refused.

## 2021-11-10 NOTE — DISCHARGE NOTE BEHAVIORAL HEALTH - NSBHDCREFEROTHERFT_PSY_A_CORE
[facility]- for psychiatric crises (available AFTER HOURS)  24/7 hotline: [phone]  Address: [address] [place] [address] [place], [address] Crisis Center: [phone]  *[facility] Center for psychiatric needs. Open 9am-5pm, M-F.  1-888-[facility]-WELL (2-788-838-9439)    National Suicide Prevention Lifeline 1-875.117.9568

## 2021-11-10 NOTE — DISCHARGE NOTE BEHAVIORAL HEALTH - NSBHDCCRISISPLAN1FT_PSY_A_CORE
Tell a trusted friend/family member, tell housing staff, tell clinic staff, call a crisis line ( Crisis Center ph. [phone], [facility] [phone], [facility] (peer support) ph. [phone]), return to the nearest emergency room. You may call 9-1-1 for assistance.

## 2021-11-10 NOTE — DISCHARGE NOTE BEHAVIORAL HEALTH - NSBHDCSUICFCTRSFT_PSY_A_CORE
1.mood and affect lability - pt on lithium , Pt is directed to continue with all medications until directed by his MD to change or discontinued with medications  2. suicidal ideation - pt denies any suicidal ideation intent or plan

## 2021-11-10 NOTE — PROGRESS NOTE BEHAVIORAL HEALTH - NSBHCHARTREVIEWVS_PSY_A_CORE FT
Vital Signs Last 24 Hrs  T(C): 36.7 (05 Nov 2021 07:53), Max: 36.7 (05 Nov 2021 07:53)  T(F): 98.1 (05 Nov 2021 07:53), Max: 98.1 (05 Nov 2021 07:53)  HR: --  BP: --  BP(mean): --  RR: 18 (05 Nov 2021 07:53) (18 - 18)  SpO2: 100% (05 Nov 2021 07:53) (100% - 100%)
Vital Signs Last 24 Hrs  T(C): 36.7 (06 Nov 2021 07:38), Max: 36.7 (06 Nov 2021 07:38)  T(F): 98 (06 Nov 2021 07:38), Max: 98 (06 Nov 2021 07:38)    RR: 16 (06 Nov 2021 07:38) (16 - 16)  SpO2: 100% (06 Nov 2021 07:38) (100% - 100%)
Vital Signs Last 24 Hrs  T(C): 36.7 (03 Nov 2021 07:02), Max: 36.8 (03 Nov 2021 02:45)  T(F): 98.1 (03 Nov 2021 07:02), Max: 98.2 (03 Nov 2021 02:45)  HR: 61 (03 Nov 2021 07:02) (61 - 82)  BP: 113/66 (03 Nov 2021 07:02) (113/66 - 121/71)  BP(mean): 78 (03 Nov 2021 07:02) (78 - 78)  RR: 15 (03 Nov 2021 07:02) (15 - 18)  SpO2: 100% (03 Nov 2021 07:02) (100% - 100%)
Vital Signs Last 24 Hrs  T(C): 36.7 (09 Nov 2021 07:28), Max: 36.7 (09 Nov 2021 07:28)  T(F): 98.1 (09 Nov 2021 07:28), Max: 98.1 (09 Nov 2021 07:28)  HR: --  BP: --  BP(mean): --  RR: 18 (09 Nov 2021 07:28) (18 - 18)  SpO2: 100% (09 Nov 2021 07:28) (100% - 100%)
Vital Signs Last 24 Hrs  T(C): 36.7 (03 Nov 2021 07:02), Max: 36.8 (03 Nov 2021 02:45)  T(F): 98.1 (03 Nov 2021 07:02), Max: 98.2 (03 Nov 2021 02:45)  HR: 61 (03 Nov 2021 07:02) (61 - 82)  BP: 113/66 (03 Nov 2021 07:02) (113/66 - 121/71)  BP(mean): 78 (03 Nov 2021 07:02) (78 - 78)  RR: 15 (03 Nov 2021 07:02) (15 - 18)  SpO2: 100% (03 Nov 2021 07:02) (100% - 100%)
Vital Signs Last 24 Hrs  T(C): 36.5 (07 Nov 2021 07:20), Max: 36.5 (07 Nov 2021 07:20)  T(F): 97.7 (07 Nov 2021 07:20), Max: 97.7 (07 Nov 2021 07:20)    RR: 14 (07 Nov 2021 07:20) (14 - 14)  SpO2: 99% (07 Nov 2021 07:20) (99% - 99%)
Vital Signs Last 24 Hrs  T(C): 36.8 (10 Nov 2021 07:17), Max: 36.8 (10 Nov 2021 07:17)  T(F): 98.2 (10 Nov 2021 07:17), Max: 98.2 (10 Nov 2021 07:17)  HR: --  BP: --  BP(mean): --  RR: 16 (10 Nov 2021 07:17) (16 - 16)  SpO2: 100% (10 Nov 2021 07:17) (100% - 100%)
Vital Signs Last 24 Hrs  T(C): 36.6 (08 Nov 2021 07:36), Max: 36.6 (08 Nov 2021 07:36)  T(F): 97.9 (08 Nov 2021 07:36), Max: 97.9 (08 Nov 2021 07:36)  HR: --  BP: --  BP(mean): --  RR: 18 (08 Nov 2021 07:36) (18 - 18)  SpO2: 100% (08 Nov 2021 07:36) (100% - 100%)

## 2021-11-10 NOTE — PROGRESS NOTE BEHAVIORAL HEALTH - NSBHCONSORIP_PSY_A_CORE
Consult...
Inpatient Admission...
Consult...
Consult...
Inpatient Admission...

## 2021-11-10 NOTE — DISCHARGE NOTE BEHAVIORAL HEALTH - NSBHDCRESPONSEFT_PSY_A_CORE
improved with decreased episodes of agitation and aggression.  Pt with increased goal directed speech.

## 2021-11-10 NOTE — DISCHARGE NOTE BEHAVIORAL HEALTH - NSBHDCPURPOSE2FT_PSY_A_CORE
Outpatient Individual, group, medication management, safety planning with 15 min safety checks, discharge planning with family involvement as needed.

## 2021-11-10 NOTE — DISCHARGE NOTE BEHAVIORAL HEALTH - NSBHDCADDFT_PSY_A_CORE
HgA1c A1C with Estimated Average Glucose Result: 5.3: Method: Immunoassay       Reference Range                4.0-5.6%       High risk (prediabetic)        5.7-6.4%       Diabetic, diagnostic             >=6.5%       ADA diabetic treatment goal       <7.0%  The Hemoglobin A1c testing is NGSP-certified.Reference ranges are based  upon the 2010 recommendations of  the American Diabetes Association.  Interpretation may vary for children  and adolescents. % (11.06.21 @ 08:21)    Lithium Level, Serum: 0.7 mmol/L (11.10.21 @ 07:35) for 900mg of lithium     11-07 Chol 160 LDL -- HDL 42<L> Trig 171<H>

## 2021-11-11 VITALS — TEMPERATURE: 98 F | RESPIRATION RATE: 18 BRPM | OXYGEN SATURATION: 100 %

## 2021-11-11 PROCEDURE — 99238 HOSP IP/OBS DSCHRG MGMT 30/<: CPT

## 2021-11-18 DIAGNOSIS — F32.9 MAJOR DEPRESSIVE DISORDER, SINGLE EPISODE, UNSPECIFIED: ICD-10-CM

## 2021-11-18 DIAGNOSIS — F84.0 AUTISTIC DISORDER: ICD-10-CM

## 2021-11-18 DIAGNOSIS — Z91.14 PATIENT'S OTHER NONCOMPLIANCE WITH MEDICATION REGIMEN: ICD-10-CM

## 2021-11-18 DIAGNOSIS — E11.9 TYPE 2 DIABETES MELLITUS WITHOUT COMPLICATIONS: ICD-10-CM

## 2021-11-18 DIAGNOSIS — F60.3 BORDERLINE PERSONALITY DISORDER: ICD-10-CM

## 2021-12-03 ENCOUNTER — EMERGENCY (EMERGENCY)
Facility: HOSPITAL | Age: 21
LOS: 0 days | Discharge: HOME | End: 2021-12-03
Attending: EMERGENCY MEDICINE
Payer: COMMERCIAL

## 2021-12-03 VITALS
DIASTOLIC BLOOD PRESSURE: 45 MMHG | SYSTOLIC BLOOD PRESSURE: 117 MMHG | HEIGHT: 65 IN | TEMPERATURE: 98 F | RESPIRATION RATE: 18 BRPM | WEIGHT: 160.06 LBS | OXYGEN SATURATION: 98 % | HEART RATE: 83 BPM

## 2021-12-03 VITALS
OXYGEN SATURATION: 100 % | DIASTOLIC BLOOD PRESSURE: 62 MMHG | RESPIRATION RATE: 18 BRPM | HEART RATE: 71 BPM | SYSTOLIC BLOOD PRESSURE: 104 MMHG | TEMPERATURE: 98 F

## 2021-12-03 DIAGNOSIS — R07.89 OTHER CHEST PAIN: ICD-10-CM

## 2021-12-03 DIAGNOSIS — E11.9 TYPE 2 DIABETES MELLITUS WITHOUT COMPLICATIONS: ICD-10-CM

## 2021-12-03 DIAGNOSIS — Z98.890 OTHER SPECIFIED POSTPROCEDURAL STATES: Chronic | ICD-10-CM

## 2021-12-03 DIAGNOSIS — M54.50 LOW BACK PAIN, UNSPECIFIED: ICD-10-CM

## 2021-12-03 DIAGNOSIS — F84.0 AUTISTIC DISORDER: ICD-10-CM

## 2021-12-03 DIAGNOSIS — R06.02 SHORTNESS OF BREATH: ICD-10-CM

## 2021-12-03 DIAGNOSIS — Z79.01 LONG TERM (CURRENT) USE OF ANTICOAGULANTS: ICD-10-CM

## 2021-12-03 DIAGNOSIS — Z79.84 LONG TERM (CURRENT) USE OF ORAL HYPOGLYCEMIC DRUGS: ICD-10-CM

## 2021-12-03 PROBLEM — F41.1 GENERALIZED ANXIETY DISORDER: Chronic | Status: ACTIVE | Noted: 2021-11-02

## 2021-12-03 PROBLEM — Z86.718 PERSONAL HISTORY OF OTHER VENOUS THROMBOSIS AND EMBOLISM: Chronic | Status: ACTIVE | Noted: 2021-11-02

## 2021-12-03 LAB
ADD ON TEST-SPECIMEN IN LAB: SIGNIFICANT CHANGE UP
ADD ON TEST-SPECIMEN IN LAB: SIGNIFICANT CHANGE UP
ALBUMIN SERPL ELPH-MCNC: 3.3 G/DL — SIGNIFICANT CHANGE UP (ref 3.3–5)
ALP SERPL-CCNC: 97 U/L — SIGNIFICANT CHANGE UP (ref 40–120)
ALT FLD-CCNC: 23 U/L — SIGNIFICANT CHANGE UP (ref 12–78)
ANION GAP SERPL CALC-SCNC: 3 MMOL/L — LOW (ref 5–17)
AST SERPL-CCNC: 11 U/L — LOW (ref 15–37)
BASOPHILS # BLD AUTO: 0.06 K/UL — SIGNIFICANT CHANGE UP (ref 0–0.2)
BASOPHILS NFR BLD AUTO: 0.5 % — SIGNIFICANT CHANGE UP (ref 0–2)
BILIRUB SERPL-MCNC: 0.1 MG/DL — LOW (ref 0.2–1.2)
BUN SERPL-MCNC: 13 MG/DL — SIGNIFICANT CHANGE UP (ref 7–23)
CALCIUM SERPL-MCNC: 9.2 MG/DL — SIGNIFICANT CHANGE UP (ref 8.5–10.1)
CHLORIDE SERPL-SCNC: 106 MMOL/L — SIGNIFICANT CHANGE UP (ref 96–108)
CO2 SERPL-SCNC: 30 MMOL/L — SIGNIFICANT CHANGE UP (ref 22–31)
CREAT SERPL-MCNC: 0.98 MG/DL — SIGNIFICANT CHANGE UP (ref 0.5–1.3)
D DIMER BLD IA.RAPID-MCNC: <150 NG/ML DDU — SIGNIFICANT CHANGE UP
EOSINOPHIL # BLD AUTO: 0.35 K/UL — SIGNIFICANT CHANGE UP (ref 0–0.5)
EOSINOPHIL NFR BLD AUTO: 3.2 % — SIGNIFICANT CHANGE UP (ref 0–6)
GLUCOSE SERPL-MCNC: 85 MG/DL — SIGNIFICANT CHANGE UP (ref 70–99)
HCT VFR BLD CALC: 39.1 % — SIGNIFICANT CHANGE UP (ref 34.5–45)
HGB BLD-MCNC: 11.6 G/DL — SIGNIFICANT CHANGE UP (ref 11.5–15.5)
IMM GRANULOCYTES NFR BLD AUTO: 0.3 % — SIGNIFICANT CHANGE UP (ref 0–1.5)
LYMPHOCYTES # BLD AUTO: 2.62 K/UL — SIGNIFICANT CHANGE UP (ref 1–3.3)
LYMPHOCYTES # BLD AUTO: 23.6 % — SIGNIFICANT CHANGE UP (ref 13–44)
MAGNESIUM SERPL-MCNC: 2.3 MG/DL — SIGNIFICANT CHANGE UP (ref 1.6–2.6)
MCHC RBC-ENTMCNC: 24.3 PG — LOW (ref 27–34)
MCHC RBC-ENTMCNC: 29.7 GM/DL — LOW (ref 32–36)
MCV RBC AUTO: 81.8 FL — SIGNIFICANT CHANGE UP (ref 80–100)
MONOCYTES # BLD AUTO: 0.43 K/UL — SIGNIFICANT CHANGE UP (ref 0–0.9)
MONOCYTES NFR BLD AUTO: 3.9 % — SIGNIFICANT CHANGE UP (ref 2–14)
NEUTROPHILS # BLD AUTO: 7.59 K/UL — HIGH (ref 1.8–7.4)
NEUTROPHILS NFR BLD AUTO: 68.5 % — SIGNIFICANT CHANGE UP (ref 43–77)
NT-PROBNP SERPL-SCNC: 122 PG/ML — SIGNIFICANT CHANGE UP (ref 0–125)
PLATELET # BLD AUTO: 363 K/UL — SIGNIFICANT CHANGE UP (ref 150–400)
POTASSIUM SERPL-MCNC: 4 MMOL/L — SIGNIFICANT CHANGE UP (ref 3.5–5.3)
POTASSIUM SERPL-SCNC: 4 MMOL/L — SIGNIFICANT CHANGE UP (ref 3.5–5.3)
PROT SERPL-MCNC: 7.2 GM/DL — SIGNIFICANT CHANGE UP (ref 6–8.3)
RBC # BLD: 4.78 M/UL — SIGNIFICANT CHANGE UP (ref 3.8–5.2)
RBC # FLD: 14.6 % — HIGH (ref 10.3–14.5)
SARS-COV-2 RNA SPEC QL NAA+PROBE: SIGNIFICANT CHANGE UP
SODIUM SERPL-SCNC: 139 MMOL/L — SIGNIFICANT CHANGE UP (ref 135–145)
TROPONIN I, HIGH SENSITIVITY RESULT: 7.06 NG/L — SIGNIFICANT CHANGE UP
TROPONIN I, HIGH SENSITIVITY RESULT: 7.09 NG/L — SIGNIFICANT CHANGE UP
WBC # BLD: 11.08 K/UL — HIGH (ref 3.8–10.5)
WBC # FLD AUTO: 11.08 K/UL — HIGH (ref 3.8–10.5)

## 2021-12-03 PROCEDURE — 71275 CT ANGIOGRAPHY CHEST: CPT | Mod: 26,MG

## 2021-12-03 PROCEDURE — 74174 CTA ABD&PLVS W/CONTRAST: CPT | Mod: 26,MG

## 2021-12-03 PROCEDURE — G1004: CPT

## 2021-12-03 PROCEDURE — 36415 COLL VENOUS BLD VENIPUNCTURE: CPT

## 2021-12-03 PROCEDURE — 71275 CT ANGIOGRAPHY CHEST: CPT | Mod: MG

## 2021-12-03 PROCEDURE — 85379 FIBRIN DEGRADATION QUANT: CPT

## 2021-12-03 PROCEDURE — 93010 ELECTROCARDIOGRAM REPORT: CPT

## 2021-12-03 PROCEDURE — 84484 ASSAY OF TROPONIN QUANT: CPT | Mod: 91

## 2021-12-03 PROCEDURE — 93005 ELECTROCARDIOGRAM TRACING: CPT

## 2021-12-03 PROCEDURE — 84702 CHORIONIC GONADOTROPIN TEST: CPT

## 2021-12-03 PROCEDURE — 71045 X-RAY EXAM CHEST 1 VIEW: CPT

## 2021-12-03 PROCEDURE — U0005: CPT

## 2021-12-03 PROCEDURE — 80053 COMPREHEN METABOLIC PANEL: CPT

## 2021-12-03 PROCEDURE — U0003: CPT

## 2021-12-03 PROCEDURE — 83880 ASSAY OF NATRIURETIC PEPTIDE: CPT

## 2021-12-03 PROCEDURE — 99284 EMERGENCY DEPT VISIT MOD MDM: CPT | Mod: 25

## 2021-12-03 PROCEDURE — 85025 COMPLETE CBC W/AUTO DIFF WBC: CPT

## 2021-12-03 PROCEDURE — 74174 CTA ABD&PLVS W/CONTRAST: CPT | Mod: MG

## 2021-12-03 PROCEDURE — 71045 X-RAY EXAM CHEST 1 VIEW: CPT | Mod: 26

## 2021-12-03 PROCEDURE — 83735 ASSAY OF MAGNESIUM: CPT

## 2021-12-03 PROCEDURE — 99285 EMERGENCY DEPT VISIT HI MDM: CPT | Mod: CS,GC

## 2021-12-03 PROCEDURE — 85610 PROTHROMBIN TIME: CPT

## 2021-12-03 PROCEDURE — 85730 THROMBOPLASTIN TIME PARTIAL: CPT

## 2021-12-03 RX ORDER — ACETAMINOPHEN 500 MG
1000 TABLET ORAL ONCE
Refills: 0 | Status: COMPLETED | OUTPATIENT
Start: 2021-12-03 | End: 2021-12-03

## 2021-12-03 RX ADMIN — Medication 1000 MILLIGRAM(S): at 18:13

## 2021-12-03 NOTE — ED PROVIDER NOTE - CONSTITUTIONAL, MLM
normal... Well appearing, awake, alert, oriented to person, place, time/situation and in no apparent distress. AAOx4. NAD. Nontoxic appearing.

## 2021-12-03 NOTE — ED PROVIDER NOTE - NS ED ROS FT
GENERAL: no fever, no chills  EYES: no change in vision, no irritation, no discharge, no redness, no pain  HEENT: no trouble swallowing or speaking, no throat pain, no ear pain  CARDIAC: +chest pain, no palpitations   PULMONARY: no cough, +shortness of breath, no wheezing  GI: no abdominal pain, no nausea, no vomiting, no diarrhea, no constipation  : no changes in urination, no dysuria, no frequency, no hematuria, no discharge  SKIN: no rashes  NEURO: no headache, no numbness, no weakness  MSK: no joint pain, no muscle pain, +back pain, no calf pain

## 2021-12-03 NOTE — ED PROVIDER NOTE - PROGRESS NOTE DETAILS
Antony Bolivar, PGY3: CTA negative for acute findings. Repeat trop negative. Pain improved at this time. Will call group home to discuss return precautions. Discussed return precautions and all questions answered. Pt in agreement w/ plan. CAOx3, NAD, VSS. Stable for d/c. Antony Bolivar, PGY3: D-dimer negative. CTA ordered given open heart surgery to r/o dissection given chest and back pain. Patient aware of plan. Repeat trop ordered. Antony Bolivar, PGY3: Spoke to Jannie from [facility], states 30 minutes till . Dylan Loja for attending Dr. Epperson: Updated  pt with labs and imaging results. Dylan Loja for attending Dr. Epperson: Updated  pt with labs and imaging results. Patient provided with results of all labs and imaging. CT of dissection includes CT of the abdomen. Patient informed of the inherent deficiencies with CT and that if symptoms persist she would need further work up or even an MRI. Strict return precautions provided.

## 2021-12-03 NOTE — ED PROVIDER NOTE - PATIENT PORTAL LINK FT
You can access the FollowMyHealth Patient Portal offered by [facility] by registering at the following website: [email or web address]. By joining [employer]’s FollowMyHealth portal, you will also be able to view your health information using other applications (apps) compatible with our system.

## 2021-12-03 NOTE — ED PROVIDER NOTE - ATTENDING CONTRIBUTION TO CARE
I Shola Epperson MD saw and examined the patient. Resident physician saw and examined the patient under my supervision and I was involved in key steps in care of this patient. I discussed the care of the patient with resident and agree with resident's plan, assessment and care of the patient while in the ED.

## 2021-12-03 NOTE — ED PROVIDER NOTE - PHYSICAL EXAMINATION
GENERAL: A&Ox3, non-toxic appearing, no acute distress  HEENT: NCAT, EOMI, oral mucosa moist, normal conjunctiva  RESP: CTAB, no respiratory distress, no wheezes/rhonchi/rales, speaking in full sentences  CV: RRR, no murmurs/rubs/gallops, no JVD, no peripheral edema  ABDOMEN: soft, non-tender, non-distended, no guarding, no CVA tenderness  MSK: no visible deformities, no chest wall tenderness, +paralumbar tenderness b/l  NEURO: no focal sensory or motor deficits, CN 2-12 grossly intact  SKIN: warm, normal color, well perfused, no rash  PSYCH: normal affect GENERAL: A&Ox3, non-toxic appearing, no acute distress  HEENT: NCAT, EOMI, oral mucosa moist, normal conjunctiva  RESP: CTAB, no respiratory distress, no wheezes/rhonchi/rales, speaking in full sentences  CV: RRR, no murmurs/rubs/gallops, no JVD, no peripheral edema  ABDOMEN: soft, non-tender, non-distended, no guarding, no CVA tenderness  MSK: no visible deformities, no chest wall tenderness  NEURO: no focal sensory or motor deficits, CN 2-12 grossly intact  SKIN: warm, normal color, well perfused, no rash  PSYCH: normal affect

## 2021-12-03 NOTE — ED PROVIDER NOTE - NSFOLLOWUPINSTRUCTIONS_ED_ALL_ED_FT
Chest Pain    Chest pain can be caused by many different conditions which may or may not be dangerous. Causes include heartburn, lung infections, heart attack, blood clot in lungs, skin infections, strain or damage to muscle, cartilage, or bones, etc. In addition to a history and physical examination, an electrocardiogram (ECG) or other lab tests may have been performed to determine the cause of your chest pain. Follow up with your primary care provider or with a cardiologist as instructed.     Take Tylenol 650mg (Two 325 mg pills) every 4-6 hours as needed for pain.     SEEK IMMEDIATE MEDICAL CARE IF YOU HAVE ANY OF THE FOLLOWING SYMPTOMS: worsening chest pain, coughing up blood, unexplained back/neck/jaw pain, severe abdominal pain, dizziness or lightheadedness, fainting, shortness of breath, sweaty or clammy skin, vomiting, or racing heart beat. These symptoms may represent a serious problem that is an emergency. Do not wait to see if the symptoms will go away. Get medical help right away. Call 911 and do not drive yourself to the hospital. Please follow up with your cardiologist, if you have any chest pain, shortness of breath or worsening of symptoms return to us immediately. Please return to us if you can not see your cardiologist or if any other health concerns. Please note that we have discussed the results of your labs and imaging with you and provided you with copies of all labs and imaging. We have also provided your residence with report and the need for follow up.   ________________  Chest Pain    Chest pain can be caused by many different conditions which may or may not be dangerous. Causes include heartburn, lung infections, heart attack, blood clot in lungs, skin infections, strain or damage to muscle, cartilage, or bones, etc. In addition to a history and physical examination, an electrocardiogram (ECG) or other lab tests may have been performed to determine the cause of your chest pain. Follow up with your primary care provider or with a cardiologist as instructed.     Take Tylenol 650mg (Two 325 mg pills) every 4-6 hours as needed for pain.     SEEK IMMEDIATE MEDICAL CARE IF YOU HAVE ANY OF THE FOLLOWING SYMPTOMS: worsening chest pain, coughing up blood, unexplained back/neck/jaw pain, severe abdominal pain, dizziness or lightheadedness, fainting, shortness of breath, sweaty or clammy skin, vomiting, or racing heart beat. These symptoms may represent a serious problem that is an emergency. Do not wait to see if the symptoms will go away. Get medical help right away. Call 911 and do not drive yourself to the hospital.

## 2021-12-03 NOTE — ED PROVIDER NOTE - OBJECTIVE STATEMENT
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 States onset Monday, woke her from sleep. Has been intermittent, no aggravating factors. States she is having lower rib pain, worse w/ inspiration. No hx of similar pain. Feels mildly short of breath. Also having lower back discomfort, denies dysuria or hematuria. Otherwise denies syncope, cough, abd pain, n/v, or diaphoresis. 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 States onset Monday, woke her from sleep. Has been intermittent, no aggravating factors. States she is having lower rib pain, worse w/ inspiration. No hx of similar pain. Feels mildly short of breath. Also having lower back discomfort, denies dysuria or hematuria. Otherwise denies syncope, cough, abd pain, n/v, or diaphoresis.    Cardiology- Dr. 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 States onset Monday, woke her from sleep. Has been intermittent, no aggravating factors. States she is having lower rib pain, worse w/ inspiration. No hx of similar pain. Feels mildly short of breath. Also having lower back discomfort, denies dysuria or hematuria. Otherwise denies syncope, cough, abd pain, n/v, or diaphoresis. No saddle anesthesia. No incontinence of urine or stool. No melena or hematochezia. No dysuria hematuria or frequency. No recent trauma. No visual or focal neurological complaints. No skin rash. No neck stiffness or pain.      Cardiology- Dr. Pantera VILLAVICENCIO

## 2021-12-03 NOTE — ED ADULT NURSE REASSESSMENT NOTE - NS ED NURSE REASSESS COMMENT FT1
Patient discharged at this time.  Staff from [facility] here at this time to  patient.  Patient walked to car.

## 2021-12-03 NOTE — ED PROVIDER NOTE - PLAN OF CARE
Trops neg x 2, nontoxic appearing, CT unremarkable for any acute pathology, provided patient with results including all incidental findings and reports provided in dc paper work, strict returen precautions, at the time of the DC, patient had no complaints, and was amicable to follow up outpatient with MD.

## 2021-12-03 NOTE — ED PROVIDER NOTE - CLINICAL SUMMARY MEDICAL DECISION MAKING FREE TEXT BOX
Antony Bolivar, PGY3: 21 year old female p/w chest pain x5 days, midsternal, a/w rib pain and mild shortness of breath. Exam benign. Given hx of RV thrombus, will obtain labs, d-dimer, cxr, pain control, reassess.

## 2021-12-03 NOTE — ED PROVIDER NOTE - NS_ ATTENDINGSCRIBEDETAILS _ED_A_ED_FT
I Shola Epperson MD saw and examined the patient. Scribe documented for me and under my supervision. I have modified the scribe's documentation where necessary to reflect my history, physical exam and other relevant documentations pertinent to the care of the patient.

## 2021-12-03 NOTE — ED PROVIDER NOTE - CARE PLAN
1 Principal Discharge DX:	Chest pain  Secondary Diagnosis:	Lower back pain   Principal Discharge DX:	Chest pain  Goal:	Trops neg x 2, nontoxic appearing, CT unremarkable for any acute pathology, provided patient with results including all incidental findings and reports provided in dc paper work, strict returen precautions, at the time of the DC, patient had no complaints, and was amicable to follow up outpatient with MD.  Secondary Diagnosis:	Lower back pain

## 2021-12-14 ENCOUNTER — EMERGENCY (EMERGENCY)
Facility: HOSPITAL | Age: 21
LOS: 0 days | Discharge: HOME | End: 2021-12-15
Attending: STUDENT IN AN ORGANIZED HEALTH CARE EDUCATION/TRAINING PROGRAM
Payer: COMMERCIAL

## 2021-12-14 VITALS
DIASTOLIC BLOOD PRESSURE: 74 MMHG | SYSTOLIC BLOOD PRESSURE: 128 MMHG | WEIGHT: 190.04 LBS | OXYGEN SATURATION: 100 % | TEMPERATURE: 98 F | RESPIRATION RATE: 18 BRPM | HEART RATE: 96 BPM | HEIGHT: 65 IN

## 2021-12-14 DIAGNOSIS — R42 DIZZINESS AND GIDDINESS: ICD-10-CM

## 2021-12-14 DIAGNOSIS — Z98.890 OTHER SPECIFIED POSTPROCEDURAL STATES: Chronic | ICD-10-CM

## 2021-12-14 DIAGNOSIS — E11.9 TYPE 2 DIABETES MELLITUS WITHOUT COMPLICATIONS: ICD-10-CM

## 2021-12-14 DIAGNOSIS — R11.0 NAUSEA: ICD-10-CM

## 2021-12-14 DIAGNOSIS — Z79.84 LONG TERM (CURRENT) USE OF ORAL HYPOGLYCEMIC DRUGS: ICD-10-CM

## 2021-12-14 DIAGNOSIS — R05.9 COUGH, UNSPECIFIED: ICD-10-CM

## 2021-12-14 LAB
ALBUMIN SERPL ELPH-MCNC: 3.4 G/DL — SIGNIFICANT CHANGE UP (ref 3.3–5)
ALP SERPL-CCNC: 100 U/L — SIGNIFICANT CHANGE UP (ref 40–120)
ALT FLD-CCNC: 24 U/L — SIGNIFICANT CHANGE UP (ref 12–78)
ANION GAP SERPL CALC-SCNC: 4 MMOL/L — LOW (ref 5–17)
APPEARANCE UR: CLEAR — SIGNIFICANT CHANGE UP
AST SERPL-CCNC: 13 U/L — LOW (ref 15–37)
BASOPHILS # BLD AUTO: 0.06 K/UL — SIGNIFICANT CHANGE UP (ref 0–0.2)
BASOPHILS NFR BLD AUTO: 0.5 % — SIGNIFICANT CHANGE UP (ref 0–2)
BILIRUB SERPL-MCNC: 0.2 MG/DL — SIGNIFICANT CHANGE UP (ref 0.2–1.2)
BILIRUB UR-MCNC: NEGATIVE — SIGNIFICANT CHANGE UP
BUN SERPL-MCNC: 14 MG/DL — SIGNIFICANT CHANGE UP (ref 7–23)
CALCIUM SERPL-MCNC: 9.2 MG/DL — SIGNIFICANT CHANGE UP (ref 8.5–10.1)
CHLORIDE SERPL-SCNC: 107 MMOL/L — SIGNIFICANT CHANGE UP (ref 96–108)
CO2 SERPL-SCNC: 28 MMOL/L — SIGNIFICANT CHANGE UP (ref 22–31)
COLOR SPEC: YELLOW — SIGNIFICANT CHANGE UP
CREAT SERPL-MCNC: 0.98 MG/DL — SIGNIFICANT CHANGE UP (ref 0.5–1.3)
DIFF PNL FLD: NEGATIVE — SIGNIFICANT CHANGE UP
EOSINOPHIL # BLD AUTO: 0.28 K/UL — SIGNIFICANT CHANGE UP (ref 0–0.5)
EOSINOPHIL NFR BLD AUTO: 2.5 % — SIGNIFICANT CHANGE UP (ref 0–6)
GLUCOSE SERPL-MCNC: 94 MG/DL — SIGNIFICANT CHANGE UP (ref 70–99)
GLUCOSE UR QL: NEGATIVE MG/DL — SIGNIFICANT CHANGE UP
HCT VFR BLD CALC: 38.9 % — SIGNIFICANT CHANGE UP (ref 34.5–45)
HGB BLD-MCNC: 11.6 G/DL — SIGNIFICANT CHANGE UP (ref 11.5–15.5)
IMM GRANULOCYTES NFR BLD AUTO: 0.3 % — SIGNIFICANT CHANGE UP (ref 0–1.5)
KETONES UR-MCNC: NEGATIVE — SIGNIFICANT CHANGE UP
LEUKOCYTE ESTERASE UR-ACNC: NEGATIVE — SIGNIFICANT CHANGE UP
LIDOCAIN IGE QN: 188 U/L — SIGNIFICANT CHANGE UP (ref 73–393)
LYMPHOCYTES # BLD AUTO: 2.81 K/UL — SIGNIFICANT CHANGE UP (ref 1–3.3)
LYMPHOCYTES # BLD AUTO: 25 % — SIGNIFICANT CHANGE UP (ref 13–44)
MAGNESIUM SERPL-MCNC: 2.2 MG/DL — SIGNIFICANT CHANGE UP (ref 1.6–2.6)
MCHC RBC-ENTMCNC: 24.4 PG — LOW (ref 27–34)
MCHC RBC-ENTMCNC: 29.8 GM/DL — LOW (ref 32–36)
MCV RBC AUTO: 81.7 FL — SIGNIFICANT CHANGE UP (ref 80–100)
MONOCYTES # BLD AUTO: 0.51 K/UL — SIGNIFICANT CHANGE UP (ref 0–0.9)
MONOCYTES NFR BLD AUTO: 4.5 % — SIGNIFICANT CHANGE UP (ref 2–14)
NEUTROPHILS # BLD AUTO: 7.53 K/UL — HIGH (ref 1.8–7.4)
NEUTROPHILS NFR BLD AUTO: 67.2 % — SIGNIFICANT CHANGE UP (ref 43–77)
NITRITE UR-MCNC: NEGATIVE — SIGNIFICANT CHANGE UP
PH UR: 5 — SIGNIFICANT CHANGE UP (ref 5–8)
PLATELET # BLD AUTO: 411 K/UL — HIGH (ref 150–400)
POTASSIUM SERPL-MCNC: 4.1 MMOL/L — SIGNIFICANT CHANGE UP (ref 3.5–5.3)
POTASSIUM SERPL-SCNC: 4.1 MMOL/L — SIGNIFICANT CHANGE UP (ref 3.5–5.3)
PROT SERPL-MCNC: 7.4 GM/DL — SIGNIFICANT CHANGE UP (ref 6–8.3)
PROT UR-MCNC: NEGATIVE MG/DL — SIGNIFICANT CHANGE UP
RBC # BLD: 4.76 M/UL — SIGNIFICANT CHANGE UP (ref 3.8–5.2)
RBC # FLD: 15 % — HIGH (ref 10.3–14.5)
SODIUM SERPL-SCNC: 139 MMOL/L — SIGNIFICANT CHANGE UP (ref 135–145)
SP GR SPEC: 1.02 — SIGNIFICANT CHANGE UP (ref 1.01–1.02)
TROPONIN I, HIGH SENSITIVITY RESULT: 7.18 NG/L — SIGNIFICANT CHANGE UP
UROBILINOGEN FLD QL: NEGATIVE MG/DL — SIGNIFICANT CHANGE UP
WBC # BLD: 11.22 K/UL — HIGH (ref 3.8–10.5)
WBC # FLD AUTO: 11.22 K/UL — HIGH (ref 3.8–10.5)

## 2021-12-14 PROCEDURE — 83735 ASSAY OF MAGNESIUM: CPT

## 2021-12-14 PROCEDURE — 36415 COLL VENOUS BLD VENIPUNCTURE: CPT

## 2021-12-14 PROCEDURE — 99285 EMERGENCY DEPT VISIT HI MDM: CPT

## 2021-12-14 PROCEDURE — 99284 EMERGENCY DEPT VISIT MOD MDM: CPT | Mod: 25

## 2021-12-14 PROCEDURE — 80053 COMPREHEN METABOLIC PANEL: CPT

## 2021-12-14 PROCEDURE — 81003 URINALYSIS AUTO W/O SCOPE: CPT

## 2021-12-14 PROCEDURE — 71045 X-RAY EXAM CHEST 1 VIEW: CPT

## 2021-12-14 PROCEDURE — 81025 URINE PREGNANCY TEST: CPT

## 2021-12-14 PROCEDURE — 83690 ASSAY OF LIPASE: CPT

## 2021-12-14 PROCEDURE — 71045 X-RAY EXAM CHEST 1 VIEW: CPT | Mod: 26

## 2021-12-14 PROCEDURE — 93010 ELECTROCARDIOGRAM REPORT: CPT

## 2021-12-14 PROCEDURE — 85025 COMPLETE CBC W/AUTO DIFF WBC: CPT

## 2021-12-14 PROCEDURE — 87086 URINE CULTURE/COLONY COUNT: CPT

## 2021-12-14 PROCEDURE — 84484 ASSAY OF TROPONIN QUANT: CPT

## 2021-12-14 PROCEDURE — 93005 ELECTROCARDIOGRAM TRACING: CPT

## 2021-12-14 RX ORDER — SODIUM CHLORIDE 9 MG/ML
1000 INJECTION INTRAMUSCULAR; INTRAVENOUS; SUBCUTANEOUS ONCE
Refills: 0 | Status: COMPLETED | OUTPATIENT
Start: 2021-12-14 | End: 2021-12-14

## 2021-12-14 RX ADMIN — SODIUM CHLORIDE 1000 MILLILITER(S): 9 INJECTION INTRAMUSCULAR; INTRAVENOUS; SUBCUTANEOUS at 21:18

## 2021-12-14 NOTE — ED PROVIDER NOTE - CLINICAL SUMMARY MEDICAL DECISION MAKING FREE TEXT BOX
21 F with multiple complaints including CP, nausea, dizziness, cough x multiple weeks in the set of chronicity. Plan: EKG, CXR, basic labs including troponin and reeval 21 F with multiple complaints including CP, nausea, dizziness, cough x multiple weeks in the setting of chronicity. Plan: EKG, CXR, basic labs including troponin and reeval

## 2021-12-14 NOTE — ED PROVIDER NOTE - PROGRESS NOTE DETAILS
Carina DO: patient feeling better at this time; pending ua; patient to be discharged home; instructed to f/u with pmd in 1-2 days without fail.

## 2021-12-14 NOTE — ED ADULT TRIAGE NOTE - CHIEF COMPLAINT QUOTE
Patient brought in by [facility] for cough, rib pain, dizziness and nausea. hx of "blood clot in right ventricle". patient wishes to speak to psych while here. patient prefers they them pronouns. denies SI/HI.

## 2021-12-14 NOTE — ED ADULT NURSE REASSESSMENT NOTE - NS ED NURSE REASSESS COMMENT FT1
patient resting in bed comfortably. discharged. iv removed. called group home to update that patient is discharged from hospital, awaiting group home aide to arrive to transport back. no complaints at this time. will continue to monitor.

## 2021-12-14 NOTE — ED PROVIDER NOTE - PATIENT PORTAL LINK FT
You can access the FollowMyHealth Patient Portal offered by [facility] by registering at the following website: [email or web address]. By joining [employer]’s FollowMyHealth portal, you will also be able to view your health information using other applications (apps) compatible with our system.

## 2021-12-14 NOTE — ED PROVIDER NOTE - OBJECTIVE STATEMENT
22 y/o F with a PMHx of borderline personality disorder, depression, JEREMY, autism, DM presents to the ED with multiple medical complaints, worsening. Pt states that she has been experiencing dizziness x2 weeks, nausea x1 week, cough and bilateral rib pain, and chest tightness.

## 2021-12-15 NOTE — ED ADULT NURSE NOTE - OBJECTIVE STATEMENT
presents to the ED with multiple medical complaints, worsening. Pt states that she has been experiencing dizziness x2 weeks, nausea x1 week, cough and bilateral rib pain, and chest tightness.

## 2021-12-15 NOTE — ED ADULT NURSE REASSESSMENT NOTE - NS ED NURSE REASSESS COMMENT FT1
[facility] staff arrived to  patient and transport home. patient alert and oriented x 4, respirations even and unlabored, ms strength 5/5 upper and lower ext bilaterally, ambulatory with steady gait. written and verbal discharge and followup instructions given to patient, verbalized back understanding. copy of lab reports given to patient. patient walked outside to [facility] vehicle, update given to staff members. discharged from ED at 0015.

## 2021-12-16 LAB
CULTURE RESULTS: SIGNIFICANT CHANGE UP
SPECIMEN SOURCE: SIGNIFICANT CHANGE UP

## 2022-01-23 ENCOUNTER — LAB REQUISITION (OUTPATIENT)
Dept: ADMINISTRATIVE | Age: 22
End: 2022-01-23
Payer: COMMERCIAL

## 2022-01-23 DIAGNOSIS — Z00.00 HEALTHCARE MAINTENANCE: ICD-10-CM

## 2022-01-23 PROCEDURE — 81001 URINALYSIS AUTO W/SCOPE: CPT | Performed by: OTHER

## 2022-01-24 LAB
B-HCG UR QL: NEGATIVE
BILIRUB UR QL: NEGATIVE
COLOR UR: YELLOW
GLUCOSE UR-MCNC: NEGATIVE MG/DL
HGB UR QL STRIP.AUTO: NEGATIVE
KETONES UR-MCNC: NEGATIVE MG/DL
NITRITE UR QL STRIP.AUTO: NEGATIVE
PH UR: 5 [PH] (ref 5–8)
PROT UR-MCNC: NEGATIVE MG/DL
SP GR UR STRIP: 1.02 (ref 1–1.03)
UROBILINOGEN UR STRIP-ACNC: <2

## 2022-01-24 PROCEDURE — 81025 URINE PREGNANCY TEST: CPT | Performed by: OTHER

## 2022-01-27 LAB
ALBUMIN SERPL-MCNC: 3.3 G/DL (ref 3.4–5)
ALBUMIN/GLOB SERPL: 0.9 {RATIO} (ref 1–2)
ALP LIVER SERPL-CCNC: 84 U/L
ALT SERPL-CCNC: 21 U/L
AMYLASE SERPL-CCNC: 68 U/L (ref 25–115)
ANION GAP SERPL CALC-SCNC: 1 MMOL/L (ref 0–18)
AST SERPL-CCNC: 15 U/L (ref 15–37)
BASOPHILS # BLD AUTO: 0.04 X10(3) UL (ref 0–0.2)
BASOPHILS NFR BLD AUTO: 0.5 %
BILIRUB SERPL-MCNC: 0.2 MG/DL (ref 0.1–2)
BUN BLD-MCNC: 11 MG/DL (ref 7–18)
BUN/CREAT SERPL: 11.7 (ref 10–20)
CALCIUM BLD-MCNC: 9.3 MG/DL (ref 8.5–10.1)
CHLORIDE SERPL-SCNC: 108 MMOL/L (ref 98–112)
CO2 SERPL-SCNC: 29 MMOL/L (ref 21–32)
CREAT BLD-MCNC: 0.94 MG/DL
DEPRECATED RDW RBC AUTO: 43.2 FL (ref 35.1–46.3)
EOSINOPHIL # BLD AUTO: 0.27 X10(3) UL (ref 0–0.7)
EOSINOPHIL NFR BLD AUTO: 3.1 %
ERYTHROCYTE [DISTWIDTH] IN BLOOD BY AUTOMATED COUNT: 14.9 % (ref 11–15)
FASTING STATUS PATIENT QL REPORTED: YES
GLOBULIN PLAS-MCNC: 3.5 G/DL (ref 2.8–4.4)
GLUCOSE BLD-MCNC: 81 MG/DL (ref 70–99)
HCT VFR BLD AUTO: 39.8 %
HGB BLD-MCNC: 11.5 G/DL
IMM GRANULOCYTES # BLD AUTO: 0.02 X10(3) UL (ref 0–1)
IMM GRANULOCYTES NFR BLD: 0.2 %
LITHIUM SERPL-SCNC: 0.8 MMOL/L (ref 0.6–1.2)
LYMPHOCYTES # BLD AUTO: 2.12 X10(3) UL (ref 1–4)
LYMPHOCYTES NFR BLD AUTO: 24.5 %
MAGNESIUM SERPL-MCNC: 2.3 MG/DL (ref 1.6–2.6)
MCH RBC QN AUTO: 23.4 PG (ref 26–34)
MCHC RBC AUTO-ENTMCNC: 28.9 G/DL (ref 31–37)
MCV RBC AUTO: 80.9 FL
MONOCYTES # BLD AUTO: 0.35 X10(3) UL (ref 0.1–1)
MONOCYTES NFR BLD AUTO: 4 %
NEUTROPHILS # BLD AUTO: 5.86 X10 (3) UL (ref 1.5–7.7)
NEUTROPHILS # BLD AUTO: 5.86 X10(3) UL (ref 1.5–7.7)
NEUTROPHILS NFR BLD AUTO: 67.7 %
OSMOLALITY SERPL CALC.SUM OF ELEC: 284 MOSM/KG (ref 275–295)
PHOSPHATE SERPL-MCNC: 3.2 MG/DL (ref 2.5–4.9)
PLATELET # BLD AUTO: 380 10(3)UL (ref 150–450)
POTASSIUM SERPL-SCNC: 4.7 MMOL/L (ref 3.5–5.1)
PROT SERPL-MCNC: 6.8 G/DL (ref 6.4–8.2)
RBC # BLD AUTO: 4.92 X10(6)UL
SODIUM SERPL-SCNC: 138 MMOL/L (ref 136–145)
TSI SER-ACNC: 3.14 MIU/ML (ref 0.36–3.74)
WBC # BLD AUTO: 8.7 X10(3) UL (ref 4–11)

## 2022-01-27 PROCEDURE — 80178 ASSAY OF LITHIUM: CPT | Performed by: OTHER

## 2022-01-27 PROCEDURE — 84443 ASSAY THYROID STIM HORMONE: CPT | Performed by: OTHER

## 2022-01-27 PROCEDURE — 85025 COMPLETE CBC W/AUTO DIFF WBC: CPT | Performed by: OTHER

## 2022-01-27 PROCEDURE — 80053 COMPREHEN METABOLIC PANEL: CPT | Performed by: OTHER

## 2022-01-27 PROCEDURE — 83735 ASSAY OF MAGNESIUM: CPT | Performed by: OTHER

## 2022-01-27 PROCEDURE — 36415 COLL VENOUS BLD VENIPUNCTURE: CPT | Performed by: OTHER

## 2022-01-27 PROCEDURE — 84100 ASSAY OF PHOSPHORUS: CPT | Performed by: OTHER

## 2022-01-27 PROCEDURE — 82150 ASSAY OF AMYLASE: CPT | Performed by: OTHER

## 2022-01-28 ENCOUNTER — LAB REQUISITION (OUTPATIENT)
Dept: ADMINISTRATIVE | Age: 22
End: 2022-01-28
Payer: COMMERCIAL

## 2022-01-29 ENCOUNTER — LAB REQUISITION (OUTPATIENT)
Dept: ADMINISTRATIVE | Age: 22
End: 2022-01-29
Payer: COMMERCIAL

## 2022-01-31 LAB
EST. AVERAGE GLUCOSE BLD GHB EST-MCNC: 108 MG/DL (ref 68–126)
HBA1C MFR BLD: 5.4 % (ref ?–5.7)

## 2022-01-31 PROCEDURE — 83036 HEMOGLOBIN GLYCOSYLATED A1C: CPT | Performed by: NURSE PRACTITIONER

## 2022-02-06 ENCOUNTER — LAB REQUISITION (OUTPATIENT)
Dept: ADMINISTRATIVE | Age: 22
End: 2022-02-06
Payer: COMMERCIAL

## 2022-02-07 LAB
ANION GAP SERPL CALC-SCNC: 4 MMOL/L (ref 0–18)
BUN BLD-MCNC: 14 MG/DL (ref 7–18)
BUN/CREAT SERPL: 13.2 (ref 10–20)
CALCIUM BLD-MCNC: 9.8 MG/DL (ref 8.5–10.1)
CHLORIDE SERPL-SCNC: 108 MMOL/L (ref 98–112)
CO2 SERPL-SCNC: 24 MMOL/L (ref 21–32)
CREAT BLD-MCNC: 1.06 MG/DL
FASTING STATUS PATIENT QL REPORTED: NO
GLUCOSE BLD-MCNC: 91 MG/DL (ref 70–99)
MAGNESIUM SERPL-MCNC: 2.1 MG/DL (ref 1.7–2.8)
OSMOLALITY SERPL CALC.SUM OF ELEC: 282 MOSM/KG (ref 275–295)
PHOSPHATE SERPL-MCNC: 3.3 MG/DL (ref 2.5–4.9)
POTASSIUM SERPL-SCNC: 4.1 MMOL/L (ref 3.5–5.1)
SODIUM SERPL-SCNC: 136 MMOL/L (ref 136–145)
TROPONIN I HIGH SENSITIVITY: 4 NG/L

## 2022-02-07 PROCEDURE — 80048 BASIC METABOLIC PNL TOTAL CA: CPT | Performed by: NURSE PRACTITIONER

## 2022-02-07 PROCEDURE — 84484 ASSAY OF TROPONIN QUANT: CPT | Performed by: NURSE PRACTITIONER

## 2022-02-07 PROCEDURE — 83735 ASSAY OF MAGNESIUM: CPT | Performed by: NURSE PRACTITIONER

## 2022-02-07 PROCEDURE — 84100 ASSAY OF PHOSPHORUS: CPT | Performed by: NURSE PRACTITIONER

## 2022-02-26 ENCOUNTER — INPATIENT (INPATIENT)
Facility: HOSPITAL | Age: 22
LOS: 5 days | Discharge: HOME | DRG: 885 | End: 2022-03-04
Attending: PSYCHIATRY & NEUROLOGY | Admitting: PSYCHIATRY & NEUROLOGY
Payer: COMMERCIAL

## 2022-02-26 VITALS
HEIGHT: 65 IN | DIASTOLIC BLOOD PRESSURE: 66 MMHG | RESPIRATION RATE: 17 BRPM | OXYGEN SATURATION: 96 % | SYSTOLIC BLOOD PRESSURE: 120 MMHG | TEMPERATURE: 98 F | HEART RATE: 91 BPM

## 2022-02-26 DIAGNOSIS — Z98.890 OTHER SPECIFIED POSTPROCEDURAL STATES: Chronic | ICD-10-CM

## 2022-02-26 DIAGNOSIS — F60.3 BORDERLINE PERSONALITY DISORDER: ICD-10-CM

## 2022-02-26 DIAGNOSIS — F33.2 MAJOR DEPRESSIVE DISORDER, RECURRENT SEVERE WITHOUT PSYCHOTIC FEATURES: ICD-10-CM

## 2022-02-26 LAB
AMPHET UR-MCNC: NEGATIVE — SIGNIFICANT CHANGE UP
ANION GAP SERPL CALC-SCNC: 6 MMOL/L — SIGNIFICANT CHANGE UP (ref 5–17)
APAP SERPL-MCNC: < 2 UG/ML (ref 10–30)
APTT BLD: 34.1 SEC — SIGNIFICANT CHANGE UP (ref 27.5–35.5)
BARBITURATES UR SCN-MCNC: NEGATIVE — SIGNIFICANT CHANGE UP
BASOPHILS # BLD AUTO: 0.06 K/UL — SIGNIFICANT CHANGE UP (ref 0–0.2)
BASOPHILS NFR BLD AUTO: 0.5 % — SIGNIFICANT CHANGE UP (ref 0–2)
BENZODIAZ UR-MCNC: NEGATIVE — SIGNIFICANT CHANGE UP
BUN SERPL-MCNC: 11 MG/DL — SIGNIFICANT CHANGE UP (ref 7–23)
CALCIUM SERPL-MCNC: 9.5 MG/DL — SIGNIFICANT CHANGE UP (ref 8.5–10.1)
CHLORIDE SERPL-SCNC: 109 MMOL/L — HIGH (ref 96–108)
CO2 SERPL-SCNC: 26 MMOL/L — SIGNIFICANT CHANGE UP (ref 22–31)
COCAINE METAB.OTHER UR-MCNC: NEGATIVE — SIGNIFICANT CHANGE UP
CREAT SERPL-MCNC: 1.3 MG/DL — SIGNIFICANT CHANGE UP (ref 0.5–1.3)
EOSINOPHIL # BLD AUTO: 0.34 K/UL — SIGNIFICANT CHANGE UP (ref 0–0.5)
EOSINOPHIL NFR BLD AUTO: 2.7 % — SIGNIFICANT CHANGE UP (ref 0–6)
ETHANOL SERPL-MCNC: <10 MG/DL — SIGNIFICANT CHANGE UP (ref 0–10)
GLUCOSE SERPL-MCNC: 85 MG/DL — SIGNIFICANT CHANGE UP (ref 70–99)
HCG SERPL-ACNC: <1 MIU/ML — SIGNIFICANT CHANGE UP
HCT VFR BLD CALC: 40.6 % — SIGNIFICANT CHANGE UP (ref 34.5–45)
HGB BLD-MCNC: 12.2 G/DL — SIGNIFICANT CHANGE UP (ref 11.5–15.5)
IMM GRANULOCYTES NFR BLD AUTO: 0.3 % — SIGNIFICANT CHANGE UP (ref 0–1.5)
INR BLD: 1.39 RATIO — HIGH (ref 0.88–1.16)
LITHIUM SERPL-MCNC: 0.6 MMOL/L — SIGNIFICANT CHANGE UP (ref 0.6–1.2)
LYMPHOCYTES # BLD AUTO: 17.1 % — SIGNIFICANT CHANGE UP (ref 13–44)
LYMPHOCYTES # BLD AUTO: 2.18 K/UL — SIGNIFICANT CHANGE UP (ref 1–3.3)
MCHC RBC-ENTMCNC: 24.3 PG — LOW (ref 27–34)
MCHC RBC-ENTMCNC: 30 GM/DL — LOW (ref 32–36)
MCV RBC AUTO: 80.7 FL — SIGNIFICANT CHANGE UP (ref 80–100)
METHADONE UR-MCNC: NEGATIVE — SIGNIFICANT CHANGE UP
MONOCYTES # BLD AUTO: 0.53 K/UL — SIGNIFICANT CHANGE UP (ref 0–0.9)
MONOCYTES NFR BLD AUTO: 4.2 % — SIGNIFICANT CHANGE UP (ref 2–14)
NEUTROPHILS # BLD AUTO: 9.58 K/UL — HIGH (ref 1.8–7.4)
NEUTROPHILS NFR BLD AUTO: 75.2 % — SIGNIFICANT CHANGE UP (ref 43–77)
OPIATES UR-MCNC: NEGATIVE — SIGNIFICANT CHANGE UP
PCP SPEC-MCNC: SIGNIFICANT CHANGE UP
PCP UR-MCNC: NEGATIVE — SIGNIFICANT CHANGE UP
PLATELET # BLD AUTO: 376 K/UL — SIGNIFICANT CHANGE UP (ref 150–400)
POTASSIUM SERPL-MCNC: 3.6 MMOL/L — SIGNIFICANT CHANGE UP (ref 3.5–5.3)
POTASSIUM SERPL-SCNC: 3.6 MMOL/L — SIGNIFICANT CHANGE UP (ref 3.5–5.3)
PROTHROM AB SERPL-ACNC: 16.2 SEC — HIGH (ref 10.5–13.4)
RBC # BLD: 5.03 M/UL — SIGNIFICANT CHANGE UP (ref 3.8–5.2)
RBC # FLD: 14.6 % — HIGH (ref 10.3–14.5)
SALICYLATES SERPL-MCNC: <1.7 MG/DL (ref 2.8–20)
SARS-COV-2 RNA SPEC QL NAA+PROBE: SIGNIFICANT CHANGE UP
SODIUM SERPL-SCNC: 141 MMOL/L — SIGNIFICANT CHANGE UP (ref 135–145)
THC UR QL: NEGATIVE — SIGNIFICANT CHANGE UP
WBC # BLD: 12.73 K/UL — HIGH (ref 3.8–10.5)
WBC # FLD AUTO: 12.73 K/UL — HIGH (ref 3.8–10.5)

## 2022-02-26 PROCEDURE — 93010 ELECTROCARDIOGRAM REPORT: CPT

## 2022-02-26 PROCEDURE — 99285 EMERGENCY DEPT VISIT HI MDM: CPT

## 2022-02-26 RX ADMIN — AZITHROMYCIN 1000 MILLIGRAM(S): 500 TABLET, FILM COATED ORAL at 00:00

## 2022-02-26 NOTE — ED ADULT NURSE NOTE - OBJECTIVE STATEMENT
Pt presents to the ED today stating that she has SI starting yesterday. Pt reports self harm urges. Pt is prescribed Ativan PRN and took 8 the night before, does not remember anything before or after. Denies alcohol or drug use. Nonsmoker.
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
## 2022-02-26 NOTE — ED ADULT NURSE NOTE - NSIMPLEMENTINTERV_GEN_ALL_ED
Implemented All Universal Safety Interventions:  [place] to call system. Call bell, personal items and telephone within reach. Instruct patient to call for assistance. Room bathroom lighting operational. Non-slip footwear when patient is off stretcher. Physically safe environment: no spills, clutter or unnecessary equipment. Stretcher in lowest position, wheels locked, appropriate side rails in place.
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
## 2022-02-26 NOTE — ED PROVIDER NOTE - NS ED ROS FT
Constitutional: No fever or chills  Eyes: No visual changes  HEENT: No throat pain  CV: No chest pain  Resp: No SOB no cough  GI: No abd pain, nausea or vomiting  : No dysuria  MSK: No musculoskeletal pain  Skin: No rash  Neuro: No headache Constitutional: No fever or chills  Eyes: No visual changes  HEENT: No throat pain  CV: No chest pain  Resp: No SOB no cough  GI: No abd pain, nausea or vomiting  : No dysuria  MSK: No musculoskeletal pain  Skin: No rash  Neuro: No headache  Psych: +SI
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
## 2022-02-26 NOTE — ED BEHAVIORAL HEALTH ASSESSMENT NOTE - RISK ASSESSMENT
High Acute Suicide Risk Risk factors: baseline mood disorder, previous suicide attempts, previous inpatient hospitalizations, autism spec disorder.  Acute risks: depressive episode, suicidal thoughts with plan and intent.  Protective factors: no known access to weapons, no history of violence, no substance use, no panic.  Imminent suicide risk is MODERATE at this time, plan to mitigate with inpatient level of care for safety and stabilization.

## 2022-02-26 NOTE — ED BEHAVIORAL HEALTH ASSESSMENT NOTE - PSYCHIATRIC ISSUES AND PLAN (INCLUDE STANDING AND PRN MEDICATION)
PRNS: haldol 5mg, ativan 2mg, diphenhydramine 50mg, PO/IM, Q6H for Agitation ; continue current regimen of lithium 750mg qhs, pristiq 25 mg po daily, melatonin 3 mg po QHS PRNS: haldol 5mg, ativan 2mg, diphenhydramine 50mg, PO/IM, Q6H for Agitation ; continue current regimen of lithium 750mg qhs, pristiq 25 mg po daily, prazosin 2 mg po QHS, melatonin 3 mg po QHS

## 2022-02-26 NOTE — ED PROVIDER NOTE - OBJECTIVE STATEMENT
20 y/o female with a PMHx of autism, BPD, depression, DM, JEREMY, h/o blood clot on Eliquis presents to the ED c/o suicidal thoughts x yesterday. Pt reports self harm urges. Pt is prescribed Ativan PRN and took 8 the night before, does not remember anything before or after. Denies alcohol or drug use. Nonsmoker. Denies cp, sob. LMP: 02/12/22.
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
## 2022-02-26 NOTE — ED ADULT NURSE REASSESSMENT NOTE - NS ED NURSE REASSESS COMMENT FT1
Care assumed from MAURICE العراقي, pt. assessed at bedside. room checked for safety. pt. alert calm and cooperative. in yellow gown. belongings secured. pt. wanded. pt. prefers female sitter. charge RN made aware.  no pending orders. to be evaluated by tele psych

## 2022-02-26 NOTE — ED PROVIDER NOTE - PROGRESS NOTE DETAILS
ROSSG: Received signout from Dr. Bolton to follow up SANE exam nursing recommendations.  Plan B dose requested as well as STD and HIV prophylaxis meds.  Meds ordered.  Truvada and Isentress to be continued during voluntary admission. Per pharmacy Plan B not available so pharmacy ordered alternative Manuela. Patient refused to take Manuela per nurse. Telepsych has bed available on 5N for voluntary admission.  Paperwork to be redone and faxed as it was filled out earlier for voluntary admission to [facility]. ROSSG: Received signout from Dr. Bolton to follow up SANE exam nursing recommendations.  Plan B dose requested as well as STD and HIV prophylaxis meds.  Meds ordered.  Truvada and Isentress to be continued during voluntary admission.  Truvada 300mg is to be give once daily for a total of 28 days.  Isentress is prescribed 400mg twice daily for 28 days.

## 2022-02-26 NOTE — ED BEHAVIORAL HEALTH ASSESSMENT NOTE - DESCRIPTION
see hpi calm, has not required prn meds    Vital Signs Last 24 Hrs  T(C): 36.9 (26 Feb 2022 17:03), Max: 36.9 (26 Feb 2022 17:03)  T(F): 98.4 (26 Feb 2022 17:03), Max: 98.4 (26 Feb 2022 17:03)  HR: 91 (26 Feb 2022 17:03) (91 - 91)  BP: 120/66 (26 Feb 2022 17:03) (120/66 - 120/66)  BP(mean): 78 (26 Feb 2022 17:03) (78 - 78)  RR: 17 (26 Feb 2022 17:03) (17 - 17)  SpO2: 96% (26 Feb 2022 17:03) (96% - 96%)    COVID Exposure Screen- Patient  1. *Have you had a COVID-19 test in the last 90 days? ( ) Yes (x  ) No (  ) Unknown- Reason: _____  3. *Have you tested positive for COVID-19 antibodies? ( ) Yes ( x ) No (  ) Unknown- Reason: _____  5. *Have you received 2 doses of the COVID-19 vaccine? (x ) Yes ( ) No ( ) Unknown- Reason: _____  7. *In the past 10 days, have you been around anyone with a positive COVID-19 test?* ( ) Yes (  x) No ( ) Unknown- Reason: ____  13. *Have you been out of [place] within the past 10 days?* ( ) Yes (x ) No (  ) Unknown- Reason: _____ lives at [facility] group home, unemployed

## 2022-02-26 NOTE — ED BEHAVIORAL HEALTH ASSESSMENT NOTE - CURRENT MEDICATION
eliquis 5mg BID, Lithium 750 mg po QHS, Pristiq 25 mg po daily, metformin 1000mg BID, Prazosin 2 mg po QHS, Melatonin 3 mg po QHS

## 2022-02-26 NOTE — ED BEHAVIORAL HEALTH ASSESSMENT NOTE - DETAILS
[facility] aware see hpi pending bed informed medical ED attending Dr. Bolton handoff given to MAURICE Yadav

## 2022-02-27 DIAGNOSIS — R45.851 SUICIDAL IDEATIONS: ICD-10-CM

## 2022-02-27 DIAGNOSIS — F43.0 ACUTE STRESS REACTION: ICD-10-CM

## 2022-02-27 LAB — HIV 1+2 AB+HIV1 P24 AG SERPL QL IA: SIGNIFICANT CHANGE UP

## 2022-02-27 PROCEDURE — 83036 HEMOGLOBIN GLYCOSYLATED A1C: CPT

## 2022-02-27 PROCEDURE — 84443 ASSAY THYROID STIM HORMONE: CPT

## 2022-02-27 PROCEDURE — 84484 ASSAY OF TROPONIN QUANT: CPT

## 2022-02-27 PROCEDURE — 80178 ASSAY OF LITHIUM: CPT

## 2022-02-27 PROCEDURE — 99222 1ST HOSP IP/OBS MODERATE 55: CPT

## 2022-02-27 PROCEDURE — 93005 ELECTROCARDIOGRAM TRACING: CPT

## 2022-02-27 PROCEDURE — 82962 GLUCOSE BLOOD TEST: CPT

## 2022-02-27 PROCEDURE — 80061 LIPID PANEL: CPT

## 2022-02-27 PROCEDURE — 93010 ELECTROCARDIOGRAM REPORT: CPT

## 2022-02-27 PROCEDURE — 80053 COMPREHEN METABOLIC PANEL: CPT

## 2022-02-27 PROCEDURE — 85025 COMPLETE CBC W/AUTO DIFF WBC: CPT

## 2022-02-27 PROCEDURE — 99231 SBSQ HOSP IP/OBS SF/LOW 25: CPT

## 2022-02-27 PROCEDURE — 36415 COLL VENOUS BLD VENIPUNCTURE: CPT

## 2022-02-27 PROCEDURE — 93306 TTE W/DOPPLER COMPLETE: CPT

## 2022-02-27 RX ORDER — PRAZOSIN HCL 2 MG
2 CAPSULE ORAL AT BEDTIME
Refills: 0 | Status: DISCONTINUED | OUTPATIENT
Start: 2022-02-27 | End: 2022-03-01

## 2022-02-27 RX ORDER — AZITHROMYCIN 500 MG/1
1000 TABLET, FILM COATED ORAL ONCE
Refills: 0 | Status: COMPLETED | OUTPATIENT
Start: 2022-02-27 | End: 2022-02-26

## 2022-02-27 RX ORDER — RALTEGRAVIR 400 MG/1
400 TABLET, FILM COATED ORAL ONCE
Refills: 0 | Status: COMPLETED | OUTPATIENT
Start: 2022-02-27 | End: 2022-02-27

## 2022-02-27 RX ORDER — METFORMIN HYDROCHLORIDE 850 MG/1
1000 TABLET ORAL
Refills: 0 | Status: DISCONTINUED | OUTPATIENT
Start: 2022-02-27 | End: 2022-03-04

## 2022-02-27 RX ORDER — DIPHENHYDRAMINE HCL 50 MG
50 CAPSULE ORAL EVERY 6 HOURS
Refills: 0 | Status: DISCONTINUED | OUTPATIENT
Start: 2022-02-27 | End: 2022-03-04

## 2022-02-27 RX ORDER — LITHIUM CARBONATE 300 MG/1
750 TABLET, EXTENDED RELEASE ORAL AT BEDTIME
Refills: 0 | Status: DISCONTINUED | OUTPATIENT
Start: 2022-02-27 | End: 2022-02-27

## 2022-02-27 RX ORDER — HALOPERIDOL DECANOATE 100 MG/ML
5 INJECTION INTRAMUSCULAR ONCE
Refills: 0 | Status: COMPLETED | OUTPATIENT
Start: 2022-02-27 | End: 2022-02-27

## 2022-02-27 RX ORDER — HALOPERIDOL DECANOATE 100 MG/ML
50 INJECTION INTRAMUSCULAR ONCE
Refills: 0 | Status: DISCONTINUED | OUTPATIENT
Start: 2022-02-27 | End: 2022-02-27

## 2022-02-27 RX ORDER — EMTRICITABINE AND TENOFOVIR DISOPROXIL FUMARATE 200; 300 MG/1; MG/1
1 TABLET, FILM COATED ORAL ONCE
Refills: 0 | Status: COMPLETED | OUTPATIENT
Start: 2022-02-27 | End: 2022-02-27

## 2022-02-27 RX ORDER — CEFTRIAXONE 500 MG/1
500 INJECTION, POWDER, FOR SOLUTION INTRAMUSCULAR; INTRAVENOUS ONCE
Refills: 0 | Status: COMPLETED | OUTPATIENT
Start: 2022-02-27 | End: 2022-02-27

## 2022-02-27 RX ORDER — APIXABAN 2.5 MG/1
5 TABLET, FILM COATED ORAL EVERY 12 HOURS
Refills: 0 | Status: DISCONTINUED | OUTPATIENT
Start: 2022-02-27 | End: 2022-03-04

## 2022-02-27 RX ORDER — LEVONORGESTREL 1.5 MG/1
1.5 TABLET ORAL ONCE
Refills: 0 | Status: DISCONTINUED | OUTPATIENT
Start: 2022-02-27 | End: 2022-02-27

## 2022-02-27 RX ORDER — LITHIUM CARBONATE 300 MG/1
900 TABLET, EXTENDED RELEASE ORAL AT BEDTIME
Refills: 0 | Status: DISCONTINUED | OUTPATIENT
Start: 2022-02-27 | End: 2022-03-04

## 2022-02-27 RX ORDER — ONDANSETRON 8 MG/1
4 TABLET, FILM COATED ORAL ONCE
Refills: 0 | Status: COMPLETED | OUTPATIENT
Start: 2022-02-27 | End: 2022-02-27

## 2022-02-27 RX ORDER — METRONIDAZOLE 500 MG
2000 TABLET ORAL ONCE
Refills: 0 | Status: COMPLETED | OUTPATIENT
Start: 2022-02-27 | End: 2022-02-27

## 2022-02-27 RX ORDER — DIPHENHYDRAMINE HCL 50 MG
50 CAPSULE ORAL ONCE
Refills: 0 | Status: COMPLETED | OUTPATIENT
Start: 2022-02-27 | End: 2022-02-27

## 2022-02-27 RX ORDER — LANOLIN ALCOHOL/MO/W.PET/CERES
3 CREAM (GRAM) TOPICAL AT BEDTIME
Refills: 0 | Status: DISCONTINUED | OUTPATIENT
Start: 2022-02-27 | End: 2022-03-04

## 2022-02-27 RX ORDER — ULIPRISTAL ACETATE 30 MG/1
30 TABLET ORAL ONCE
Refills: 0 | Status: COMPLETED | OUTPATIENT
Start: 2022-02-27 | End: 2022-02-27

## 2022-02-27 RX ADMIN — Medication 50 MILLIGRAM(S): at 21:39

## 2022-02-27 RX ADMIN — EMTRICITABINE AND TENOFOVIR DISOPROXIL FUMARATE 1 TABLET(S): 200; 300 TABLET, FILM COATED ORAL at 04:13

## 2022-02-27 RX ADMIN — Medication 2 MILLIGRAM(S): at 21:39

## 2022-02-27 RX ADMIN — Medication 50 MILLIGRAM(S): at 12:20

## 2022-02-27 RX ADMIN — ONDANSETRON 4 MILLIGRAM(S): 8 TABLET, FILM COATED ORAL at 00:01

## 2022-02-27 RX ADMIN — HALOPERIDOL DECANOATE 5 MILLIGRAM(S): 100 INJECTION INTRAMUSCULAR at 21:38

## 2022-02-27 RX ADMIN — Medication 2000 MILLIGRAM(S): at 00:01

## 2022-02-27 RX ADMIN — RALTEGRAVIR 400 MILLIGRAM(S): 400 TABLET, FILM COATED ORAL at 04:14

## 2022-02-27 RX ADMIN — CEFTRIAXONE 500 MILLIGRAM(S): 500 INJECTION, POWDER, FOR SOLUTION INTRAMUSCULAR; INTRAVENOUS at 00:01

## 2022-02-27 RX ADMIN — Medication 2 MILLIGRAM(S): at 10:30
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
## 2022-02-27 NOTE — PATIENT PROFILE BEHAVIORAL HEALTH - LIVES WITH, PROFILE
Usually lives in [facility] group home, but has been living with her parents for the past 3 weeks./parents

## 2022-02-27 NOTE — ED ADULT NURSE REASSESSMENT NOTE - NS ED NURSE REASSESS COMMENT FT1
pt. refused maalox and Manuela medications. MD Alfredo made aware. pt. educated on medications purposes and verbalized understanding.
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
## 2022-02-27 NOTE — PROGRESS NOTE BEHAVIORAL HEALTH - NSBHADDHXPSYCHFT_PSY_A_CORE
pt h/o multiple inpatient hospitalizations for similar clinical presentation of affective dysregulation and dangerous impulsivity with thought disorganization  at times with reported AH   Last inpatient admission to [facility] in 11/21.   Pt in outpatient treatment with Dr. Carlos Hale in [place] and with therapist Rowena Mancilla

## 2022-02-27 NOTE — ED ADULT NURSE REASSESSMENT NOTE - NS ED NURSE REASSESS COMMENT FT1
Pt resting comfortably in bed with no acute distress noted. Pt on constant observation, updated on their status, the current plan of care, and available results no needs or requests at this time.  pt. c/o chest pain MD Mariee made aware, EKG completed medications to be given.

## 2022-02-27 NOTE — PROGRESS NOTE BEHAVIORAL HEALTH - NSBHADDHXFAMFT_PSY_A_CORE
Pt was reportedly adopted at age 1 week. Pt's biological mother with a h/o borderline personality disorder and substance use d/o. The pt's biological sister with a reported h/o bipolar disorder and substance use d/o

## 2022-02-27 NOTE — PROGRESS NOTE BEHAVIORAL HEALTH - NSBHADDHXPSYSOCFT_PSY_A_CORE
The pt had been residing at an [facility] supportive group home until recently. The pt has reportedly moved back in with her adoptive  parents x the past 3 weeks prior to admission to [facility] on 2/27/2022.
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
## 2022-02-27 NOTE — PROGRESS NOTE BEHAVIORAL HEALTH - NSBHFUPSTRENGTHS_PSY_A_CORE
Has supportive interpersonal relationships with family, friends or peers/Has access to housing/residential stability/Intelligent/Creative

## 2022-02-27 NOTE — PROGRESS NOTE BEHAVIORAL HEALTH - AXIS III
see hpi s/p open heart surgery  with h/o blot clot ( pt on Anticoagulant  Eliquis)   pre DM  obesity

## 2022-02-27 NOTE — PATIENT PROFILE BEHAVIORAL HEALTH - FALL HARM RISK - UNIVERSAL INTERVENTIONS
Bed in lowest position, wheels locked, appropriate side rails in place/Call bell, personal items and telephone in reach/Instruct patient to call for assistance before getting out of bed or chair/Non-slip footwear when patient is out of bed/[place] to call system/Physically safe environment - no spills, clutter or unnecessary equipment/Purposeful Proactive Rounding/Room/bathroom lighting operational, light cord in reach

## 2022-02-28 DIAGNOSIS — F90.9 ATTENTION-DEFICIT HYPERACTIVITY DISORDER, UNSPECIFIED TYPE: ICD-10-CM

## 2022-02-28 LAB
A1C WITH ESTIMATED AVERAGE GLUCOSE RESULT: 5.2 % — SIGNIFICANT CHANGE UP (ref 4–5.6)
ALBUMIN SERPL ELPH-MCNC: 3.1 G/DL — LOW (ref 3.3–5)
ALP SERPL-CCNC: 78 U/L — SIGNIFICANT CHANGE UP (ref 40–120)
ALT FLD-CCNC: 39 U/L — SIGNIFICANT CHANGE UP (ref 12–78)
ANION GAP SERPL CALC-SCNC: 2 MMOL/L — LOW (ref 5–17)
AST SERPL-CCNC: 45 U/L — HIGH (ref 15–37)
BASOPHILS # BLD AUTO: 0.03 K/UL — SIGNIFICANT CHANGE UP (ref 0–0.2)
BASOPHILS NFR BLD AUTO: 0.5 % — SIGNIFICANT CHANGE UP (ref 0–2)
BILIRUB SERPL-MCNC: 0.2 MG/DL — SIGNIFICANT CHANGE UP (ref 0.2–1.2)
BUN SERPL-MCNC: 9 MG/DL — SIGNIFICANT CHANGE UP (ref 7–23)
CALCIUM SERPL-MCNC: 8.9 MG/DL — SIGNIFICANT CHANGE UP (ref 8.5–10.1)
CHLORIDE SERPL-SCNC: 109 MMOL/L — HIGH (ref 96–108)
CHOLEST SERPL-MCNC: 129 MG/DL — SIGNIFICANT CHANGE UP
CO2 SERPL-SCNC: 28 MMOL/L — SIGNIFICANT CHANGE UP (ref 22–31)
CREAT SERPL-MCNC: 1.06 MG/DL — SIGNIFICANT CHANGE UP (ref 0.5–1.3)
EGFR: 76 ML/MIN/1.73M2 — SIGNIFICANT CHANGE UP
EOSINOPHIL # BLD AUTO: 0.33 K/UL — SIGNIFICANT CHANGE UP (ref 0–0.5)
EOSINOPHIL NFR BLD AUTO: 5 % — SIGNIFICANT CHANGE UP (ref 0–6)
ESTIMATED AVERAGE GLUCOSE: 103 MG/DL — SIGNIFICANT CHANGE UP (ref 68–114)
GLUCOSE SERPL-MCNC: 90 MG/DL — SIGNIFICANT CHANGE UP (ref 70–99)
HCT VFR BLD CALC: 40.5 % — SIGNIFICANT CHANGE UP (ref 34.5–45)
HDLC SERPL-MCNC: 35 MG/DL — LOW
HGB BLD-MCNC: 12.2 G/DL — SIGNIFICANT CHANGE UP (ref 11.5–15.5)
IMM GRANULOCYTES NFR BLD AUTO: 0.2 % — SIGNIFICANT CHANGE UP (ref 0–1.5)
LIPID PNL WITH DIRECT LDL SERPL: 63 MG/DL — SIGNIFICANT CHANGE UP
LITHIUM SERPL-MCNC: 0.2 MMOL/L — LOW (ref 0.6–1.2)
LYMPHOCYTES # BLD AUTO: 1.23 K/UL — SIGNIFICANT CHANGE UP (ref 1–3.3)
LYMPHOCYTES # BLD AUTO: 18.6 % — SIGNIFICANT CHANGE UP (ref 13–44)
MCHC RBC-ENTMCNC: 24.5 PG — LOW (ref 27–34)
MCHC RBC-ENTMCNC: 30.1 GM/DL — LOW (ref 32–36)
MCV RBC AUTO: 81.5 FL — SIGNIFICANT CHANGE UP (ref 80–100)
MONOCYTES # BLD AUTO: 0.32 K/UL — SIGNIFICANT CHANGE UP (ref 0–0.9)
MONOCYTES NFR BLD AUTO: 4.8 % — SIGNIFICANT CHANGE UP (ref 2–14)
NEUTROPHILS # BLD AUTO: 4.71 K/UL — SIGNIFICANT CHANGE UP (ref 1.8–7.4)
NEUTROPHILS NFR BLD AUTO: 70.9 % — SIGNIFICANT CHANGE UP (ref 43–77)
NON HDL CHOLESTEROL: 94 MG/DL — SIGNIFICANT CHANGE UP
PLATELET # BLD AUTO: 323 K/UL — SIGNIFICANT CHANGE UP (ref 150–400)
POTASSIUM SERPL-MCNC: 3.8 MMOL/L — SIGNIFICANT CHANGE UP (ref 3.5–5.3)
POTASSIUM SERPL-SCNC: 3.8 MMOL/L — SIGNIFICANT CHANGE UP (ref 3.5–5.3)
PROT SERPL-MCNC: 6.8 GM/DL — SIGNIFICANT CHANGE UP (ref 6–8.3)
RBC # BLD: 4.97 M/UL — SIGNIFICANT CHANGE UP (ref 3.8–5.2)
RBC # FLD: 15 % — HIGH (ref 10.3–14.5)
SODIUM SERPL-SCNC: 139 MMOL/L — SIGNIFICANT CHANGE UP (ref 135–145)
TRIGL SERPL-MCNC: 158 MG/DL — HIGH
TSH SERPL-MCNC: 3.83 UU/ML — SIGNIFICANT CHANGE UP (ref 0.34–4.82)
WBC # BLD: 6.63 K/UL — SIGNIFICANT CHANGE UP (ref 3.8–10.5)
WBC # FLD AUTO: 6.63 K/UL — SIGNIFICANT CHANGE UP (ref 3.8–10.5)

## 2022-02-28 PROCEDURE — 99231 SBSQ HOSP IP/OBS SF/LOW 25: CPT

## 2022-02-28 PROCEDURE — 93010 ELECTROCARDIOGRAM REPORT: CPT

## 2022-02-28 PROCEDURE — 12345: CPT | Mod: NC

## 2022-02-28 RX ORDER — RALTEGRAVIR 400 MG/1
400 TABLET, FILM COATED ORAL
Refills: 0 | Status: DISCONTINUED | OUTPATIENT
Start: 2022-02-28 | End: 2022-03-04

## 2022-02-28 RX ORDER — EMTRICITABINE AND TENOFOVIR DISOPROXIL FUMARATE 200; 300 MG/1; MG/1
1 TABLET, FILM COATED ORAL DAILY
Refills: 0 | Status: DISCONTINUED | OUTPATIENT
Start: 2022-02-28 | End: 2022-03-04

## 2022-02-28 RX ADMIN — METFORMIN HYDROCHLORIDE 1000 MILLIGRAM(S): 850 TABLET ORAL at 21:17

## 2022-02-28 RX ADMIN — RALTEGRAVIR 400 MILLIGRAM(S): 400 TABLET, FILM COATED ORAL at 21:18

## 2022-02-28 RX ADMIN — LITHIUM CARBONATE 900 MILLIGRAM(S): 300 TABLET, EXTENDED RELEASE ORAL at 21:17

## 2022-02-28 RX ADMIN — RALTEGRAVIR 400 MILLIGRAM(S): 400 TABLET, FILM COATED ORAL at 12:16

## 2022-02-28 RX ADMIN — APIXABAN 5 MILLIGRAM(S): 2.5 TABLET, FILM COATED ORAL at 12:15

## 2022-02-28 RX ADMIN — EMTRICITABINE AND TENOFOVIR DISOPROXIL FUMARATE 1 TABLET(S): 200; 300 TABLET, FILM COATED ORAL at 12:16

## 2022-02-28 RX ADMIN — Medication 2 MILLIGRAM(S): at 21:17

## 2022-02-28 RX ADMIN — Medication 3 MILLIGRAM(S): at 21:18

## 2022-02-28 RX ADMIN — APIXABAN 5 MILLIGRAM(S): 2.5 TABLET, FILM COATED ORAL at 21:17

## 2022-02-28 RX ADMIN — METFORMIN HYDROCHLORIDE 1000 MILLIGRAM(S): 850 TABLET ORAL at 12:15
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
## 2022-02-28 NOTE — CHART NOTE - NSCHARTNOTEFT_GEN_A_CORE
21 YOWF c/o light headed feeling  and 3 episodes of syncope over the last month. Last episode was 4 dyas ago . She admits to poor nutrition and fluid intact related to Anorexia Nervosa.   Currently, she has began drinking fluids and is hydrating herself. She was seen earlier and yesterday for Tachycardia 100-119 but flow sheet shows she was in the 70s today,  No chest pain or SOB at this time. There is a history of Rt ventricular embolectomy at [place] in Dec, 2020. she is currently on Eliquis.    Vital Signs Last 24 Hrs  T(C): 36.4 (28 Feb 2022 07:30), Max: 36.4 (28 Feb 2022 07:30)  T(F): 97.5 (28 Feb 2022 07:30), Max: 97.5 (28 Feb 2022 07:30)  HR: 113 (28 Feb 2022 19:45) (113 - 113)  BP: 96/79 (28 Feb 2022 19:45) (96/79 - 96/79)  BP(mean): --  RR: 16 (28 Feb 2022 19:45) (16 - 18)  SpO2: 100% (28 Feb 2022 19:45) (99% - 100%)    Lungs are clear to auscultation  Heart  regular Rhythm and Rate of 78/min regular   EKG RSR 72/min T wave inversion in v2 v3 Possible LAE     Plan   Cardiology consult 21 YOWF c/o light headed feeling  and 3 episodes of syncope over the last month. Last episode was 4 dyas ago . She admits to poor nutrition and fluid intact related to Anorexia Nervosa.   Currently, she has began drinking fluids and is hydrating herself. She was seen earlier and yesterday for Tachycardia 100-119 but flow sheet shows she was in the 70s today,  No chest pain or SOB at this time. There is a history of Rt ventricular embolectomy at [place] in Dec, 2020. she is currently on Eliquis.    Vital Signs Last 24 Hrs  T(C): 36.4 (28 Feb 2022 07:30), Max: 36.4 (28 Feb 2022 07:30)  T(F): 97.5 (28 Feb 2022 07:30), Max: 97.5 (28 Feb 2022 07:30)  HR: 113 (28 Feb 2022 19:45) (113 - 113)  BP: 96/79 (28 Feb 2022 19:45) (96/79 - 96/79)  BP(mean): --  RR: 16 (28 Feb 2022 19:45) (16 - 18)  SpO2: 100% (28 Feb 2022 19:45) (99% - 100%)    Lungs are clear to auscultation  Heart  regular Rhythm and Rate of 78/min regular   EKG RSR 72/min T wave inversion in v2 v3 Possible LAE     Plan   Cardiology consult ordered           Dr. Cho             Called to service 9:54 pm
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
## 2022-02-28 NOTE — PROGRESS NOTE BEHAVIORAL HEALTH - NSBHFUPINTERVALCCFT_PSY_A_CORE
Pt seen briefly in the corridor speaking with a male peer after which the pt again retreated to his room and remained in bed asleep /resting

## 2022-03-01 DIAGNOSIS — F84.0 AUTISTIC DISORDER: ICD-10-CM

## 2022-03-01 DIAGNOSIS — I26.99 OTHER PULMONARY EMBOLISM WITHOUT ACUTE COR PULMONALE: ICD-10-CM

## 2022-03-01 DIAGNOSIS — R94.31 ABNORMAL ELECTROCARDIOGRAM [ECG] [EKG]: ICD-10-CM

## 2022-03-01 DIAGNOSIS — R55 SYNCOPE AND COLLAPSE: ICD-10-CM

## 2022-03-01 LAB — TROPONIN I, HIGH SENSITIVITY RESULT: 3.38 NG/L — SIGNIFICANT CHANGE UP

## 2022-03-01 PROCEDURE — 99231 SBSQ HOSP IP/OBS SF/LOW 25: CPT

## 2022-03-01 PROCEDURE — 93306 TTE W/DOPPLER COMPLETE: CPT | Mod: 26

## 2022-03-01 PROCEDURE — 99223 1ST HOSP IP/OBS HIGH 75: CPT

## 2022-03-01 RX ORDER — SERTRALINE 25 MG/1
25 TABLET, FILM COATED ORAL AT BEDTIME
Refills: 0 | Status: DISCONTINUED | OUTPATIENT
Start: 2022-03-01 | End: 2022-03-02

## 2022-03-01 RX ADMIN — RALTEGRAVIR 400 MILLIGRAM(S): 400 TABLET, FILM COATED ORAL at 20:31

## 2022-03-01 RX ADMIN — RALTEGRAVIR 400 MILLIGRAM(S): 400 TABLET, FILM COATED ORAL at 09:48

## 2022-03-01 RX ADMIN — SERTRALINE 25 MILLIGRAM(S): 25 TABLET, FILM COATED ORAL at 20:31

## 2022-03-01 RX ADMIN — APIXABAN 5 MILLIGRAM(S): 2.5 TABLET, FILM COATED ORAL at 20:31

## 2022-03-01 RX ADMIN — METFORMIN HYDROCHLORIDE 1000 MILLIGRAM(S): 850 TABLET ORAL at 20:31

## 2022-03-01 RX ADMIN — APIXABAN 5 MILLIGRAM(S): 2.5 TABLET, FILM COATED ORAL at 09:47

## 2022-03-01 RX ADMIN — Medication 3 MILLIGRAM(S): at 20:30

## 2022-03-01 RX ADMIN — LITHIUM CARBONATE 900 MILLIGRAM(S): 300 TABLET, EXTENDED RELEASE ORAL at 20:30

## 2022-03-01 RX ADMIN — METFORMIN HYDROCHLORIDE 1000 MILLIGRAM(S): 850 TABLET ORAL at 09:47

## 2022-03-01 RX ADMIN — EMTRICITABINE AND TENOFOVIR DISOPROXIL FUMARATE 1 TABLET(S): 200; 300 TABLET, FILM COATED ORAL at 09:48
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
## 2022-03-01 NOTE — CONSULT NOTE ADULT - PROBLEM SELECTOR RECOMMENDATION 3
old , had open pulmonary thrombectomy  on chronic anticoagulation     ( being followed by cardiologist at [facility] )

## 2022-03-01 NOTE — PROGRESS NOTE BEHAVIORAL HEALTH - OTHER
pt denies current SI but does report a longstanding h/o intermittent waxing and waning SI without plan or intent dysphoric

## 2022-03-01 NOTE — CONSULT NOTE ADULT - PROBLEM SELECTOR RECOMMENDATION 9
? vasovagal , low SBP due to dehydration , low normal range sbp while on prazosin  , encourage patient increase fluid intake , recommended to discontinue prazosin  ,  check orthostatic BP , avoid sudden change in position

## 2022-03-01 NOTE — PROGRESS NOTE BEHAVIORAL HEALTH - AXIS III
s/p open heart surgery  with h/o blood  clot ( pt on Anticoagulant  Eliquis)   pre DM  obesity  recent h/o syncopal episodes prior to admission and current orthostasis

## 2022-03-01 NOTE — CONSULT NOTE ADULT - PROBLEM SELECTOR RECOMMENDATION 2
Non specific T wave inversion  V1 to V3 without chest pain , prior PE ? normal variant , follow up echo. troponin level
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
## 2022-03-01 NOTE — CONSULT NOTE ADULT - SUBJECTIVE AND OBJECTIVE BOX
CHIEF COMPLAINT:    HPI: Patient is a 22 y/o F, goes by "Saad" and preferred pronouns they/them. Patient was living in a group home until recently has been back home with their adoptive parents for the past 3 weeks. Patient has pmhx of prediabetes on Metformin, and history  of pulmonary embolism while birth control pills , had clot retrieving surgery after PE on chronic anticoagulation  under care of cardiologist ( [facility] )  who gives hx of passing out at home 3 days ago , and felt very dizzy about to pass out while in psych units  . Patient says she did pass out in the past twice , patient  was started on prazosin a month ago for PTSD . patient was feeling dizziness when she gets up quickly , she did pass out at home few days ago while she was standing in shower , woke up on floor without any trauma , denies any chest pain or shortness of breath  or palpitations on routine activity . patient blood pressure in low 100s , patient  does not drink enough liquids ,     Patient does have mild shortness of breath on unusual activity       PAST MEDICAL & SURGICAL HISTORY:  Borderline personality disorder    Depression    JEREMY (generalized anxiety disorder)    Autism    Diabetes   Pulmonary embolism   H/O blood clots    History of open heart surgery        Allergies    No Known Allergies    Intolerances        SOCIAL HISTORY: non smoker , no illicit drug use   no alcohol     FAMILY HISTORY:  adopted       MEDICATIONS:Home Medications:    MEDICATIONS  (STANDING):  apixaban 5 milliGRAM(s) Oral every 12 hours  emtricitabine 200 mG/tenofovir 300 mG (TRUVADA) 1 Tablet(s) Oral daily  lithium CR (ESKALITH-CR) 900 milliGRAM(s) Oral at bedtime  melatonin 3 milliGRAM(s) Oral at bedtime  metFORMIN 1000 milliGRAM(s) Oral two times a day  raltegravir (12 Hour) 400 milliGRAM(s) Oral two times a day    MEDICATIONS  (PRN):  diphenhydrAMINE 50 milliGRAM(s) Oral every 6 hours PRN Extrapyramidal prophylaxis/ anxiety  LORazepam     Tablet 2 milliGRAM(s) Oral every 6 hours PRN Agitation      REVIEW OF SYSTEMS:as above     CONSTITUTIONAL: No weakness, fevers or chills  EYES/ENT: No visual changes;  No vertigo or throat pain   NECK: No pain or stiffness  RESPIRATORY: No cough, wheezing, hemoptysis; No shortness of breath  CARDIOVASCULAR: No chest pain or palpitations  GASTROINTESTINAL: No abdominal or epigastric pain. No nausea, vomiting, or hematemesis; No diarrhea or constipation. No melena or hematochezia.  GENITOURINARY: No dysuria, frequency or hematuria  NEUROLOGICAL: No numbness or weakness  SKIN: No itching, burning, rashes, or lesions   All other review of systems is negative unless indicated above    Vital Signs Last 24 Hrs  T(C): 36.6 (01 Mar 2022 07:46), Max: 36.6 (01 Mar 2022 07:46)  T(F): 97.8 (01 Mar 2022 07:46), Max: 97.8 (01 Mar 2022 07:46)  HR: 113 (28 Feb 2022 19:45) (113 - 113)  BP: 96/79 (28 Feb 2022 19:45) (96/79 - 96/79)  BP(mean): --  RR: 18 (01 Mar 2022 07:46) (16 - 18)  SpO2: 100% (01 Mar 2022 07:46) (100% - 100%)    I&O's Summary      PHYSICAL EXAM:    Constitutional: NAD, awake and alert, well-developed  HEENT: PERR, EOMI,  No oral cyananosis.  Neck:  supple,  No JVD  Respiratory: Breath sounds are clear bilaterally, No wheezing, rales or rhonchi  Cardiovascular: S1 and S2, regular rate and rhythm, no Murmurs, gallops or rubs  Gastrointestinal: Bowel Sounds present, soft, nontender.   Extremities: No peripheral edema. No clubbing or cyanosis.  Vascular: 2+ peripheral pulses  Neurological: A/O x 3, no focal deficits  Musculoskeletal: no calf tenderness.  Skin: No rashes.      LABS: All Labs Reviewed:                        12.2   6.63  )-----------( 323      ( 28 Feb 2022 11:34 )             40.5                         12.2   12.73 )-----------( 376      ( 26 Feb 2022 18:34 )             40.6     28 Feb 2022 11:34    139    |  109    |  9      ----------------------------<  90     3.8     |  28     |  1.06   26 Feb 2022 18:34    141    |  109    |  11     ----------------------------<  85     3.6     |  26     |  1.30     Ca    8.9        28 Feb 2022 11:34  Ca    9.5        26 Feb 2022 18:34    TPro  6.8    /  Alb  3.1    /  TBili  0.2    /  DBili  x      /  AST  45     /  ALT  39     /  AlkPhos  78     28 Feb 2022 11:34            Blood Culture:     02-28 @ 11:34  TSH: 3.83      RADIOLOGY/EKG:e< from: 12 Lead ECG (02.27.22 @ 03:42) >  Normal sinus rhythm  Normal ECG  When compared with ECG of 26-FEB-2022 18:21,  ( prior ekg was lead reversal )  Sinus rhythm   QRS axis Shifted left  Nonspecific T wave abnormality no longer evident in Lateral leads  Confirmed by REYNALDO JASON PAUL ([id number]) on 2/27/2022 11:56:54 AM    < end of copied text >  < from: 12 Lead ECG (02.28.22 @ 20:40) >  Normal sinus rhythm  Possible Left atrial enlargement  T wave abnormality, V1 to V3   more pronounced than prior ekg ( no chest pain )   Abnormal ECG  Confirmed by CRAIG VARGAS MD ([id number]) on 3/1/2022 7:09:15 AM    < end of copied text >
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
## 2022-03-02 PROCEDURE — 99231 SBSQ HOSP IP/OBS SF/LOW 25: CPT

## 2022-03-02 PROCEDURE — 99233 SBSQ HOSP IP/OBS HIGH 50: CPT

## 2022-03-02 RX ORDER — ACETAMINOPHEN 500 MG
650 TABLET ORAL EVERY 6 HOURS
Refills: 0 | Status: DISCONTINUED | OUTPATIENT
Start: 2022-03-02 | End: 2022-03-04

## 2022-03-02 RX ADMIN — RALTEGRAVIR 400 MILLIGRAM(S): 400 TABLET, FILM COATED ORAL at 09:03

## 2022-03-02 RX ADMIN — RALTEGRAVIR 400 MILLIGRAM(S): 400 TABLET, FILM COATED ORAL at 21:29

## 2022-03-02 RX ADMIN — METFORMIN HYDROCHLORIDE 1000 MILLIGRAM(S): 850 TABLET ORAL at 21:29

## 2022-03-02 RX ADMIN — LITHIUM CARBONATE 900 MILLIGRAM(S): 300 TABLET, EXTENDED RELEASE ORAL at 21:29

## 2022-03-02 RX ADMIN — EMTRICITABINE AND TENOFOVIR DISOPROXIL FUMARATE 1 TABLET(S): 200; 300 TABLET, FILM COATED ORAL at 09:02

## 2022-03-02 RX ADMIN — METFORMIN HYDROCHLORIDE 1000 MILLIGRAM(S): 850 TABLET ORAL at 09:02

## 2022-03-02 RX ADMIN — Medication 3 MILLIGRAM(S): at 21:28

## 2022-03-02 RX ADMIN — APIXABAN 5 MILLIGRAM(S): 2.5 TABLET, FILM COATED ORAL at 21:29

## 2022-03-02 RX ADMIN — APIXABAN 5 MILLIGRAM(S): 2.5 TABLET, FILM COATED ORAL at 09:02

## 2022-03-02 RX ADMIN — Medication 50 MILLIGRAM(S): at 21:29

## 2022-03-02 NOTE — PROGRESS NOTE ADULT - SUBJECTIVE AND OBJECTIVE BOX
CHIEF COMPLAINT:    HPI: Patient is a 22 y/o F, goes by "Saad" and preferred pronouns they/them. Patient was living in a group home until recently has been back home with their adoptive parents for the past 3 weeks. Patient has pmhx of prediabetes on Metformin, and history  of pulmonary embolism while birth control pills , had clot retrieving surgery after PE on chronic anticoagulation  under care of cardiologist ( [facility] )  who gives hx of passing out at home 3 days ago , and felt very dizzy about to pass out while in psych units  . Patient says she did pass out in the past twice , patient  was started on prazosin a month ago for PTSD . patient was feeling dizziness when she gets up quickly , she did pass out at home few days ago while she was standing in shower , woke up on floor without any trauma , denies any chest pain or shortness of breath  or palpitations on routine activity . patient blood pressure in low 100s , patient  does not drink enough liquids ,     Patient does have mild shortness of breath on unusual activity     3/2/'22: lightheadness improved after d/cing prazosin.  reviewed cardiac testing results - normal.    MEDICATIONS:  OUTPATIENT  Home Medications:      INPATIENT  MEDICATIONS  (STANDING):  apixaban 5 milliGRAM(s) Oral every 12 hours  emtricitabine 200 mG/tenofovir 300 mG (TRUVADA) 1 Tablet(s) Oral daily  lithium CR (ESKALITH-CR) 900 milliGRAM(s) Oral at bedtime  melatonin 3 milliGRAM(s) Oral at bedtime  metFORMIN 1000 milliGRAM(s) Oral two times a day  raltegravir (12 Hour) 400 milliGRAM(s) Oral two times a day    MEDICATIONS  (PRN):  diphenhydrAMINE 50 milliGRAM(s) Oral every 6 hours PRN Extrapyramidal prophylaxis/ anxiety  LORazepam     Tablet 2 milliGRAM(s) Oral every 6 hours PRN Agitation          Vital Signs Last 24 Hrs  T(C): 36.6 (02 Mar 2022 08:09), Max: 36.6 (02 Mar 2022 08:09)  T(F): 97.9 (02 Mar 2022 08:09), Max: 97.9 (02 Mar 2022 08:09)  HR: --  BP: --  BP(mean): --  RR: 12 (02 Mar 2022 08:09) (12 - 12)  SpO2: 100% (02 Mar 2022 08:09) (100% - 100%)Daily     Daily I&O's Summary      I&O's Detail      I&O's Summary        PHYSICAL EXAM:    Constitutional: NAD, awake and alert, well-developed  HEENT: PERR, EOMI,  No oral cyananosis.  Neck:  supple,  No JVD  Respiratory: Breath sounds are clear bilaterally, No wheezing, rales or rhonchi  Cardiovascular: S1 and S2, regular rate and rhythm, no Murmurs, gallops or rubs  Gastrointestinal: Bowel Sounds present, soft, nontender.   Extremities: No peripheral edema. No clubbing or cyanosis.  Vascular: 2+ peripheral pulses  Neurological: A/O x 3, no focal deficits  Musculoskeletal: no calf tenderness.  Skin: No rashes.        ===============================  ===============================  LABS:                         12.2   6.63  )-----------( 323      ( 28 Feb 2022 11:34 )             40.5     28 Feb 2022 11:34    139    |  109    |  9      ----------------------------<  90     3.8     |  28     |  1.06     Ca    8.9        28 Feb 2022 11:34    TPro  6.8    /  Alb  3.1    /  TBili  0.2    /  DBili  x      /  AST  45     /  ALT  39     /  AlkPhos  78     28 Feb 2022 11:34      ===============================  ===============================  CARDIAC BIOMARKERS:  BNP  Serum Pro-Brain Natriuretic Peptide: 122 pg/mL [0 - 125] (12-03-21 @ 14:49)      TROPONIN  Troponin I, High Sensitivity Result: 3.38 ng/L (03-01-22 @ 12:21)  Troponin I, High Sensitivity Result: 7.18 ng/L (12-14-21 @ 20:56)  Troponin I, High Sensitivity Result: 7.09 ng/L (12-03-21 @ 18:04)    ===============================  ===============================  BLOOD CULTURES:    Blood Culture:     02-28 @ 11:34  TSH: 3.83    ===============================  ===============================  RADIOLOGY/EKG:e< from: 12 Lead ECG (02.27.22 @ 03:42) >  Normal sinus rhythm  Normal ECG  When compared with ECG of 26-FEB-2022 18:21,  ( prior ekg was lead reversal )  Sinus rhythm   QRS axis Shifted left  Nonspecific T wave abnormality no longer evident in Lateral leads  Confirmed by REYNALDO JASON PAUL ([id number]) on 2/27/2022 11:56:54 AM    < end of copied text >  < from: 12 Lead ECG (02.28.22 @ 20:40) >  Normal sinus rhythm  Possible Left atrial enlargement  T wave abnormality, V1 to V3   more pronounced than prior ekg ( no chest pain )   Abnormal ECG  Confirmed by AKI VARGAS MD ([id number]) on 3/1/2022 7:09:15 AM    < end of copied text >         Impression     Summary     Endocardium is not well visualized in the apical windows. Grossly, normal   overall left ventricular systolic function. Estimated left ventricular   ejection fraction is 55-60 %.   Mild to moderate tricuspid valve regurgitation.     Signature     ----------------------------------------------------------------   Electronically signed by Aki Vargas MD(Interpreting   physician) on 03/01/2022 01:19 PM   ----------------------------------------------------------------      ===============================    David Valverde M.D.  Cardiology, [facility]  Cell: [phone]  Offices:    ([facility] Office)   ([facility] Office)
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
## 2022-03-02 NOTE — PROGRESS NOTE ADULT - PROBLEM SELECTOR PLAN 3
old , had open pulmonary thrombectomy  on chronic anticoagulation     ( being followed by cardiologist at [facility] ).      Will sign off please call if quesitons.

## 2022-03-02 NOTE — PROGRESS NOTE ADULT - PROBLEM SELECTOR PLAN 1
? vasovagal , low SBP due to dehydration , low normal range sbp while on prazosin  , encourage patient increase fluid intake , recommended to discontinued prazosin & lightheadness improved , orthostatic BP neg , avoid sudden change in position.    No further evaluation indicated.
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
## 2022-03-02 NOTE — PROGRESS NOTE BEHAVIORAL HEALTH - OTHER
" I feel good." pt denies current SI but does report a longstanding h/o intermittent waxing and waning SI without plan or intent

## 2022-03-02 NOTE — PROGRESS NOTE BEHAVIORAL HEALTH - NSBHFUPINTERVALCCFT_PSY_A_CORE
" I'm really feeling a lot better . I hope I don't have to spend the weekend here. Can you discontinue the Zoloft? It made me restless and I couldn't sleep well."    Cardiology Consult requested and appreciated on 3/1/2022 due to pt c/o feeling light headed  with a recent h/o syncopal episodes prior to admission.   ECHO 3/1/2022- Left EF = 55-60 %  Moderate tricuspid regurgitation

## 2022-03-02 NOTE — PROGRESS NOTE ADULT - PROBLEM SELECTOR PLAN 2
Non specific T wave inversion  V1 to V3 without chest pain , prior PE ? normal variant , echo normal troponin level normal no further evaluation.

## 2022-03-03 PROCEDURE — 99231 SBSQ HOSP IP/OBS SF/LOW 25: CPT

## 2022-03-03 RX ADMIN — Medication 3 MILLIGRAM(S): at 20:52

## 2022-03-03 RX ADMIN — RALTEGRAVIR 400 MILLIGRAM(S): 400 TABLET, FILM COATED ORAL at 20:51

## 2022-03-03 RX ADMIN — APIXABAN 5 MILLIGRAM(S): 2.5 TABLET, FILM COATED ORAL at 20:51

## 2022-03-03 RX ADMIN — LITHIUM CARBONATE 900 MILLIGRAM(S): 300 TABLET, EXTENDED RELEASE ORAL at 20:51

## 2022-03-03 RX ADMIN — Medication 50 MILLIGRAM(S): at 09:00

## 2022-03-03 RX ADMIN — METFORMIN HYDROCHLORIDE 1000 MILLIGRAM(S): 850 TABLET ORAL at 09:01

## 2022-03-03 RX ADMIN — Medication 50 MILLIGRAM(S): at 20:51

## 2022-03-03 RX ADMIN — METFORMIN HYDROCHLORIDE 1000 MILLIGRAM(S): 850 TABLET ORAL at 20:51

## 2022-03-03 RX ADMIN — APIXABAN 5 MILLIGRAM(S): 2.5 TABLET, FILM COATED ORAL at 09:01

## 2022-03-03 RX ADMIN — EMTRICITABINE AND TENOFOVIR DISOPROXIL FUMARATE 1 TABLET(S): 200; 300 TABLET, FILM COATED ORAL at 09:02

## 2022-03-03 RX ADMIN — RALTEGRAVIR 400 MILLIGRAM(S): 400 TABLET, FILM COATED ORAL at 09:01

## 2022-03-03 NOTE — PROGRESS NOTE BEHAVIORAL HEALTH - NSBHCHARTREVIEWVS_PSY_A_CORE FT
Vital Signs Last 24 Hrs  T(C): 37.1 (27 Feb 2022 11:10), Max: 37.1 (27 Feb 2022 11:10)  T(F): 98.7 (27 Feb 2022 11:10), Max: 98.7 (27 Feb 2022 11:10)  HR: 100 (27 Feb 2022 11:10) (70 - 100)  BP: 103/63 (27 Feb 2022 11:10) (102/63 - 120/66)  BP(mean): 78 (27 Feb 2022 10:31) (70 - 78)  RR: 18 (27 Feb 2022 11:10) (16 - 18)  SpO2: 96% (27 Feb 2022 11:10) (96% - 100%)
Vital Signs Last 24 Hrs  T(C): 36.6 (01 Mar 2022 07:46), Max: 36.6 (01 Mar 2022 07:46)  T(F): 97.8 (01 Mar 2022 07:46), Max: 97.8 (01 Mar 2022 07:46)  HR: 113 (28 Feb 2022 19:45) (113 - 113)  BP: 96/79 (28 Feb 2022 19:45) (96/79 - 96/79)  BP(mean): --  RR: 18 (01 Mar 2022 07:46) (16 - 18)  SpO2: 100% (01 Mar 2022 07:46) (100% - 100%)
Vital Signs Last 24 Hrs  T(C): 36.4 (28 Feb 2022 07:30), Max: 36.4 (28 Feb 2022 07:30)  T(F): 97.5 (28 Feb 2022 07:30), Max: 97.5 (28 Feb 2022 07:30)  HR: --  BP: --  BP(mean): --  RR: 18 (28 Feb 2022 07:30) (18 - 18)  SpO2: 99% (28 Feb 2022 07:30) (99% - 99%)
Vital Signs Last 24 Hrs  T(C): 36.4 (03 Mar 2022 07:37), Max: 36.4 (03 Mar 2022 07:37)  T(F): 97.5 (03 Mar 2022 07:37), Max: 97.5 (03 Mar 2022 07:37)  HR: 91 (02 Mar 2022 21:04) (91 - 91)  BP: 109/78 (02 Mar 2022 21:04) (109/78 - 109/78)  BP(mean): --  RR: 16 (03 Mar 2022 07:37) (15 - 16)  SpO2: 100% (03 Mar 2022 07:37) (100% - 100%)
Vital Signs Last 24 Hrs  T(C): 36.6 (02 Mar 2022 08:09), Max: 36.6 (02 Mar 2022 08:09)  T(F): 97.9 (02 Mar 2022 08:09), Max: 97.9 (02 Mar 2022 08:09)  HR: --  BP: --  BP(mean): --  RR: 12 (02 Mar 2022 08:09) (12 - 12)  SpO2: 100% (02 Mar 2022 08:09) (100% - 100%)
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
## 2022-03-03 NOTE — DISCHARGE NOTE BEHAVIORAL HEALTH - FAMILY HISTORY OF PSYCHIATRIC ILLNESS
Patient is a 20 y/o patient, nonbinary, goes by "Saad" and prefers they/them pronouns, domiciled at [facility] group home but has been staying with her parents for the past 3 weeks Patient is a 20 y/o patient, nonbinary, goes by "Saad" and prefers they/them pronouns, domiciled at [facility] group home but has been staying with their parents for the past 3 weeks. The patient has benefits in place. No legal history. Patient is adopted, but reports that biological mother was diagnosed with Borderline Personality Disorder and Substance Abuse Disorder and their biological sister was diagnosed with Bipolar Disorder and Substance Abuse Disorder.

## 2022-03-03 NOTE — DISCHARGE NOTE BEHAVIORAL HEALTH - NSBHDCVIOLPROTECTFT_PSY_A_CORE
pt is intelligent, creative, empathic towards others  pt able to function efficiently and effectively  loving family and supportive network

## 2022-03-03 NOTE — DISCHARGE NOTE BEHAVIORAL HEALTH - NSBHDCDXVALIDYESFT_PSY_A_CORE
Depressive disorder  Autism spectrum disorder  Borderline personality disorder  PTSD Major depressive disorder- recurrent, severe without psychotic features   Autism spectrum disorder  Borderline personality disorder  Acute stress reaction

## 2022-03-03 NOTE — DISCHARGE NOTE BEHAVIORAL HEALTH - NS TRANSFER PATIENT BELONGINGS
wallet, license, insurance card, phone screen cracked/Cell Phone/PDA (specify)/Other belongings/Clothing

## 2022-03-03 NOTE — DISCHARGE NOTE BEHAVIORAL HEALTH - NSBHDCVIOLFCTROTHERFT_PSY_A_CORE
BMI above normal limits, recommended weight loss, improve diet and follow up with internist. social isolation  fears of abandonment  over thinking
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
## 2022-03-03 NOTE — PROGRESS NOTE BEHAVIORAL HEALTH - OTHER
pt denies current SI but does report a longstanding h/o intermittent waxing and waning SI without plan or intent " I feel good."
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
## 2022-03-03 NOTE — DISCHARGE NOTE BEHAVIORAL HEALTH - NSBHDCTESTSFT_PSY_A_CORE
TSH TSH=3.38  Fasting lipids  Cholesterol= 129  TG= 158  HgA1C= 5.2  COVID-19 PCR NOT DETECTED 2/26/2022  COVID-AB SPIKE PROTEIN + = 191  Lithium level= 0.6 mmol/L   BUN/Creatinine  HIV negative  STD screen negative   EKG VR= 74  QT /QTc= 374/415  ? Left atrial enlargement  ECHO 3/1/2022  Left ventricular EF 55-60 %  Mild to moderate tricuspid regurgitation  Grossly normal exam  Urine tox screen negative for substances of abuse/ misuse  No studies are pending TSH=3.38  Fasting lipids  Cholesterol= 129  TG= 158  HgA1C= 5.2  COVID-19 PCR NOT DETECTED 2/26/2022  COVID-AB SPIKE PROTEIN + = 191  Lithium level= 0.6 mmol/L   BUN/Creatinine= 9/1.06  HIV negative  STD screen negative   EKG VR= 74  QT /QTc= 374/415  ? Left atrial enlargement  ECHO 3/1/2022  Left ventricular EF 55-60 %  Mild to moderate tricuspid regurgitation  Grossly normal exam  Urine tox screen negative for substances of abuse/ misuse  No studies are pending

## 2022-03-03 NOTE — DISCHARGE NOTE BEHAVIORAL HEALTH - MEDICATION SUMMARY - MEDICATIONS TO STOP TAKING
I will STOP taking the medications listed below when I get home from the hospital:    escitalopram 5 mg oral tablet  -- 3 tab(s) by mouth once a day    lithium 300 mg oral capsule  -- 3 cap(s) by mouth once a day (at bedtime) MDD:900mg
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
## 2022-03-03 NOTE — DISCHARGE NOTE BEHAVIORAL HEALTH - CONDITIONS AT DISCHARGE
Patient alert, oriented x3. Therapist and nurse reviewed safety plan with patient who denies any thoughts to self harm or others. SW and nurse reviewed discharge plan with patient who is in agreement with plan. Patient received a copy of her discharge instructions. All belongings returned to patient.

## 2022-03-03 NOTE — DISCHARGE NOTE BEHAVIORAL HEALTH - CARE PROVIDER_API CALL
Dr. Alexia  Pt has follow up appointment with outpatient psychiatrist Dr. Hale in [place] on 3/7/2022 at 12:30 pm  Phone: (   )    -  Fax: (   )    -  Follow Up Time:
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
## 2022-03-03 NOTE — DISCHARGE NOTE BEHAVIORAL HEALTH - NSBHDCSUICFCTRMIT_PSY_A_CORE
Pt is well educated and organized  pt able to maintain personal safety using coping and problem solving skills

## 2022-03-03 NOTE — PROGRESS NOTE BEHAVIORAL HEALTH - NSBHADMITIPOBSFT_PSY_A_CORE
Pt currently denies SI /HI

## 2022-03-03 NOTE — PROGRESS NOTE BEHAVIORAL HEALTH - SECONDARY DX3
Attention deficit hyperactivity disorder (ADHD), unspecified ADHD type

## 2022-03-03 NOTE — PROGRESS NOTE BEHAVIORAL HEALTH - DETAILS
tremors/restlessness  with Ativan

## 2022-03-03 NOTE — DISCHARGE NOTE BEHAVIORAL HEALTH - NSBHDCMEDSFT_PSY_A_CORE
Discharge medications  apixaban 5 milliGRAM(s) Oral every 12 hours  emtricitabine 200 mG/tenofovir 300 mG (TRUVADA) 1 Tablet(s) Oral daily  lithium CR (ESKALITH-CR) 900 milliGRAM(s) Oral at bedtime  melatonin 3 milliGRAM(s) Oral at bedtime  metFORMIN 1000 milliGRAM(s) Oral two times a day  raltegravir (12 Hour) 400 milliGRAM(s) Oral two times a day    The pt. has been educated to continue the medications unless told by the doctor to stop

## 2022-03-03 NOTE — PROGRESS NOTE BEHAVIORAL HEALTH - NSBHADMITDANGERSELF_PSY_A_CORE
prior to admission/suicidal ideation with plan and means

## 2022-03-03 NOTE — DISCHARGE NOTE BEHAVIORAL HEALTH - NSBHDCSWCOMMENTSFT_PSY_A_CORE
Patient educated about the signs and symptoms of their illness, the need and resources for continuing in the appropriate level of psychiatric outpatient treatment and the need to continue taking the above  medications as prescribed unless directed otherwise by their outpatient provider.

## 2022-03-03 NOTE — PROGRESS NOTE BEHAVIORAL HEALTH - NSBHFUPINTERVALCCFT_PSY_A_CORE
" I'm feeling much better."    Cardiology Consult requested and appreciated on 3/1/2022 due to pt c/o feeling light headed  with a recent h/o syncopal episodes prior to admission.   ECHO 3/1/2022- Left EF = 55-60 %  Moderate tricuspid regurgitation

## 2022-03-03 NOTE — PROGRESS NOTE BEHAVIORAL HEALTH - NSBHCHARTREVIEWIMAGING_PSY_A_CORE FT
MEDICATIONS  (STANDING):  apixaban 5 milliGRAM(s) Oral every 12 hours  emtricitabine 200 mG/tenofovir 300 mG (TRUVADA) 1 Tablet(s) Oral daily  lithium CR (ESKALITH-CR) 900 milliGRAM(s) Oral at bedtime  melatonin 3 milliGRAM(s) Oral at bedtime  metFORMIN 1000 milliGRAM(s) Oral two times a day  raltegravir (12 Hour) 400 milliGRAM(s) Oral two times a day    MEDICATIONS  (PRN):  diphenhydrAMINE 50 milliGRAM(s) Oral every 6 hours PRN Extrapyramidal prophylaxis/ anxiety  LORazepam     Tablet 2 milliGRAM(s) Oral every 6 hours PRN Agitation
MEDICATIONS  (STANDING):  apixaban 5 milliGRAM(s) Oral every 12 hours  emtricitabine 200 mG/tenofovir 300 mG (TRUVADA) 1 Tablet(s) Oral daily  lithium CR (ESKALITH-CR) 900 milliGRAM(s) Oral at bedtime  melatonin 3 milliGRAM(s) Oral at bedtime  metFORMIN 1000 milliGRAM(s) Oral two times a day  raltegravir (12 Hour) 400 milliGRAM(s) Oral two times a day  sertraline 25 milliGRAM(s) Oral at bedtime    MEDICATIONS  (PRN):  diphenhydrAMINE 50 milliGRAM(s) Oral every 6 hours PRN Extrapyramidal prophylaxis/ anxiety  LORazepam     Tablet 2 milliGRAM(s) Oral every 6 hours PRN Agitation
MEDICATIONS  (STANDING):  apixaban 5 milliGRAM(s) Oral every 12 hours  lithium CR (ESKALITH-CR) 900 milliGRAM(s) Oral at bedtime  melatonin 3 milliGRAM(s) Oral at bedtime  metFORMIN 1000 milliGRAM(s) Oral two times a day  prazosin. 2 milliGRAM(s) Oral at bedtime    MEDICATIONS  (PRN):  diphenhydrAMINE 50 milliGRAM(s) Oral every 6 hours PRN Extrapyramidal prophylaxis/ anxiety  LORazepam     Tablet 2 milliGRAM(s) Oral every 6 hours PRN Agitation
MEDICATIONS  (STANDING):  apixaban 5 milliGRAM(s) Oral every 12 hours  emtricitabine 200 mG/tenofovir 300 mG (TRUVADA) 1 Tablet(s) Oral daily  lithium CR (ESKALITH-CR) 900 milliGRAM(s) Oral at bedtime  melatonin 3 milliGRAM(s) Oral at bedtime  metFORMIN 1000 milliGRAM(s) Oral two times a day  prazosin. 2 milliGRAM(s) Oral at bedtime  raltegravir (12 Hour) 400 milliGRAM(s) Oral two times a day    MEDICATIONS  (PRN):  diphenhydrAMINE 50 milliGRAM(s) Oral every 6 hours PRN Extrapyramidal prophylaxis/ anxiety  LORazepam     Tablet 2 milliGRAM(s) Oral every 6 hours PRN Agitation
MEDICATIONS  (STANDING):  apixaban 5 milliGRAM(s) Oral every 12 hours  emtricitabine 200 mG/tenofovir 300 mG (TRUVADA) 1 Tablet(s) Oral daily  lithium CR (ESKALITH-CR) 900 milliGRAM(s) Oral at bedtime  melatonin 3 milliGRAM(s) Oral at bedtime  metFORMIN 1000 milliGRAM(s) Oral two times a day  raltegravir (12 Hour) 400 milliGRAM(s) Oral two times a day    MEDICATIONS  (PRN):  diphenhydrAMINE 50 milliGRAM(s) Oral every 6 hours PRN Extrapyramidal prophylaxis/ anxiety  LORazepam     Tablet 2 milliGRAM(s) Oral every 6 hours PRN Agitation

## 2022-03-03 NOTE — DISCHARGE NOTE BEHAVIORAL HEALTH - SECONDARY DIAGNOSIS.
Borderline personality disorder ADHD Stress reaction, acute, predominant emotional Autism spectrum disorder

## 2022-03-03 NOTE — DISCHARGE NOTE BEHAVIORAL HEALTH - PROVIDER TOKENS
FREE:[LAST:[Alexia],FIRST:[],PHONE:[(   )    -],FAX:[(   )    -],ADDRESS:[Pt has follow up appointment with outpatient psychiatrist Dr. Hale in [place] on 3/7/2022 at 12:30 pm]]

## 2022-03-03 NOTE — DISCHARGE NOTE BEHAVIORAL HEALTH - NSBHDCSUICPROTECTFT_PSY_A_CORE
pt is intelligent, creative  pt able to function efficiently and effectively  loving family and supportive network

## 2022-03-03 NOTE — PROGRESS NOTE BEHAVIORAL HEALTH - PROBLEM SELECTOR PLAN 2
1.DBT /CBT / therapy groups as tolerated  2.Relaxation techniques  3.Ongoing med management as above  4.Collateral family and outpatient treatment team pt clinical information with pt consent  5.Disposition planning ongoing

## 2022-03-03 NOTE — DISCHARGE NOTE BEHAVIORAL HEALTH - NSBHDCCRISISPLAN2FT_PSY_A_CORE
Call Dr. Langston at [facility] at [phone]  Call Dr. Hale at [phone]

## 2022-03-03 NOTE — PROGRESS NOTE BEHAVIORAL HEALTH - SECONDARY DX2
Acute stress reaction with predominately emotional disturbance

## 2022-03-03 NOTE — PROGRESS NOTE BEHAVIORAL HEALTH - NSBHADMITIPBHPROVIDER_PSY_A_CORE
plan to contact with pt consent/no
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
## 2022-03-03 NOTE — DISCHARGE NOTE BEHAVIORAL HEALTH - NSBHDCPURPOSE1FT_PSY_A_CORE
Patient has an appointment with for continuing outpatient psychiatric treatment with psychiatrist, Dr. Hale, on Monday, 3/7/2022 at 12:30PM

## 2022-03-03 NOTE — PROGRESS NOTE BEHAVIORAL HEALTH - THOUGHT CONTENT
Preoccupations/Ruminations/Suicidality
Unremarkable/Other
Unremarkable/Other
Preoccupations/Other
Suicidality

## 2022-03-03 NOTE — DISCHARGE NOTE BEHAVIORAL HEALTH - NSBHDCVIOLFCTRMIT_PSY_A_CORE
Pt is well educated and organized  pt able to maintain personal safety using coping and problem solving skills  pt ongoing use of mindfulness techniques to improve frustration tolerance
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
## 2022-03-03 NOTE — PROGRESS NOTE BEHAVIORAL HEALTH - PROBLEM SELECTOR PLAN 1
1. Pt admitted to inpatient psychiatry on 2/27/2022 on a 9.13 voluntary legal status for acute pt safety and for ongoing evaluation and treatment  2.Continue Eskalith 900 mg po qhs  3.Lexapro 15 mg po q am  4.Ritalin 10 mg po q am   5.Prazosin 2 mg po qhs ( PTSD related nightmares)  6.Collateral pt information with pt consent  7.Pt encouraged to attend therapy groups as tolerated  8.Disposition planning ongoing
1. Pt admitted to inpatient psychiatry on 2/27/2022 on a 9.13 voluntary legal status for acute pt safety and for ongoing evaluation and treatment  2.Continue Eskalith 900 mg po qhs  3.To DC Zoloft 25 mg po qhs (  PTSD related nightmares/ flashbacks) due to pt c/o 'restlessness'   4.Ritalin 10 mg po q am   5. DC Prazosin  ( PTSD related nightmares) due to pt orthostasis  6.Collateral pt information with pt consent  7.Pt encouraged to attend therapy groups as tolerated  8.Disposition planning ongoing
1. Pt admitted to inpatient psychiatry on 2/27/2022 on a 9.13 voluntary legal status for acute pt safety and for ongoing evaluation and treatment  2.Continue Eskalith 900 mg po qhs  3.To DC Zoloft 25 mg po qhs (  PTSD related nightmares/ flashbacks) due to pt c/o 'restlessness'   4.Ritalin 10 mg po q am   5. DC Prazosin  ( PTSD related nightmares) due to pt orthostasis  6.Collateral pt information with pt consent  7.Pt encouraged to attend therapy groups as tolerated  8.Disposition planning ongoing
1. Pt admitted to inpatient psychiatry on 2/27/2022 on a 9.13 voluntary legal status for acute pt safety and for ongoing evaluation and treatment  2.Continue Eskalith 900 mg po qhs  3.Informed consent obtained from the pt for a low dose Zoloft 25 mg po qhs 9 PTSD related nightmares/ flashbacks)  4.Ritalin 10 mg po q am   5. DC Prazosin  ( PTSD related nightmares) due to pt orthostasis  6.Collateral pt information with pt consent  7.Pt encouraged to attend therapy groups as tolerated  8.Disposition planning ongoing
1. Pt admitted to inpatient psychiatry on 2/27/2022 on a 9.13 voluntary legal status for acute pt safety and for ongoing evaluation and treatment  2.Continue Eskalith 900 mg po qhs  3.Lexapro 15 mg po q am  4.Ritalin 10 mg po q am   5.Prazosin 2 mg po qhs ( PTSD related nightmares)  6.Collateral pt information with pt consent  7.Pt encouraged to attend therapy groups as tolerated  8.Disposition planning ongoing
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
## 2022-03-03 NOTE — PROGRESS NOTE BEHAVIORAL HEALTH - THOUGHT PROCESS
Linear/Normal reasoning
Linear
Linear/Normal reasoning
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
## 2022-03-03 NOTE — DISCHARGE NOTE BEHAVIORAL HEALTH - NSBHDCVIOLSAFETYFT_PSY_A_CORE
1.Safety planning reviewed multiple times daily during brief admission to hospital  2.Pt to have follow up with outpatient psychiatrist on 3/7/2022   3.Pt can contact [facility] prior to pt follow up on 3/7/2022 with any pt questions or concerns  4.Pt to receive a list of 24/7 Crisis telephone hotline # to call should future safety concerns arise  5.Pt and family aware that the pt can always return 24/7 to the nearest hospital ED for evaluation should new safety concerns arise  6.Pt and family amenable to the pt's after care treatment plan

## 2022-03-03 NOTE — DISCHARGE NOTE BEHAVIORAL HEALTH - NSBHDCTHERAPYFT_PSY_A_CORE
Psychosocial Assessment  Individual, group, milieu therapies  Psychoeducation related to diagnoses and treatment  Discharge planning

## 2022-03-03 NOTE — PROGRESS NOTE BEHAVIORAL HEALTH - NSBHCHARTREVIEWLAB_PSY_A_CORE FT
CBC Full  -  ( 26 Feb 2022 18:34 )  WBC Count : 12.73 K/uL  RBC Count : 5.03 M/uL  Hemoglobin : 12.2 g/dL  Hematocrit : 40.6 %  Platelet Count - Automated : 376 K/uL  Mean Cell Volume : 80.7 fl  Mean Cell Hemoglobin : 24.3 pg  Mean Cell Hemoglobin Concentration : 30.0 gm/dL  Auto Neutrophil # : 9.58 K/uL  Auto Lymphocyte # : 2.18 K/uL  Auto Monocyte # : 0.53 K/uL  Auto Eosinophil # : 0.34 K/uL  Auto Basophil # : 0.06 K/uL  Auto Neutrophil % : 75.2 %  Auto Lymphocyte % : 17.1 %  Auto Monocyte % : 4.2 %  Auto Eosinophil % : 2.7 %  Auto Basophil % : 0.5 %    02-26    141  |  109<H>  |  11  ----------------------------<  85  3.6   |  26  |  1.30    Ca    9.5      26 Feb 2022 18:34

## 2022-03-03 NOTE — PROGRESS NOTE BEHAVIORAL HEALTH - NSBHCHARTREVIEWINVESTIGATE_PSY_A_CORE FT
EKG  2/27/2022  VR=66  QT /QTc = 406/425  NSR

## 2022-03-03 NOTE — DISCHARGE NOTE BEHAVIORAL HEALTH - NS MD DC FALL RISK RISK
For information on Fall & Injury Prevention, visit: [email or web address] OR  [email or web address] OR  https://www.cdc.gov/steadi/patient.html

## 2022-03-03 NOTE — DISCHARGE NOTE BEHAVIORAL HEALTH - NSBHDCCRISISPLAN1FT_PSY_A_CORE
Talk to a trusted friend, family member or health professional and ask for help  Call the Suicide Hotline at 1-296.297.6728  Call 911 or go to my nearest hospital emergency room

## 2022-03-04 VITALS — OXYGEN SATURATION: 100 % | TEMPERATURE: 97 F | RESPIRATION RATE: 16 BRPM

## 2022-03-04 RX ORDER — METHYLPHENIDATE HCL 5 MG
1 TABLET ORAL
Qty: 0 | Refills: 0 | DISCHARGE
Start: 2022-03-04 | End: 2022-03-18

## 2022-03-04 RX ORDER — LANOLIN ALCOHOL/MO/W.PET/CERES
1 CREAM (GRAM) TOPICAL
Qty: 14 | Refills: 1
Start: 2022-03-04 | End: 2022-03-31

## 2022-03-04 RX ORDER — APIXABAN 2.5 MG/1
1 TABLET, FILM COATED ORAL
Qty: 28 | Refills: 1
Start: 2022-03-04 | End: 2022-03-31

## 2022-03-04 RX ORDER — EMTRICITABINE AND TENOFOVIR DISOPROXIL FUMARATE 200; 300 MG/1; MG/1
1 TABLET, FILM COATED ORAL
Qty: 23 | Refills: 0
Start: 2022-03-04 | End: 2022-03-26

## 2022-03-04 RX ORDER — METFORMIN HYDROCHLORIDE 850 MG/1
1 TABLET ORAL
Qty: 28 | Refills: 1
Start: 2022-03-04 | End: 2022-03-31

## 2022-03-04 RX ORDER — RALTEGRAVIR 400 MG/1
1 TABLET, FILM COATED ORAL
Qty: 46 | Refills: 0
Start: 2022-03-04 | End: 2022-03-26

## 2022-03-04 RX ORDER — LITHIUM CARBONATE 300 MG/1
2 TABLET, EXTENDED RELEASE ORAL
Qty: 28 | Refills: 1
Start: 2022-03-04 | End: 2022-03-31

## 2022-03-04 RX ORDER — METHYLPHENIDATE HCL 5 MG
1 TABLET ORAL
Qty: 15 | Refills: 0
Start: 2022-03-04 | End: 2022-03-18

## 2022-03-04 RX ADMIN — APIXABAN 5 MILLIGRAM(S): 2.5 TABLET, FILM COATED ORAL at 09:13

## 2022-03-04 RX ADMIN — EMTRICITABINE AND TENOFOVIR DISOPROXIL FUMARATE 1 TABLET(S): 200; 300 TABLET, FILM COATED ORAL at 09:13

## 2022-03-04 RX ADMIN — METFORMIN HYDROCHLORIDE 1000 MILLIGRAM(S): 850 TABLET ORAL at 09:13

## 2022-03-04 RX ADMIN — RALTEGRAVIR 400 MILLIGRAM(S): 400 TABLET, FILM COATED ORAL at 09:13

## 2022-03-05 NOTE — CHART NOTE - NSCHARTNOTEFT_GEN_A_CORE
Spoke to mother.  Patient arrived home safely and is not suicidal.  Mother called the unit because medications were not at pharmacy.  She is awaiting a return call from the physician.  Staff made aware.

## 2022-03-08 DIAGNOSIS — R94.31 ABNORMAL ELECTROCARDIOGRAM [ECG] [EKG]: ICD-10-CM

## 2022-03-08 DIAGNOSIS — F33.2 MAJOR DEPRESSIVE DISORDER, RECURRENT SEVERE WITHOUT PSYCHOTIC FEATURES: ICD-10-CM

## 2022-03-08 DIAGNOSIS — F84.0 AUTISTIC DISORDER: ICD-10-CM

## 2022-03-08 DIAGNOSIS — F43.0 ACUTE STRESS REACTION: ICD-10-CM

## 2022-03-08 DIAGNOSIS — Z86.711 PERSONAL HISTORY OF PULMONARY EMBOLISM: ICD-10-CM

## 2022-03-08 DIAGNOSIS — F32.A DEPRESSION, UNSPECIFIED: ICD-10-CM

## 2022-03-08 DIAGNOSIS — R45.851 SUICIDAL IDEATIONS: ICD-10-CM

## 2022-03-08 DIAGNOSIS — F41.1 GENERALIZED ANXIETY DISORDER: ICD-10-CM

## 2022-03-08 DIAGNOSIS — E86.0 DEHYDRATION: ICD-10-CM

## 2022-03-08 DIAGNOSIS — F43.10 POST-TRAUMATIC STRESS DISORDER, UNSPECIFIED: ICD-10-CM

## 2022-03-08 DIAGNOSIS — F90.9 ATTENTION-DEFICIT HYPERACTIVITY DISORDER, UNSPECIFIED TYPE: ICD-10-CM

## 2022-03-08 DIAGNOSIS — F60.3 BORDERLINE PERSONALITY DISORDER: ICD-10-CM

## 2022-03-08 DIAGNOSIS — Z79.01 LONG TERM (CURRENT) USE OF ANTICOAGULANTS: ICD-10-CM

## 2022-03-08 DIAGNOSIS — E11.9 TYPE 2 DIABETES MELLITUS WITHOUT COMPLICATIONS: ICD-10-CM

## 2022-04-07 NOTE — PATIENT PROFILE BEHAVIORAL HEALTH - NSDASAVERBAL_PSY_ALL_CORE
Patient has history of eating frequent simple carbs and higher fat food  Patient needs to keep a food log with blood sugars  Needs to test blood sugars even if skipping a meal.  Needs to eat 3 meals/snacks per day when taking 70/30 insulin; since it has both long and short-acting insulin in it.  70/30 increase 44 units with breakfast, 44 units with second meal, 50 units with 3rd meal/snack. AND increase correction scale: Correction scale: BS<90 -10 units; 150-199 +4; 200-249 +8; 250-299 +12; 300-349 +16. Max daily dose 188 units.    Patient needs to follow with DE and nutrition while following with me.     Yes

## 2022-04-16 ENCOUNTER — EMERGENCY (EMERGENCY)
Facility: HOSPITAL | Age: 22
LOS: 0 days | Discharge: HOME | End: 2022-04-17
Attending: EMERGENCY MEDICINE
Payer: COMMERCIAL

## 2022-04-16 VITALS
TEMPERATURE: 98 F | RESPIRATION RATE: 16 BRPM | WEIGHT: 179.9 LBS | DIASTOLIC BLOOD PRESSURE: 58 MMHG | OXYGEN SATURATION: 99 % | HEIGHT: 66 IN | SYSTOLIC BLOOD PRESSURE: 100 MMHG | HEART RATE: 93 BPM

## 2022-04-16 DIAGNOSIS — K59.00 CONSTIPATION, UNSPECIFIED: ICD-10-CM

## 2022-04-16 DIAGNOSIS — Z79.01 LONG TERM (CURRENT) USE OF ANTICOAGULANTS: ICD-10-CM

## 2022-04-16 DIAGNOSIS — E11.9 TYPE 2 DIABETES MELLITUS WITHOUT COMPLICATIONS: ICD-10-CM

## 2022-04-16 DIAGNOSIS — F60.3 BORDERLINE PERSONALITY DISORDER: ICD-10-CM

## 2022-04-16 DIAGNOSIS — Z20.822 CONTACT WITH AND (SUSPECTED) EXPOSURE TO COVID-19: ICD-10-CM

## 2022-04-16 DIAGNOSIS — F32.A DEPRESSION, UNSPECIFIED: ICD-10-CM

## 2022-04-16 DIAGNOSIS — R07.9 CHEST PAIN, UNSPECIFIED: ICD-10-CM

## 2022-04-16 DIAGNOSIS — Z79.84 LONG TERM (CURRENT) USE OF ORAL HYPOGLYCEMIC DRUGS: ICD-10-CM

## 2022-04-16 DIAGNOSIS — Z98.890 OTHER SPECIFIED POSTPROCEDURAL STATES: Chronic | ICD-10-CM

## 2022-04-16 DIAGNOSIS — F84.0 AUTISTIC DISORDER: ICD-10-CM

## 2022-04-16 DIAGNOSIS — R10.9 UNSPECIFIED ABDOMINAL PAIN: ICD-10-CM

## 2022-04-16 DIAGNOSIS — F41.1 GENERALIZED ANXIETY DISORDER: ICD-10-CM

## 2022-04-16 PROCEDURE — 93010 ELECTROCARDIOGRAM REPORT: CPT

## 2022-04-16 PROCEDURE — 80053 COMPREHEN METABOLIC PANEL: CPT

## 2022-04-16 PROCEDURE — 87635 SARS-COV-2 COVID-19 AMP PRB: CPT

## 2022-04-16 PROCEDURE — 99285 EMERGENCY DEPT VISIT HI MDM: CPT

## 2022-04-16 PROCEDURE — 84484 ASSAY OF TROPONIN QUANT: CPT

## 2022-04-16 PROCEDURE — 99285 EMERGENCY DEPT VISIT HI MDM: CPT | Mod: 25

## 2022-04-16 PROCEDURE — 84702 CHORIONIC GONADOTROPIN TEST: CPT

## 2022-04-16 PROCEDURE — 93005 ELECTROCARDIOGRAM TRACING: CPT

## 2022-04-16 PROCEDURE — 85025 COMPLETE CBC W/AUTO DIFF WBC: CPT

## 2022-04-16 PROCEDURE — 36415 COLL VENOUS BLD VENIPUNCTURE: CPT

## 2022-04-16 PROCEDURE — 83690 ASSAY OF LIPASE: CPT

## 2022-04-16 PROCEDURE — 81001 URINALYSIS AUTO W/SCOPE: CPT

## 2022-04-16 PROCEDURE — 85379 FIBRIN DEGRADATION QUANT: CPT

## 2022-04-16 PROCEDURE — 96374 THER/PROPH/DIAG INJ IV PUSH: CPT

## 2022-04-16 PROCEDURE — 74177 CT ABD & PELVIS W/CONTRAST: CPT | Mod: MA

## 2022-04-16 RX ORDER — SODIUM CHLORIDE 9 MG/ML
1000 INJECTION INTRAMUSCULAR; INTRAVENOUS; SUBCUTANEOUS ONCE
Refills: 0 | Status: COMPLETED | OUTPATIENT
Start: 2022-04-16 | End: 2022-04-16

## 2022-04-16 NOTE — ED ADULT TRIAGE NOTE - CHIEF COMPLAINT QUOTE
Pt BIBEMS from group home, c/o nonradiating generalized chest pain and RLQ abd pain starting tonight suddenly at rest and intermittently throughout the week with associated dizziness and shortness of breath. HX of PE in Eliquis and open heart surgery.  324mg aspirin given by EMS. EKG in progress

## 2022-04-17 VITALS
OXYGEN SATURATION: 99 % | DIASTOLIC BLOOD PRESSURE: 69 MMHG | SYSTOLIC BLOOD PRESSURE: 100 MMHG | TEMPERATURE: 97 F | RESPIRATION RATE: 19 BRPM | HEART RATE: 89 BPM

## 2022-04-17 LAB
ALBUMIN SERPL ELPH-MCNC: 3.6 G/DL — SIGNIFICANT CHANGE UP (ref 3.3–5)
ALP SERPL-CCNC: 78 U/L — SIGNIFICANT CHANGE UP (ref 40–120)
ALT FLD-CCNC: 24 U/L — SIGNIFICANT CHANGE UP (ref 12–78)
ANION GAP SERPL CALC-SCNC: 4 MMOL/L — LOW (ref 5–17)
APPEARANCE UR: CLEAR — SIGNIFICANT CHANGE UP
AST SERPL-CCNC: 13 U/L — LOW (ref 15–37)
BASOPHILS # BLD AUTO: 0.07 K/UL — SIGNIFICANT CHANGE UP (ref 0–0.2)
BASOPHILS NFR BLD AUTO: 0.7 % — SIGNIFICANT CHANGE UP (ref 0–2)
BILIRUB SERPL-MCNC: 0.1 MG/DL — LOW (ref 0.2–1.2)
BILIRUB UR-MCNC: NEGATIVE — SIGNIFICANT CHANGE UP
BUN SERPL-MCNC: 14 MG/DL — SIGNIFICANT CHANGE UP (ref 7–23)
CALCIUM SERPL-MCNC: 9.3 MG/DL — SIGNIFICANT CHANGE UP (ref 8.5–10.1)
CHLORIDE SERPL-SCNC: 112 MMOL/L — HIGH (ref 96–108)
CO2 SERPL-SCNC: 27 MMOL/L — SIGNIFICANT CHANGE UP (ref 22–31)
COLOR SPEC: YELLOW — SIGNIFICANT CHANGE UP
CREAT SERPL-MCNC: 1.01 MG/DL — SIGNIFICANT CHANGE UP (ref 0.5–1.3)
D DIMER BLD IA.RAPID-MCNC: <150 NG/ML DDU — SIGNIFICANT CHANGE UP
DIFF PNL FLD: NEGATIVE — SIGNIFICANT CHANGE UP
EGFR: 81 ML/MIN/1.73M2 — SIGNIFICANT CHANGE UP
EOSINOPHIL # BLD AUTO: 0.21 K/UL — SIGNIFICANT CHANGE UP (ref 0–0.5)
EOSINOPHIL NFR BLD AUTO: 2 % — SIGNIFICANT CHANGE UP (ref 0–6)
GLUCOSE SERPL-MCNC: 78 MG/DL — SIGNIFICANT CHANGE UP (ref 70–99)
GLUCOSE UR QL: NEGATIVE — SIGNIFICANT CHANGE UP
HCG SERPL-ACNC: <1 MIU/ML — SIGNIFICANT CHANGE UP
HCT VFR BLD CALC: 38.8 % — SIGNIFICANT CHANGE UP (ref 34.5–45)
HGB BLD-MCNC: 11.7 G/DL — SIGNIFICANT CHANGE UP (ref 11.5–15.5)
IMM GRANULOCYTES NFR BLD AUTO: 0.3 % — SIGNIFICANT CHANGE UP (ref 0–1.5)
KETONES UR-MCNC: NEGATIVE — SIGNIFICANT CHANGE UP
LEUKOCYTE ESTERASE UR-ACNC: ABNORMAL
LIDOCAIN IGE QN: 159 U/L — SIGNIFICANT CHANGE UP (ref 73–393)
LYMPHOCYTES # BLD AUTO: 2.15 K/UL — SIGNIFICANT CHANGE UP (ref 1–3.3)
LYMPHOCYTES # BLD AUTO: 20.4 % — SIGNIFICANT CHANGE UP (ref 13–44)
MCHC RBC-ENTMCNC: 24.8 PG — LOW (ref 27–34)
MCHC RBC-ENTMCNC: 30.2 GM/DL — LOW (ref 32–36)
MCV RBC AUTO: 82.2 FL — SIGNIFICANT CHANGE UP (ref 80–100)
MONOCYTES # BLD AUTO: 0.5 K/UL — SIGNIFICANT CHANGE UP (ref 0–0.9)
MONOCYTES NFR BLD AUTO: 4.8 % — SIGNIFICANT CHANGE UP (ref 2–14)
NEUTROPHILS # BLD AUTO: 7.56 K/UL — HIGH (ref 1.8–7.4)
NEUTROPHILS NFR BLD AUTO: 71.8 % — SIGNIFICANT CHANGE UP (ref 43–77)
NITRITE UR-MCNC: NEGATIVE — SIGNIFICANT CHANGE UP
PH UR: 7 — SIGNIFICANT CHANGE UP (ref 5–8)
PLATELET # BLD AUTO: 361 K/UL — SIGNIFICANT CHANGE UP (ref 150–400)
POTASSIUM SERPL-MCNC: 3.7 MMOL/L — SIGNIFICANT CHANGE UP (ref 3.5–5.3)
POTASSIUM SERPL-SCNC: 3.7 MMOL/L — SIGNIFICANT CHANGE UP (ref 3.5–5.3)
PROT SERPL-MCNC: 7.3 GM/DL — SIGNIFICANT CHANGE UP (ref 6–8.3)
PROT UR-MCNC: NEGATIVE — SIGNIFICANT CHANGE UP
RBC # BLD: 4.72 M/UL — SIGNIFICANT CHANGE UP (ref 3.8–5.2)
RBC # FLD: 15.7 % — HIGH (ref 10.3–14.5)
SARS-COV-2 RNA SPEC QL NAA+PROBE: SIGNIFICANT CHANGE UP
SODIUM SERPL-SCNC: 143 MMOL/L — SIGNIFICANT CHANGE UP (ref 135–145)
SP GR SPEC: 1 — LOW (ref 1.01–1.02)
UROBILINOGEN FLD QL: NEGATIVE — SIGNIFICANT CHANGE UP
WBC # BLD: 10.52 K/UL — HIGH (ref 3.8–10.5)
WBC # FLD AUTO: 10.52 K/UL — HIGH (ref 3.8–10.5)

## 2022-04-17 PROCEDURE — 74177 CT ABD & PELVIS W/CONTRAST: CPT | Mod: 26,MA

## 2022-04-17 RX ORDER — POLYETHYLENE GLYCOL 3350 17 G/17G
17 POWDER, FOR SOLUTION ORAL ONCE
Refills: 0 | Status: COMPLETED | OUTPATIENT
Start: 2022-04-17 | End: 2022-04-17

## 2022-04-17 RX ORDER — ACETAMINOPHEN 500 MG
1000 TABLET ORAL ONCE
Refills: 0 | Status: COMPLETED | OUTPATIENT
Start: 2022-04-17 | End: 2022-04-17

## 2022-04-17 RX ADMIN — Medication 400 MILLIGRAM(S): at 02:33

## 2022-04-17 RX ADMIN — SODIUM CHLORIDE 1000 MILLILITER(S): 9 INJECTION INTRAMUSCULAR; INTRAVENOUS; SUBCUTANEOUS at 00:34

## 2022-04-17 RX ADMIN — POLYETHYLENE GLYCOL 3350 17 GRAM(S): 17 POWDER, FOR SOLUTION ORAL at 03:32

## 2022-04-17 NOTE — ED PROVIDER NOTE - OBJECTIVE STATEMENT
22 yo female c/o abdominal and chest pain 22 yo female biba from group home c/o abdominal and chest pain that started tonight. no dyspnea no sob, no n/v/d.

## 2022-04-17 NOTE — ED PROVIDER NOTE - PHYSICAL EXAMINATION
Gen:  Well appearing in NAD  Head:  NC/AT  HEENT: pupils perrl,no pharyngeal erythema, uvula midline  Cardiac: S1S2, RRR  Abd: Soft, rlq ttp  Resp: No distress, CTA   musculoskeletal:: no deformities, no swelling, strength +5/+5  Skin: warm and dry as visualized, no rashes  Neuro: NORTH, aao x 3  Psych:alert, cooperative, appropriate mood and affect for situation

## 2022-04-17 NOTE — ED ADULT NURSE NOTE - OBJECTIVE STATEMENT
Pt. report to ED for RLQ abdominal pain. Pt. reports pain is nonradiating started today, pt. reports chest pain that was present last week but no longer is.  Pt. denies nausea, vomiting, SOB. EKG complete, Cardiac monitor in place.  Pt on eliquis s/p open heart surgery for blood clot.

## 2022-04-17 NOTE — ED PROVIDER NOTE - PATIENT PORTAL LINK FT
You can access the FollowMyHealth Patient Portal offered by [facility] by registering at the following website: [email or web address]. By joining [facility]’s FollowMyHealth portal, you will also be able to view your health information using other applications (apps) compatible with our system.

## 2022-04-27 ENCOUNTER — OUTPATIENT (OUTPATIENT)
Dept: OUTPATIENT SERVICES | Facility: HOSPITAL | Age: 22
LOS: 1 days | Discharge: HOME | End: 2022-04-27

## 2022-04-27 DIAGNOSIS — Z98.890 OTHER SPECIFIED POSTPROCEDURAL STATES: Chronic | ICD-10-CM

## 2022-05-16 ENCOUNTER — EMERGENCY (EMERGENCY)
Facility: HOSPITAL | Age: 22
LOS: 0 days | Discharge: HOME | End: 2022-05-16
Attending: EMERGENCY MEDICINE
Payer: COMMERCIAL

## 2022-05-16 VITALS
HEART RATE: 74 BPM | RESPIRATION RATE: 18 BRPM | TEMPERATURE: 98 F | OXYGEN SATURATION: 99 % | WEIGHT: 173.06 LBS | SYSTOLIC BLOOD PRESSURE: 111 MMHG | DIASTOLIC BLOOD PRESSURE: 66 MMHG | HEIGHT: 66 IN

## 2022-05-16 VITALS
DIASTOLIC BLOOD PRESSURE: 60 MMHG | SYSTOLIC BLOOD PRESSURE: 97 MMHG | HEART RATE: 59 BPM | OXYGEN SATURATION: 99 % | RESPIRATION RATE: 18 BRPM | TEMPERATURE: 98 F

## 2022-05-16 DIAGNOSIS — R10.11 RIGHT UPPER QUADRANT PAIN: ICD-10-CM

## 2022-05-16 DIAGNOSIS — E11.9 TYPE 2 DIABETES MELLITUS WITHOUT COMPLICATIONS: ICD-10-CM

## 2022-05-16 DIAGNOSIS — F32.A DEPRESSION, UNSPECIFIED: ICD-10-CM

## 2022-05-16 DIAGNOSIS — Z98.890 OTHER SPECIFIED POSTPROCEDURAL STATES: Chronic | ICD-10-CM

## 2022-05-16 DIAGNOSIS — I26.99 OTHER PULMONARY EMBOLISM WITHOUT ACUTE COR PULMONALE: ICD-10-CM

## 2022-05-16 DIAGNOSIS — K21.9 GASTRO-ESOPHAGEAL REFLUX DISEASE WITHOUT ESOPHAGITIS: ICD-10-CM

## 2022-05-16 DIAGNOSIS — Z79.84 LONG TERM (CURRENT) USE OF ORAL HYPOGLYCEMIC DRUGS: ICD-10-CM

## 2022-05-16 LAB
ALBUMIN SERPL ELPH-MCNC: 3.9 G/DL — SIGNIFICANT CHANGE UP (ref 3.3–5)
ALP SERPL-CCNC: 80 U/L — SIGNIFICANT CHANGE UP (ref 40–120)
ALT FLD-CCNC: 24 U/L — SIGNIFICANT CHANGE UP (ref 12–78)
ANION GAP SERPL CALC-SCNC: 6 MMOL/L — SIGNIFICANT CHANGE UP (ref 5–17)
APPEARANCE UR: CLEAR — SIGNIFICANT CHANGE UP
AST SERPL-CCNC: 18 U/L — SIGNIFICANT CHANGE UP (ref 15–37)
BASOPHILS # BLD AUTO: 0.06 K/UL — SIGNIFICANT CHANGE UP (ref 0–0.2)
BASOPHILS NFR BLD AUTO: 0.8 % — SIGNIFICANT CHANGE UP (ref 0–2)
BILIRUB SERPL-MCNC: 0.2 MG/DL — SIGNIFICANT CHANGE UP (ref 0.2–1.2)
BILIRUB UR-MCNC: NEGATIVE — SIGNIFICANT CHANGE UP
BUN SERPL-MCNC: 11 MG/DL — SIGNIFICANT CHANGE UP (ref 7–23)
CALCIUM SERPL-MCNC: 9.3 MG/DL — SIGNIFICANT CHANGE UP (ref 8.5–10.1)
CHLORIDE SERPL-SCNC: 108 MMOL/L — SIGNIFICANT CHANGE UP (ref 96–108)
CO2 SERPL-SCNC: 23 MMOL/L — SIGNIFICANT CHANGE UP (ref 22–31)
COLOR SPEC: YELLOW — SIGNIFICANT CHANGE UP
CREAT SERPL-MCNC: 1.13 MG/DL — SIGNIFICANT CHANGE UP (ref 0.5–1.3)
DIFF PNL FLD: NEGATIVE — SIGNIFICANT CHANGE UP
EGFR: 71 ML/MIN/1.73M2 — SIGNIFICANT CHANGE UP
EOSINOPHIL # BLD AUTO: 0.15 K/UL — SIGNIFICANT CHANGE UP (ref 0–0.5)
EOSINOPHIL NFR BLD AUTO: 2.1 % — SIGNIFICANT CHANGE UP (ref 0–6)
GLUCOSE SERPL-MCNC: 94 MG/DL — SIGNIFICANT CHANGE UP (ref 70–99)
GLUCOSE UR QL: NEGATIVE — SIGNIFICANT CHANGE UP
HCT VFR BLD CALC: 40.2 % — SIGNIFICANT CHANGE UP (ref 34.5–45)
HGB BLD-MCNC: 12.1 G/DL — SIGNIFICANT CHANGE UP (ref 11.5–15.5)
IMM GRANULOCYTES NFR BLD AUTO: 0.1 % — SIGNIFICANT CHANGE UP (ref 0–1.5)
KETONES UR-MCNC: NEGATIVE — SIGNIFICANT CHANGE UP
LEUKOCYTE ESTERASE UR-ACNC: ABNORMAL
LIDOCAIN IGE QN: 136 U/L — SIGNIFICANT CHANGE UP (ref 73–393)
LYMPHOCYTES # BLD AUTO: 1.9 K/UL — SIGNIFICANT CHANGE UP (ref 1–3.3)
LYMPHOCYTES # BLD AUTO: 26.5 % — SIGNIFICANT CHANGE UP (ref 13–44)
MCHC RBC-ENTMCNC: 24.4 PG — LOW (ref 27–34)
MCHC RBC-ENTMCNC: 30.1 GM/DL — LOW (ref 32–36)
MCV RBC AUTO: 81 FL — SIGNIFICANT CHANGE UP (ref 80–100)
MONOCYTES # BLD AUTO: 0.27 K/UL — SIGNIFICANT CHANGE UP (ref 0–0.9)
MONOCYTES NFR BLD AUTO: 3.8 % — SIGNIFICANT CHANGE UP (ref 2–14)
NEUTROPHILS # BLD AUTO: 4.77 K/UL — SIGNIFICANT CHANGE UP (ref 1.8–7.4)
NEUTROPHILS NFR BLD AUTO: 66.7 % — SIGNIFICANT CHANGE UP (ref 43–77)
NITRITE UR-MCNC: NEGATIVE — SIGNIFICANT CHANGE UP
PH UR: 5 — SIGNIFICANT CHANGE UP (ref 5–8)
PLATELET # BLD AUTO: 318 K/UL — SIGNIFICANT CHANGE UP (ref 150–400)
POTASSIUM SERPL-MCNC: 3.8 MMOL/L — SIGNIFICANT CHANGE UP (ref 3.5–5.3)
POTASSIUM SERPL-SCNC: 3.8 MMOL/L — SIGNIFICANT CHANGE UP (ref 3.5–5.3)
PROT SERPL-MCNC: 7.5 GM/DL — SIGNIFICANT CHANGE UP (ref 6–8.3)
PROT UR-MCNC: NEGATIVE — SIGNIFICANT CHANGE UP
RBC # BLD: 4.96 M/UL — SIGNIFICANT CHANGE UP (ref 3.8–5.2)
RBC # FLD: 14.6 % — HIGH (ref 10.3–14.5)
SODIUM SERPL-SCNC: 137 MMOL/L — SIGNIFICANT CHANGE UP (ref 135–145)
SP GR SPEC: 1.01 — SIGNIFICANT CHANGE UP (ref 1.01–1.02)
UROBILINOGEN FLD QL: NEGATIVE — SIGNIFICANT CHANGE UP
WBC # BLD: 7.16 K/UL — SIGNIFICANT CHANGE UP (ref 3.8–10.5)
WBC # FLD AUTO: 7.16 K/UL — SIGNIFICANT CHANGE UP (ref 3.8–10.5)

## 2022-05-16 PROCEDURE — 85025 COMPLETE CBC W/AUTO DIFF WBC: CPT

## 2022-05-16 PROCEDURE — 80053 COMPREHEN METABOLIC PANEL: CPT

## 2022-05-16 PROCEDURE — 76705 ECHO EXAM OF ABDOMEN: CPT

## 2022-05-16 PROCEDURE — 36415 COLL VENOUS BLD VENIPUNCTURE: CPT

## 2022-05-16 PROCEDURE — 99284 EMERGENCY DEPT VISIT MOD MDM: CPT | Mod: 25

## 2022-05-16 PROCEDURE — 83690 ASSAY OF LIPASE: CPT

## 2022-05-16 PROCEDURE — 99285 EMERGENCY DEPT VISIT HI MDM: CPT

## 2022-05-16 PROCEDURE — 81001 URINALYSIS AUTO W/SCOPE: CPT

## 2022-05-16 PROCEDURE — 76705 ECHO EXAM OF ABDOMEN: CPT | Mod: 26

## 2022-05-16 RX ORDER — SODIUM CHLORIDE 9 MG/ML
1000 INJECTION INTRAMUSCULAR; INTRAVENOUS; SUBCUTANEOUS ONCE
Refills: 0 | Status: COMPLETED | OUTPATIENT
Start: 2022-05-16 | End: 2022-05-16

## 2022-05-16 RX ADMIN — SODIUM CHLORIDE 1000 MILLILITER(S): 9 INJECTION INTRAMUSCULAR; INTRAVENOUS; SUBCUTANEOUS at 14:10

## 2022-05-16 NOTE — ED STATDOCS - PHYSICAL EXAMINATION
PA NOTE: GEN: AOX3, NAD. HEENT: Throat clear. Airway is patent. EYES: PERRLA. EOMI. Head: NC/AT. NECK: Supple, No JVD. FROM. C-spine non-tender. CV:S1S2, RRR, LUNGS: Non-labored breathing, no tachypnea. O2sat 100% RA. CTA b/l. No w/r/r. CHEST: Equal chest expansion and rise. No deformity. ABD: Soft, +Mild tenderness RUQ. No true Moore. NEG McBurney's point tenderness. No rebound, no guarding. No CVAT. EXT: No e/c/c. 2+ distal pulses. SKIN: No rashes. NEURO: No focal deficits. CN II-XII intact. FROM. 5/5 motor and sensory. ~Yusuf Guillermo PA-C

## 2022-05-16 NOTE — ED STATDOCS - CARE PROVIDER_API CALL
Adria Kirby (MD)  Gastroenterology; Internal Medicine  [address] [facility], [address] [place] [address]  Phone: [phone]  Fax: [phone]  Follow Up Time: Urgent

## 2022-05-16 NOTE — ED STATDOCS - ATTENDING APP SHARED VISIT CONTRIBUTION OF CARE
I personally saw the patient with the FERNANDA, and completed the key components of the history and physical exam. I then discussed the management plan with the FERNANDA.

## 2022-05-16 NOTE — ED STATDOCS - OBJECTIVE STATEMENT
22 y/o female prefers to be called Saad with a PMHx of blood clots, DM, autism, JEREMY, GERD, borderline personality disorder, PE, h/o appendectomy in 2018 presents to the ED BIBA from group home c/o RUQ pain since 12am. Pt states she has an eating disorder and her last full meal was 1 week ago. Pt in the process of finding a new therapist. +nausea. Denies vomiting, diarrhea. No other complaints at this time.

## 2022-05-16 NOTE — ED ADULT NURSE REASSESSMENT NOTE - NS ED NURSE REASSESS COMMENT FT1
PT d/c to home. PT became angry that every time they are here in  ED they get discharged without dx. PT denies ever following up with GI as recommended. PT asked if they would like to speak with the Provider again or someone from management, however PT declined stating " I already spent too many hours, I just want to go the fuck home". BP 97/60, HR 59  at time of discharge, DAKOTA Goldberg aware and gives ok for discharge. PT left ED safe without distress.

## 2022-05-16 NOTE — ED STATDOCS - NSFOLLOWUPINSTRUCTIONS_ED_ALL_ED_FT
ABDOMINAL PAIN - General Information           Abdominal Pain    WHAT YOU NEED TO KNOW:    What do I need to know about abdominal pain? Abdominal pain may be felt anywhere between the bottom of your rib cage and your groin. Acute pain usually lasts less than 3 months. Chronic pain lasts longer than 3 months. Your pain may be sharp or dull. The pain may stay in the same place or move around. You may have the pain all the time, or it may come and go. Depending on the cause, you may also have nausea, vomiting, fever, or diarrhea.  Abdominal Organs         What causes abdominal pain? The cause may not be found. The following are common causes:  •Overeating, gas pains, or food poisoning      •Constipation or diarrhea      •An injury      •Appendicitis, a hernia, or an ulcer      •Infection or a blockage      •A liver, gallbladder, or kidney condition      How is the cause of abdominal pain diagnosed? Your healthcare provider will check your abdomen. He or she will ask where your pain is and when it started. Tell him or her if the pain wakes you or stops you from doing your daily activities. Describe anything that makes the pain better or worse. You may also need any of the following:  •Blood, urine, or bowel movement samples may be tested for signs of an infection, disease, or injury.      •X-ray pictures of your abdomen may show an injury or other cause of the pain.      How is abdominal pain treated?   •Prescription pain medicine may be given. Ask your healthcare provider how to take this medicine safely. Some prescription pain medicines contain acetaminophen. Do not take other medicines that contain acetaminophen without talking to your healthcare provider. Too much acetaminophen may cause liver damage. Prescription pain medicine may cause constipation. Ask your healthcare provider how to prevent or treat constipation.       •Medicines may be given to calm your stomach or prevent vomiting.      •Relaxation therapy may be used along with pain medicine.      •Surgery may be needed, depending on the cause.      What can I do to manage or prevent abdominal pain?   •Apply heat on your abdomen for 20 to 30 minutes every 2 hours for as many days as directed. Heat helps decrease pain and muscle spasms.      •Make changes to the foods you eat, if needed. Do not eat foods that cause abdominal pain or other symptoms. Eat small meals more often. The following changes may also help:?Eat more high-fiber foods if you are constipated. High-fiber foods include fruits, vegetables, whole-grain foods, and legumes such as dominguez beans.             ?Do not eat foods that cause gas if you have bloating. Examples include broccoli, cabbage, beans, and carbonated drinks.      ?Do not eat foods or drinks that contain sorbitol or fructose if you have diarrhea and bloating. Some examples are fruit juices, candy, jelly, and sugar-free gum.      ?Do not eat high-fat foods. Examples include fried foods, cheeseburgers, hot dogs, and desserts.      •Make changes to the liquids you drink, if needed. Do not drink liquids that cause pain or make it worse, such as orange juice. Drink liquids throughout the day to stay hydrated. The following changes may also help:?Drink more liquids to prevent dehydration from diarrhea or vomiting. Ask your healthcare provider how much liquid to drink each day and which liquids are best for you.      ?Limit or do not have caffeine. Caffeine may make symptoms such as heartburn or nausea worse.      ?Limit or do not drink alcohol. Alcohol can make your abdominal pain worse. Ask your healthcare provider if it is okay for you to drink alcohol. Also ask how much is okay for you to drink. A drink of alcohol is 12 ounces of beer, ½ ounce of liquor, or 5 ounces of wine.      •Keep a diary of your abdominal pain. A diary may help your healthcare provider learn what is causing your pain. Include when the pain happens, how long it lasts, and what the pain feels like. Write down any other symptoms you have with abdominal pain. Also write down what you eat, and any symptoms you have after you eat.      •Manage stress. Stress may cause abdominal pain. Your healthcare provider may recommend relaxation techniques and deep breathing exercises to help decrease your stress. Your healthcare provider may recommend you talk to someone about your stress or anxiety, such as a counselor or a friend. Get plenty of sleep. Exercise regularly.  Black Family Walking for Exercise           •Do not smoke. Nicotine and other chemicals in cigarettes can damage your esophagus and stomach. Ask your healthcare provider for information if you currently smoke and need help to quit. E-cigarettes or smokeless tobacco still contain nicotine. Talk to your healthcare provider before you use these products.      Call your local emergency number (911 in the [place]) if:   •You have chest pain or shortness of breath.          When should I seek immediate care?   •You have pulsing pain in your upper abdomen or lower back that suddenly becomes constant.      •Your pain is in the right lower abdominal area and worsens with movement.      •You have a fever over 100.4°F (38°C) or shaking chills.      •You are vomiting and cannot keep food or liquids down.      •Your pain does not improve or gets worse over the next 8 to 12 hours.      •You see blood in your vomit or bowel movements, or they look black and tarry.      •Your skin or the whites of your eyes turn yellow.      •You are a woman and have a large amount of vaginal bleeding that is not your monthly period.      When should I call my doctor?   •You have pain in your lower back.      •You are a man and have pain in your testicles.      •You have pain when you urinate.      •You have questions or concerns about your condition or care.      CARE AGREEMENT:    You have the right to help plan your care. Learn about your health condition and how it may be treated. Discuss treatment options with your healthcare providers to decide what care you want to receive. You always have the right to refuse treatment.

## 2022-05-16 NOTE — ED ADULT TRIAGE NOTE - CHIEF COMPLAINT QUOTE
Pt brought in by ambulance from home complaining of right upper quadrant abdominal pain since 2am. Pt has been taking tylenol for the pain and has taken 20pills since 2:30am. Pt lives in a group home.

## 2022-05-16 NOTE — ED STATDOCS - PATIENT PORTAL LINK FT
You can access the FollowMyHealth Patient Portal offered by [facility] by registering at the following website: [email or web address]. By joining [employer] [facility]’s FollowMyHealth portal, you will also be able to view your health information using other applications (apps) compatible with our system.

## 2022-05-16 NOTE — ED STATDOCS - NS ED ATTENDING STATEMENT MOD
This was a shared visit with the FERNANDA. I reviewed and verified the documentation and independently performed the documented:

## 2022-05-16 NOTE — ED STATDOCS - PROGRESS NOTE DETAILS
PA: Patient is a 20 y/o female prefers to be called Saad with PMHx of blood clots, DM, autism, JEREMY, GERD, borderline personality disorder, PE, h/o appendectomy in 2018 who presents to [facility] BIBA from group home c/o RUQ pain since 12am. Pt states she has an eating disorder and her last full meal was 1 week ago. Pt in the process of finding a new therapist. +nausea. Denies vomiting, diarrhea. No other complaints at this time. ~Yusuf Guillermo PA-C PA note: RUQ SONO NEG. All labwork/radiology results discussed in detail with patient. Patient re-examined and re-evaluated. Patient feels much better at this time. ED evaluation, Diagnosis and management discussed with the patient in detail. Workup results discussed with ED attending, OK to dc home. Close GI MD follow up encouraged, aftercare to assist with scheduling appointment ASAP. Strict ED return instructions discussed in detail and patient given the opportunity to ask any questions about their discharge diagnosis and instructions. Patient verbalized understanding. ~ Yusuf Guillermo PA-C

## 2022-05-16 NOTE — ED STATDOCS - CLINICAL SUMMARY MEDICAL DECISION MAKING FREE TEXT BOX
Pt with RUQ pain and tenderness. Will check labs, US, r/o any gall bladder pathology. Pt with RUQ pain and tenderness. Will check labs, US, r/o any gall bladder pathology.    PA note: RUQ SONO NEG. All labwork/radiology results discussed in detail with patient. Patient re-examined and re-evaluated. Patient feels much better at this time. ED evaluation, Diagnosis and management discussed with the patient in detail. Workup results discussed with ED attending, OK to dc home. Close GI MD follow up encouraged, aftercare to assist with scheduling appointment ASAP. Strict ED return instructions discussed in detail and patient given the opportunity to ask any questions about their discharge diagnosis and instructions. Patient verbalized understanding. ~ Yusuf Guillermo PA-C

## 2022-06-28 NOTE — ED ADULT TRIAGE NOTE - CCCP TRG CHIEF CMPLNT
How Severe Are Your Spot(S)?: moderate Hpi Title: Evaluation of Skin Lesions Additional History: Patient is present for a full body skin exam. abdominal pain

## 2022-07-07 NOTE — ED ADULT NURSE NOTE - NS ED NURSE DISCH DISPOSITION
----- Message from Leonie Buerger sent at 7/6/2022  2:14 PM CDT -----  Regarding: CALL PT  CALL PT LATA Discharged

## 2022-08-05 ENCOUNTER — EMERGENCY (EMERGENCY)
Facility: HOSPITAL | Age: 22
LOS: 1 days | Discharge: ANOTHER HOSPITAL | End: 2022-08-05
Admitting: EMERGENCY MEDICINE

## 2022-08-05 ENCOUNTER — OUTPATIENT (OUTPATIENT)
Dept: OUTPATIENT SERVICES | Facility: HOSPITAL | Age: 22
LOS: 1 days | Discharge: HOME | End: 2022-08-05

## 2022-08-05 VITALS
HEIGHT: 66 IN | OXYGEN SATURATION: 99 % | DIASTOLIC BLOOD PRESSURE: 66 MMHG | TEMPERATURE: 98 F | RESPIRATION RATE: 16 BRPM | HEART RATE: 100 BPM | SYSTOLIC BLOOD PRESSURE: 105 MMHG

## 2022-08-05 VITALS
DIASTOLIC BLOOD PRESSURE: 63 MMHG | HEART RATE: 83 BPM | TEMPERATURE: 98 F | RESPIRATION RATE: 16 BRPM | SYSTOLIC BLOOD PRESSURE: 100 MMHG | OXYGEN SATURATION: 100 %

## 2022-08-05 DIAGNOSIS — Z98.890 OTHER SPECIFIED POSTPROCEDURAL STATES: Chronic | ICD-10-CM

## 2022-08-05 LAB
ALBUMIN SERPL ELPH-MCNC: 4 G/DL — SIGNIFICANT CHANGE UP (ref 3.3–5)
ALP SERPL-CCNC: 78 U/L — SIGNIFICANT CHANGE UP (ref 40–120)
ALT FLD-CCNC: 12 U/L — SIGNIFICANT CHANGE UP (ref 4–33)
AMPHET UR-MCNC: NEGATIVE — SIGNIFICANT CHANGE UP
ANION GAP SERPL CALC-SCNC: 11 MMOL/L — SIGNIFICANT CHANGE UP (ref 7–14)
APAP SERPL-MCNC: <10 UG/ML — LOW (ref 15–25)
APPEARANCE UR: ABNORMAL
AST SERPL-CCNC: 16 U/L — SIGNIFICANT CHANGE UP (ref 4–32)
B PERT DNA SPEC QL NAA+PROBE: SIGNIFICANT CHANGE UP
B PERT+PARAPERT DNA PNL SPEC NAA+PROBE: SIGNIFICANT CHANGE UP
BACTERIA # UR AUTO: ABNORMAL
BARBITURATES UR SCN-MCNC: NEGATIVE — SIGNIFICANT CHANGE UP
BASOPHILS # BLD AUTO: 0.04 K/UL — SIGNIFICANT CHANGE UP (ref 0–0.2)
BASOPHILS NFR BLD AUTO: 0.4 % — SIGNIFICANT CHANGE UP (ref 0–2)
BENZODIAZ UR-MCNC: NEGATIVE — SIGNIFICANT CHANGE UP
BILIRUB SERPL-MCNC: 0.3 MG/DL — SIGNIFICANT CHANGE UP (ref 0.2–1.2)
BILIRUB UR-MCNC: NEGATIVE — SIGNIFICANT CHANGE UP
BORDETELLA PARAPERTUSSIS (RAPRVP): SIGNIFICANT CHANGE UP
BUN SERPL-MCNC: 11 MG/DL — SIGNIFICANT CHANGE UP (ref 7–23)
C PNEUM DNA SPEC QL NAA+PROBE: SIGNIFICANT CHANGE UP
CALCIUM SERPL-MCNC: 9.5 MG/DL — SIGNIFICANT CHANGE UP (ref 8.4–10.5)
CHLORIDE SERPL-SCNC: 107 MMOL/L — SIGNIFICANT CHANGE UP (ref 98–107)
CO2 SERPL-SCNC: 21 MMOL/L — LOW (ref 22–31)
COCAINE METAB.OTHER UR-MCNC: NEGATIVE — SIGNIFICANT CHANGE UP
COLOR SPEC: YELLOW — SIGNIFICANT CHANGE UP
CREAT SERPL-MCNC: 1.02 MG/DL — SIGNIFICANT CHANGE UP (ref 0.5–1.3)
CREATININE URINE RESULT, DAU: 258 MG/DL — SIGNIFICANT CHANGE UP
DIFF PNL FLD: NEGATIVE — SIGNIFICANT CHANGE UP
EGFR: 79 ML/MIN/1.73M2 — SIGNIFICANT CHANGE UP
EOSINOPHIL # BLD AUTO: 0.15 K/UL — SIGNIFICANT CHANGE UP (ref 0–0.5)
EOSINOPHIL NFR BLD AUTO: 1.6 % — SIGNIFICANT CHANGE UP (ref 0–6)
EPI CELLS # UR: 16 /HPF — HIGH (ref 0–5)
ETHANOL SERPL-MCNC: <10 MG/DL — SIGNIFICANT CHANGE UP
FLUAV AG NPH QL: SIGNIFICANT CHANGE UP
FLUAV SUBTYP SPEC NAA+PROBE: SIGNIFICANT CHANGE UP
FLUBV AG NPH QL: SIGNIFICANT CHANGE UP
FLUBV RNA SPEC QL NAA+PROBE: SIGNIFICANT CHANGE UP
GLUCOSE SERPL-MCNC: 82 MG/DL — SIGNIFICANT CHANGE UP (ref 70–99)
GLUCOSE UR QL: NEGATIVE — SIGNIFICANT CHANGE UP
HADV DNA SPEC QL NAA+PROBE: SIGNIFICANT CHANGE UP
HCG SERPL-ACNC: <5 MIU/ML — SIGNIFICANT CHANGE UP
HCOV 229E RNA SPEC QL NAA+PROBE: SIGNIFICANT CHANGE UP
HCOV HKU1 RNA SPEC QL NAA+PROBE: SIGNIFICANT CHANGE UP
HCOV NL63 RNA SPEC QL NAA+PROBE: SIGNIFICANT CHANGE UP
HCOV OC43 RNA SPEC QL NAA+PROBE: SIGNIFICANT CHANGE UP
HCT VFR BLD CALC: 39.4 % — SIGNIFICANT CHANGE UP (ref 34.5–45)
HGB BLD-MCNC: 11.8 G/DL — SIGNIFICANT CHANGE UP (ref 11.5–15.5)
HMPV RNA SPEC QL NAA+PROBE: SIGNIFICANT CHANGE UP
HPIV1 RNA SPEC QL NAA+PROBE: SIGNIFICANT CHANGE UP
HPIV2 RNA SPEC QL NAA+PROBE: SIGNIFICANT CHANGE UP
HPIV3 RNA SPEC QL NAA+PROBE: SIGNIFICANT CHANGE UP
HPIV4 RNA SPEC QL NAA+PROBE: SIGNIFICANT CHANGE UP
HYALINE CASTS # UR AUTO: 4 /LPF — SIGNIFICANT CHANGE UP (ref 0–7)
IANC: 6.25 K/UL — SIGNIFICANT CHANGE UP (ref 1.8–7.4)
IMM GRANULOCYTES NFR BLD AUTO: 0.2 % — SIGNIFICANT CHANGE UP (ref 0–1.5)
KETONES UR-MCNC: NEGATIVE — SIGNIFICANT CHANGE UP
LEUKOCYTE ESTERASE UR-ACNC: ABNORMAL
LYMPHOCYTES # BLD AUTO: 2.33 K/UL — SIGNIFICANT CHANGE UP (ref 1–3.3)
LYMPHOCYTES # BLD AUTO: 25.4 % — SIGNIFICANT CHANGE UP (ref 13–44)
M PNEUMO DNA SPEC QL NAA+PROBE: SIGNIFICANT CHANGE UP
MCHC RBC-ENTMCNC: 25 PG — LOW (ref 27–34)
MCHC RBC-ENTMCNC: 29.9 GM/DL — LOW (ref 32–36)
MCV RBC AUTO: 83.5 FL — SIGNIFICANT CHANGE UP (ref 80–100)
METHADONE UR-MCNC: NEGATIVE — SIGNIFICANT CHANGE UP
MONOCYTES # BLD AUTO: 0.37 K/UL — SIGNIFICANT CHANGE UP (ref 0–0.9)
MONOCYTES NFR BLD AUTO: 4 % — SIGNIFICANT CHANGE UP (ref 2–14)
NEUTROPHILS # BLD AUTO: 6.25 K/UL — SIGNIFICANT CHANGE UP (ref 1.8–7.4)
NEUTROPHILS NFR BLD AUTO: 68.4 % — SIGNIFICANT CHANGE UP (ref 43–77)
NITRITE UR-MCNC: NEGATIVE — SIGNIFICANT CHANGE UP
NRBC # BLD: 0 /100 WBCS — SIGNIFICANT CHANGE UP
NRBC # FLD: 0 K/UL — SIGNIFICANT CHANGE UP
OPIATES UR-MCNC: NEGATIVE — SIGNIFICANT CHANGE UP
OXYCODONE UR-MCNC: NEGATIVE — SIGNIFICANT CHANGE UP
PCP SPEC-MCNC: SIGNIFICANT CHANGE UP
PCP UR-MCNC: NEGATIVE — SIGNIFICANT CHANGE UP
PH UR: 5.5 — SIGNIFICANT CHANGE UP (ref 5–8)
PLATELET # BLD AUTO: 272 K/UL — SIGNIFICANT CHANGE UP (ref 150–400)
POTASSIUM SERPL-MCNC: 4.3 MMOL/L — SIGNIFICANT CHANGE UP (ref 3.5–5.3)
POTASSIUM SERPL-SCNC: 4.3 MMOL/L — SIGNIFICANT CHANGE UP (ref 3.5–5.3)
PROT SERPL-MCNC: 7 G/DL — SIGNIFICANT CHANGE UP (ref 6–8.3)
PROT UR-MCNC: ABNORMAL
RAPID RVP RESULT: SIGNIFICANT CHANGE UP
RBC # BLD: 4.72 M/UL — SIGNIFICANT CHANGE UP (ref 3.8–5.2)
RBC # FLD: 13.6 % — SIGNIFICANT CHANGE UP (ref 10.3–14.5)
RBC CASTS # UR COMP ASSIST: 5 /HPF — HIGH (ref 0–4)
RSV RNA NPH QL NAA+NON-PROBE: SIGNIFICANT CHANGE UP
RSV RNA SPEC QL NAA+PROBE: SIGNIFICANT CHANGE UP
RV+EV RNA SPEC QL NAA+PROBE: SIGNIFICANT CHANGE UP
SALICYLATES SERPL-MCNC: <0.3 MG/DL — LOW (ref 15–30)
SARS-COV-2 RNA SPEC QL NAA+PROBE: SIGNIFICANT CHANGE UP
SARS-COV-2 RNA SPEC QL NAA+PROBE: SIGNIFICANT CHANGE UP
SODIUM SERPL-SCNC: 139 MMOL/L — SIGNIFICANT CHANGE UP (ref 135–145)
SP GR SPEC: 1.03 — SIGNIFICANT CHANGE UP (ref 1–1.05)
THC UR QL: NEGATIVE — SIGNIFICANT CHANGE UP
TOXICOLOGY SCREEN, DRUGS OF ABUSE, SERUM RESULT: SIGNIFICANT CHANGE UP
TSH SERPL-MCNC: 2.39 UIU/ML — SIGNIFICANT CHANGE UP (ref 0.27–4.2)
UROBILINOGEN FLD QL: SIGNIFICANT CHANGE UP
WBC # BLD: 9.16 K/UL — SIGNIFICANT CHANGE UP (ref 3.8–10.5)
WBC # FLD AUTO: 9.16 K/UL — SIGNIFICANT CHANGE UP (ref 3.8–10.5)
WBC UR QL: 17 /HPF — HIGH (ref 0–5)

## 2022-08-05 PROCEDURE — 93010 ELECTROCARDIOGRAM REPORT: CPT

## 2022-08-05 PROCEDURE — 99285 EMERGENCY DEPT VISIT HI MDM: CPT | Mod: 25

## 2022-08-05 PROCEDURE — 99285 EMERGENCY DEPT VISIT HI MDM: CPT

## 2022-08-05 RX ORDER — CEPHALEXIN 500 MG
500 CAPSULE ORAL ONCE
Refills: 0 | Status: COMPLETED | OUTPATIENT
Start: 2022-08-05 | End: 2022-08-05

## 2022-08-05 RX ADMIN — Medication 500 MILLIGRAM(S): at 19:23

## 2022-08-05 NOTE — ED BEHAVIORAL HEALTH ASSESSMENT NOTE - DETAILS
prazosin may have contributed to episodes of dizziness. Reports there are many ways she could kill herself but no distinct plan . will be provided to accepting team. self referred [facility]

## 2022-08-05 NOTE — ED BEHAVIORAL HEALTH ASSESSMENT NOTE - RISK ASSESSMENT
High Acute Suicide Risk Risk factors: baseline mood disorder, previous suicide attempts, previous inpatient hospitalizations, autism spec disorder.  Acute risks: depressive episode, suicidal thoughts, inability to engage in safety planning   Protective factors: no known access to weapons, no history of violence, no substance use, no panic.  Imminent suicide risk is MODERATE at this time, plan to mitigate with inpatient level of care for safety and stabilization.

## 2022-08-05 NOTE — ED BEHAVIORAL HEALTH ASSESSMENT NOTE - PSYCHIATRIC ISSUES AND PLAN (INCLUDE STANDING AND PRN MEDICATION)
Hydroxyzine 25 po q6h anxiety, Benadryl 50 mg im q6h prn agitation continue current home medications.

## 2022-08-05 NOTE — ED BEHAVIORAL HEALTH ASSESSMENT NOTE - DESCRIPTION
calm, has not required prn meds    ICU Vital Signs Last 24 Hrs  T(C): 36.9 (05 Aug 2022 16:29), Max: 36.9 (05 Aug 2022 16:29)  T(F): 98.5 (05 Aug 2022 16:29), Max: 98.5 (05 Aug 2022 16:29)  HR: 100 (05 Aug 2022 16:29) (100 - 100)  BP: 105/66 (05 Aug 2022 16:29) (105/66 - 105/66)  BP(mean): --  ABP: --  ABP(mean): --  RR: 16 (05 Aug 2022 16:29) (16 - 16)  SpO2: 99% (05 Aug 2022 16:29) (99% - 99%)    O2 Parameters below as of 05 Aug 2022 16:29  Patient On (Oxygen Delivery Method): room air lives at [facility] group home, unemployed, reports she is staying with parents for the summer. borderline DM- reports her PCP does not recommend home fingersticks.

## 2022-08-05 NOTE — ED BEHAVIORAL HEALTH NOTE - BEHAVIORAL HEALTH NOTE
As per request of provider, writer contacted patient’s mother Amy Lopes ([phone]) to obtain collateral information. The following information is per patient’s mother.    Patient is a 23 YO transgender F To M domiciled in group home for the past 2 years. ([facility]) Patient BIB mother after the patient called the mother stating he didn’t feel safe alone and was scared he might harm himself. Patient has a hx of suicide attempts by trying to drown himself, hx of a plan to overdose and most recently in February stood in the middle of the street in traffic. Patient asked to come to the hospital in hopes of being admitted. Mother brought patient to the [facility] crisis clinic and was recommended to come to the ED. Patient has a hx of autism, borderline personality disorder,ADHD, anxiety and depression. Mother reports patient’s mental state had not been good this past week. Patient reported not caring about anything over the weekend and loss interest in horseback riding and arts. Mother unsure of patient’s sleep hygiene and appetite. Patient’s last psych hospitalization was in February 2022 at [facility]. Patient’ has several hospitalizations at [facility] (2x), [facility] (2x) and [facility] (2-3x). Patient has a hx of self harm by cutting and the mother reports patent last cut around 6 months ago. No drugs or alcohol reported. Medical problems include a hx of a blood clot 2 years ago where he needed open heart surgery. Mother unsure of AV but patient reports sometimes he feels like people are telling him to do things. Patient is covid vaccinatedx2 and has no recent travel or exposure. Medication includes Lithium, concerta, Eliquis and metformin. Mother unsure of dosages. Patient’s mother is Psychiatrist Dr kristine davies. Mother did not have the contact information. Writer agreed to keep the mother updated.  Writer contacted [facility] ( [phone]) and spoke w/ abisai. Abisai agreed to contact the units and see if they could accept the patient and give writer a call back. As per request of provider, writer contacted patient’s mother Amy Lopes ([phone]) to obtain collateral information. The following information is per patient’s mother.    Patient is a 21 YO transgender F To M domiciled in group home for the past 2 years. ([facility]) Patient BIB mother after the patient called the mother stating he didn’t feel safe alone and was scared he might harm himself. Patient has a hx of suicide attempts by trying to drown himself, hx of a plan to overdose and most recently in February stood in the middle of the street in traffic. Patient asked to come to the hospital in hopes of being admitted. Mother brought patient to the [facility] crisis clinic and was recommended to come to the ED. Patient has a hx of autism, borderline personality disorder,ADHD, anxiety and depression. Mother reports patient’s mental state had not been good this past week. Patient reported not caring about anything over the weekend and loss interest in horseback riding and arts. Mother unsure of patient’s sleep hygiene and appetite. Patient’s last psych hospitalization was in February 2022 at [facility]. Patient’ has several hospitalizations at [facility] (2x), [facility] (2x) and [facility] (2-3x). Patient has a hx of self harm by cutting and the mother reports patent last cut around 6 months ago. No drugs or alcohol reported. Medical problems include a hx of a blood clot 2 years ago where he needed open heart surgery. Mother unsure of AV but patient reports sometimes he feels like people are telling him to do things. Patient is covid vaccinatedx2 and has no recent travel or exposure. Medication includes Lithium, concerta, Eliquis and metformin. Mother unsure of dosages. Patient’s mother is Psychiatrist Dr kristine davies. Mother did not have the contact information. Writer agreed to keep the mother updated.    Writer contacted [facility] ( [phone]) and spoke w/ abisai. Abisai agreed to contact the units and see if they could accept the patient and give writer a call back. Writer informed patient was accepted to [facility]. Accepting doctor is Dr Ulisses Mayne As per request of provider, writer contacted patient’s mother Amy Lopes ([phone]) to obtain collateral information. The following information is per patient’s mother.    Patient is a 21 YO transgender F To M domiciled in group home for the past 2 years. ([facility]) Patient BIB mother after the patient called the mother stating he didn’t feel safe alone and was scared he might harm himself. Patient has a hx of suicide attempts by trying to drown himself, hx of a plan to overdose and most recently in February stood in the middle of the street in traffic. Patient asked to come to the hospital in hopes of being admitted. Mother brought patient to the [facility] crisis clinic and was recommended to come to the ED. Patient has a hx of autism, borderline personality disorder,ADHD, anxiety and depression. Mother reports patient’s mental state had not been good this past week. Patient reported not caring about anything over the weekend and loss interest in horseback riding and arts. Mother unsure of patient’s sleep hygiene and appetite. Patient’s last psych hospitalization was in February 2022 at [facility]. Patient’ has several hospitalizations at [facility] (2x), [facility] (2x) and [facility] (2-3x). Patient has a hx of self harm by cutting and the mother reports patent last cut around 6 months ago. No drugs or alcohol reported. Medical problems include a hx of a blood clot 2 years ago where he needed open heart surgery. Mother unsure of AV but patient reports sometimes he feels like people are telling him to do things. Patient is covid vaccinatedx2 and has no recent travel or exposure. Medication includes Lithium, concerta, Eliquis and metformin. Mother unsure of dosages. Patient’s mother is Psychiatrist Dr kristine davies. Mother did not have the contact information. Writer agreed to keep the mother updated.    Writer contacted [facility] ( [phone]) and spoke w/ abisai. Abisai agreed to contact the units and see if they could accept the patient and give writer a call back. Writer informed patient was accepted to [facility]. Accepting doctor is Dr Ulisses Mayen    Writer contacted patient's insurance [facility] ([phone]) to obtain authorization for patient being transferred to [facility]. Writer spoke with clinical care manager Ilana who took down the information (ref#[id number]). Ilana reports someone would call back shortly to complete authorization. As per request of provider, writer contacted patient’s mother Amy Lopes ([phone]) to obtain collateral information. The following information is per patient’s mother.    Patient is a 21 YO transgender F To M domiciled in group home for the past 2 years. ([facility]) Patient BIB mother after the patient called the mother stating he didn’t feel safe alone and was scared he might harm himself. Patient has a hx of suicide attempts by trying to drown himself, hx of a plan to overdose and most recently in February stood in the middle of the street in traffic. Patient asked to come to the hospital in hopes of being admitted. Mother brought patient to the [facility] crisis clinic and was recommended to come to the ED. Patient has a hx of autism, borderline personality disorder,ADHD, anxiety and depression. Mother reports patient’s mental state had not been good this past week. Patient reported not caring about anything over the weekend and loss interest in horseback riding and arts. Mother unsure of patient’s sleep hygiene and appetite. Patient’s last psych hospitalization was in February 2022 at [facility]. Patient’ has several hospitalizations at [facility] (2x), [facility] (2x) and [facility] (2-3x). Patient has a hx of self harm by cutting and the mother reports patent last cut around 6 months ago. No drugs or alcohol reported. Medical problems include a hx of a blood clot 2 years ago where he needed open heart surgery. Mother unsure of AV but patient reports sometimes he feels like people are telling him to do things. Patient is covid vaccinatedx2 and has no recent travel or exposure. Medication includes Lithium, concerta, Eliquis and metformin. Mother unsure of dosages. Patient’s mother is Psychiatrist Dr kristine davies. Mother did not have the contact information. Writer agreed to keep the mother updated.    Writer contacted [facility] ( [phone]) and spoke w/ abisai. Abisai agreed to contact the units and see if they could accept the patient and give writer a call back. Writer informed patient was accepted to [facility]. Accepting doctor is Dr Ulisses Mayen    Writer contacted patient's insurance [facility] ([phone]) to obtain authorization for patient being transferred to [facility]. Writer spoke with clinical care manager Ilana who took down the information (ref#[id number]). Ilana reports someone would call back shortly to complete authorization. Writer received call from Bree CONRAD who provided authorization for 33 days w/ a review day of 9/06/2022. Auth # #[id number]. reviewer to be determined. Writer informed [facility].

## 2022-08-05 NOTE — ED BEHAVIORAL HEALTH ASSESSMENT NOTE - SUMMARY
Patient is a 22 y/o patient, nonbinary, goes by "Saad" and prefers they/them pronouns, domiciled at [facility] group home but has been staying with her parents for the summer,  with pphx of MDD, autism spectrum disorder, borderline personality disorder, anxiety, ADHD, multiple prior hospitalizations, most recent psych admission 2/2022 at [facility], current outpatient tx with Dr. Gabriel, with two previous suicide attempts in 2016, bibs, for suicidal ideation.     Patient present depressed with suicidal ideation despite compliance with outpatient treatment. These symptoms represent a change from baseline and impairing patient's ability to function. Patient is not able to engage in safety planning and is seeking admission for safety and mood stability. Patient does not appear manic or psychotic and current symptoms do not appear to be caused by a medication condition.  Patient is able to state the benefits of hospitalization and will be admitted on a voluntary status.  Recommend  Continue Eliquis 5 mg bid  Continue Lithium 450 mg 2 tabs hs.  Continue Concerta ER 36 mg qam and 10 mg at 1 pm  Continue Metformin 1000mg bid  Patient does not require constant observation Patient is a 22 y/o patient, nonbinary, goes by "Saad" and prefers they/them pronouns, domiciled at [facility] group home but has been staying with her parents for the summer,  with pphx of MDD, autism spectrum disorder, borderline personality disorder, anxiety, ADHD, multiple prior hospitalizations, most recent psych admission 2/2022 at [facility], current outpatient tx with Dr. Gabriel, with two previous suicide attempts in 2016, bibs, for suicidal ideation.     Patient present depressed with suicidal ideation despite compliance with outpatient treatment. These symptoms represent a change from baseline and impairing patient's ability to function. Patient is not able to engage in safety planning and is seeking admission for safety and mood stability. Patient does not appear manic or psychotic and current symptoms do not appear to be caused by a medication condition.  Patient is able to state the benefits of hospitalization and will be admitted on a voluntary status.  Recommend  Continue Eliquis 5 mg bid  Continue Lithium 450 mg 2 tabs hs.  Continue Concerta ER 36 mg qam and 10 mg at 1 p -- confirm dose in am   Continue Metformin 1000mg bid  Continue Keflex 500 mg bid x 7 days for UTI  Patient does not require constant observation Patient is a 22 y/o patient, nonbinary, goes by "Saad" and prefers they/them pronouns, domiciled at [facility] group home but has been staying with her parents for the summer,  with pphx of MDD, autism spectrum disorder, borderline personality disorder, anxiety, ADHD, multiple prior hospitalizations, most recent psych admission 2/2022 at [facility], current outpatient tx with Dr. Gabriel, with two previous suicide attempts in 2016, bibs, for suicidal ideation.     Patient present depressed with suicidal ideation despite compliance with outpatient treatment. These symptoms represent a change from baseline and impairing patient's ability to function. Patient is not able to engage in safety planning and is seeking admission for safety and mood stability. Patient does not appear manic or psychotic and current symptoms do not appear to be caused by a medication condition.  Patient is able to state the benefits of hospitalization and will be admitted on a voluntary status.  Recommend  Continue Eliquis 5 mg bid  Continue Lithium  mg 2 tabs hs.  Continue Concerta ER 36 mg qam and 10 mg at 1 p -- confirm dose in am   Continue Metformin 1000mg bid  Continue Keflex 500 mg bid x 7 days for UTI  Patient does not require constant observation

## 2022-08-05 NOTE — ED BEHAVIORAL HEALTH NOTE - BEHAVIORAL HEALTH NOTE
COVID Exposure Screen- Patient  1.	*Have you had a COVID-19 test in the last 90 days?  ( x ) Yes   (  ) No   (  ) Unknown- Reason: _____  IF YES PROCEED TO QUESTION #2. IF NO OR UNKNOWN, PLEASE SKIP TO QUESTION #3.  2.	Date of test(s) and result(s): _4/2022 negative_______  3.	*Have you tested positive for COVID-19 antibodies? (  x) Yes   (  ) No   (  ) Unknown- Reason: _____  IF YES PROCEED TO QUESTION #4. IF NO or UNKNOWN, PLEASE SKIP TO QUESTION #5.  4.	Date of positive antibody test: ________  5.	*Have you received 2 doses of the COVID-19 vaccine? ( x ) Yes   (  ) No   (  ) Unknown- Reason: _____   IF YES PROCEED TO QUESTION #6. IF NO or UNKNOWN, PLEASE SKIP TO QUESTION #7.  6.	Date of second dose: ?________  7.	*In the past 10 days, have you been around anyone with a positive COVID-19 test?* (  ) Yes   ( x ) No   (  ) Unknown- Reason: ____  IF YES PROCEED TO QUESTION #8. IF NO or UNKNOWN, PLEASE SKIP TO QUESTION #13.  8.	Were you within 6 feet of them for at least 15 minutes? (  ) Yes   (  ) No   (  ) Unknown- Reason: _____  9.	Have you provided care for them? (  ) Yes   (  ) No   (  ) Unknown- Reason: ______  10.	Have you had direct physical contact with them (touched, hugged, or kissed them)? (  ) Yes   (  ) No    (  ) Unknown- Reason: _____  11.	Have you shared eating or drinking utensils with them? (  ) Yes   (  ) No    (  ) Unknown- Reason: ____  12.	Have they sneezed, coughed, or somehow gotten respiratory droplets on you? (  ) Yes   (  ) No    (  ) Unknown- Reason: ______  13.	*Have you been out of [place] within the past 10 days?* (  ) Yes   ( x ) No   (  ) Unknown- Reason: _____  IF YES PLEASE ANSWER THE FOLLOWING QUESTIONS:  14.	Which state/country have you been to? ______  15.	Were you there over 24 hours? (  ) Yes   (  ) No    (  ) Unknown- Reason: ______  16.	Date of return to [facility]: ______

## 2022-08-05 NOTE — ED ADULT TRIAGE NOTE - CHIEF COMPLAINT QUOTE
hx autism, borderline personality disorder, ADHD, depression, anxiety. C/o suicidal ideations with plan.

## 2022-08-05 NOTE — ED PROVIDER NOTE - SKIN, MLM
Please refer to plan of care and A&I for ongoing treatment notes. Skin normal color for race, warm, dry and intact. No evidence of rash.

## 2022-08-05 NOTE — ED PROVIDER NOTE - OBJECTIVE STATEMENT
21 y/o F hx  ADHD, DM, BPD, MDD  presents to ER  secondary to  worsening depression and suicidal ideations .  Admits to "  depressive episodes" and inability to . Denies HI/AV/VH. Denies falling,  punching or kicking any objects. Denies pain , dizziness, SOB, fever, chills, chest/ abdominal discomfort.  Denies  recent use of alcohol or illicit drugs. No evidence of physical injuries, broken skin or deformities.

## 2022-08-05 NOTE — ED BEHAVIORAL HEALTH ASSESSMENT NOTE - NSBHATTESTCOMMENTATTENDFT_PSY_A_CORE
Patient is a 20 y/o patient, nonbinary, goes by "Saad" and prefers they/them pronouns, domiciled at [facility] group home but has been staying with her parents for the summer,  with pphx of MDD, autism spectrum disorder, borderline personality disorder, anxiety, ADHD, multiple prior hospitalizations, most recent psych admission 2/2022 at [facility], current outpatient tx with Dr. Gabriel, with two previous suicide attempts in 2016, bibs, for suicidal ideation.     Patient present depressed with suicidal ideation despite compliance with outpatient treatment. These symptoms represent a change from baseline and impairing patient's ability to function. Patient is not able to engage in safety planning and is seeking admission for safety and mood stability. Patient does not appear manic or psychotic and current symptoms do not appear to be caused by a medication condition.  Patient is able to state the benefits of hospitalization and will be admitted on a voluntary status.

## 2022-08-05 NOTE — ED ADULT NURSE NOTE - OBJECTIVE STATEMENT
hx autism, borderline personality disorder, ADHD, depression, anxiety. C/o suicidal ideations with plan.  Patient to . States that she is having a depressive episode which she has intermittent times. Pt is calm and cooperative. Pt evaluated by medicine and waiting for psych.   MAURICE Dumont

## 2022-08-05 NOTE — ED PROVIDER NOTE - CARE PLAN
1 Principal Discharge DX:	MDD (major depressive disorder)   Principal Discharge DX:	MDD (major depressive disorder)  Secondary Diagnosis:	UTI (urinary tract infection)

## 2022-08-05 NOTE — ED BEHAVIORAL HEALTH ASSESSMENT NOTE - NSBHATTESTAPPAMEND_PSY_A_CORE
I have personally seen and examined this patient. I fully participated in the care of this patient. I have made amendments to the documentation where appropriate and otherwise agree with the history, physical exam, and plan as documented by the FERNANDA

## 2022-08-05 NOTE — ED PROVIDER NOTE - CLINICAL SUMMARY MEDICAL DECISION MAKING FREE TEXT BOX
21 y/o F hx  ADHD, DM, BPD, MDD   Labs, Urine Tox/UA, EKG  Medical evaluation performed. There is no clinical evidence of intoxication or any acute medical problem requiring immediate intervention. Patient is awaiting psychiatric consultation. Final disposition will be determined by psychiatrist. 23 y/o F hx  ADHD, DM, BPD, MDD   Labs, Urine Tox/UA, EKG. Large leuks on UA.  Symptomatic. Urine Culture .Hydration.  Keflex 500 mg BID X 7 Days   Medical evaluation performed. There is no clinical evidence of intoxication or any acute medical problem requiring immediate intervention. Patient is awaiting psychiatric consultation. Final disposition will be determined by psychiatrist.

## 2022-08-05 NOTE — ED ADULT NURSE NOTE - HAVE YOU HAD A SECOND COVID-19 BOOSTER?
Medications Discontinued During This Encounter   Medication Reason    AMLODIPINE BESYLATE, BULK, Duplicate Order    albuterol (VENTOLIN HFA) 90 mcg/actuation inhaler     ibuprofen (MOTRIN) 200 mg tablet     triamcinolone acetonide (KENALOG) 0.1 % topical cream           A Healthy Heart: Care Instructions  Your Care Instructions    Heart disease occurs when a substance called plaque builds up in the vessels that supply oxygen-rich blood to your heart. This can narrow the blood vessels and reduce blood flow. A heart attack happens when blood flow is completely blocked. A high-fat diet, smoking, and other factors increase the risk of heart disease. Your doctor has found that you have a chance of having heart disease. You can do lots of things to keep your heart healthy. It may not be easy, but you can change your diet, exercise more, and quit smoking. These steps really work to lower your chance of heart disease. Follow-up care is a key part of your treatment and safety. Be sure to make and go to all appointments, and call your doctor if you are having problems. It's also a good idea to know your test results and keep a list of the medicines you take. How can you care for yourself at home? Diet    · Use less salt when you cook and eat. This helps lower your blood pressure. Taste food before salting. Add only a little salt when you think you need it. With time, your taste buds will adjust to less salt.     · Eat fewer snack items, fast foods, canned soups, and other high-salt, high-fat, processed foods.     · Read food labels and try to avoid saturated and trans fats. They increase your risk of heart disease by raising cholesterol levels.     · Limit the amount of solid fat-butter, margarine, and shortening-you eat. Use olive, peanut, or canola oil when you cook. Bake, broil, and steam foods instead of frying them.     · Eating fish can lower your risk for heart disease. Eat at least 2 servings of fish a week. [place]vando, mackerel, herring, sardines, and chunk light tuna are very good choices. These fish contain omega-3 fatty acids.     · Eat a variety of fruit and vegetables every day. Dark green, deep orange, red, or yellow fruits and vegetables are especially good for you. Examples include spinach, carrots, peaches, and berries.     · Foods high in fiber can reduce your cholesterol and provide important vitamins and minerals. High-fiber foods include whole-grain cereals and breads, oatmeal, beans, brown rice, citrus fruits, and apples.     · Limit drinks and foods with added sugar. These include candy, desserts, and soda pop.    Lifestyle changes    · If your doctor recommends it, get more exercise. Walking is a good choice. Bit by bit, increase the amount you walk every day. Try for at least 30 minutes on most days of the week. You also may want to swim, bike, or do other activities.     · Do not smoke. If you need help quitting, talk to your doctor about stop-smoking programs and medicines. These can increase your chances of quitting for good. Quitting smoking may be the most important step you can take to protect your heart. It is never too late to quit. You will get health benefits right away.     · Limit alcohol to 2 drinks a day for men and 1 drink a day for women. Too much alcohol can cause health problems. Medicines    · Take your medicines exactly as prescribed. Call your doctor if you think you are having a problem with your medicine.     · If your doctor recommends aspirin, take the amount directed each day. Make sure you take aspirin and not another kind of pain reliever, such as acetaminophen (Tylenol). If you take ibuprofen (such as Advil or Motrin) for other problems, take aspirin at least 2 hours before taking ibuprofen. When should you call for help? Call 911 if you have symptoms of a heart attack.  These may include:    · Chest pain or pressure, or a strange feeling in the chest.     · Sweating.     · Shortness of breath.     · Pain, pressure, or a strange feeling in the back, neck, jaw, or upper belly or in one or both shoulders or arms.     · Lightheadedness or sudden weakness.     · A fast or irregular heartbeat.    After you call 911, the  may tell you to chew 1 adult-strength or 2 to 4 low-dose aspirin. Wait for an ambulance. Do not try to drive yourself.   Watch closely for changes in your health, and be sure to contact your doctor if you have any problems. Where can you learn more? Go to http://jamel-erika.info/. Enter U013 in the search box to learn more about \"A Healthy Heart: Care Instructions. \"  Current as of: July 22, 2018  Content Version: 12.1  © 1931-2671 [employer], [employer]. Care instructions adapted under license by [employer] (which disclaims liability or warranty for this information). If you have questions about a medical condition or this instruction, always ask your healthcare professional. [address] any warranty or liability for your use of this information. No

## 2022-08-05 NOTE — ED BEHAVIORAL HEALTH ASSESSMENT NOTE - HPI (INCLUDE ILLNESS QUALITY, SEVERITY, DURATION, TIMING, CONTEXT, MODIFYING FACTORS, ASSOCIATED SIGNS AND SYMPTOMS)
Patient is a 23 y/o patient, nonbinary, goes by "Saad" and prefers they/them pronouns, domiciled at [facility] group home but has been staying with her parents for the summer, with pphx of depression, autism spectrum disorder, borderline personality disorder, anxiety, ADHD, multiple prior hospitalizations, most recent ( [facility] 2/2022) current outpatient tx with Dr. Gabriel, with two previous suicide attempts in 2016 - OD (no hospitalization req), 2019 - trying to drown self in bathtub, no substance history, no violence/legal issues, with PMH of "prediabetes," open heart surgery for blood clot (on Eliquis),self-presents for suicidal ideation and seeking admission.     Patient reports long h/o depression with worsening symptoms for the past 2 weeks and is unable to identify trigger. Current depressive symptoms include depressed mood, poor sleep, poor appetite, low energy, guilt and suicidal ideation. Patient denies current suicidal plan but states she does not feel safe at home and is seeking admission. Patient is in treatment with Dr. Gabriel and reports compliance with medications. As far as other symptoms patient denies current or recent auditory/visual hallucinations, thoughts of paranoia or symptoms of arturo including increased energy in the context of poor sleep, grandiosity, or rapid pressured speech.   See  note for collateral

## 2022-08-05 NOTE — ED BEHAVIORAL HEALTH ASSESSMENT NOTE - OTHER PAST PSYCHIATRIC HISTORY (INCLUDE DETAILS REGARDING ONSET, COURSE OF ILLNESS, INPATIENT/OUTPATIENT TREATMENT)
history of depression, borderline personality disorder, anxiety, ADHD, autism spec disorder. Prior psychiatric admissions at [facility], [facility], , and [facility]. 2 aborted SA- one pt had a handful of tylenol that a friend stopped her from taking, another pt locked self in the bathroom with intention of drowning self in tub and mom had police break the door; current outpatient tx with psychiatrist Dr. Carlos Hale, not in current therapy

## 2022-08-05 NOTE — ED BEHAVIORAL HEALTH ASSESSMENT NOTE - CURRENT MEDICATION
eliquis 5mg BID, Lithium 750 mg po QHS, metformin 1000mg BID, Concerta ER 36 mg daily and 10 mg at 1pm

## 2022-08-06 LAB
COVID-19 SPIKE DOMAIN AB INTERP: POSITIVE
COVID-19 SPIKE DOMAIN ANTIBODY RESULT: >250 U/ML — HIGH
SARS-COV-2 IGG+IGM SERPL QL IA: >250 U/ML — HIGH
SARS-COV-2 IGG+IGM SERPL QL IA: POSITIVE

## 2022-08-07 LAB
CULTURE RESULTS: SIGNIFICANT CHANGE UP
SPECIMEN SOURCE: SIGNIFICANT CHANGE UP

## 2022-09-05 ENCOUNTER — EMERGENCY (EMERGENCY)
Facility: HOSPITAL | Age: 22
LOS: 0 days | Discharge: HOME | End: 2022-09-05
Attending: EMERGENCY MEDICINE
Payer: COMMERCIAL

## 2022-09-05 VITALS
RESPIRATION RATE: 16 BRPM | OXYGEN SATURATION: 100 % | HEIGHT: 66 IN | TEMPERATURE: 98 F | DIASTOLIC BLOOD PRESSURE: 71 MMHG | WEIGHT: 167.99 LBS | SYSTOLIC BLOOD PRESSURE: 105 MMHG | HEART RATE: 70 BPM

## 2022-09-05 VITALS
RESPIRATION RATE: 18 BRPM | OXYGEN SATURATION: 100 % | DIASTOLIC BLOOD PRESSURE: 59 MMHG | SYSTOLIC BLOOD PRESSURE: 97 MMHG | HEART RATE: 62 BPM | TEMPERATURE: 98 F

## 2022-09-05 DIAGNOSIS — Z98.890 OTHER SPECIFIED POSTPROCEDURAL STATES: Chronic | ICD-10-CM

## 2022-09-05 DIAGNOSIS — F60.3 BORDERLINE PERSONALITY DISORDER: ICD-10-CM

## 2022-09-05 DIAGNOSIS — F41.1 GENERALIZED ANXIETY DISORDER: ICD-10-CM

## 2022-09-05 DIAGNOSIS — F43.9 REACTION TO SEVERE STRESS, UNSPECIFIED: ICD-10-CM

## 2022-09-05 DIAGNOSIS — F84.0 AUTISTIC DISORDER: ICD-10-CM

## 2022-09-05 DIAGNOSIS — Z79.01 LONG TERM (CURRENT) USE OF ANTICOAGULANTS: ICD-10-CM

## 2022-09-05 DIAGNOSIS — Z79.84 LONG TERM (CURRENT) USE OF ORAL HYPOGLYCEMIC DRUGS: ICD-10-CM

## 2022-09-05 DIAGNOSIS — E11.9 TYPE 2 DIABETES MELLITUS WITHOUT COMPLICATIONS: ICD-10-CM

## 2022-09-05 DIAGNOSIS — M54.50 LOW BACK PAIN, UNSPECIFIED: ICD-10-CM

## 2022-09-05 DIAGNOSIS — R10.30 LOWER ABDOMINAL PAIN, UNSPECIFIED: ICD-10-CM

## 2022-09-05 DIAGNOSIS — R53.1 WEAKNESS: ICD-10-CM

## 2022-09-05 DIAGNOSIS — Z20.822 CONTACT WITH AND (SUSPECTED) EXPOSURE TO COVID-19: ICD-10-CM

## 2022-09-05 DIAGNOSIS — F32.A DEPRESSION, UNSPECIFIED: ICD-10-CM

## 2022-09-05 LAB
ALBUMIN SERPL ELPH-MCNC: 3.6 G/DL — SIGNIFICANT CHANGE UP (ref 3.3–5)
ALP SERPL-CCNC: 80 U/L — SIGNIFICANT CHANGE UP (ref 40–120)
ALT FLD-CCNC: 19 U/L — SIGNIFICANT CHANGE UP (ref 12–78)
ANION GAP SERPL CALC-SCNC: 3 MMOL/L — LOW (ref 5–17)
APAP SERPL-MCNC: < 2 UG/ML (ref 10–30)
AST SERPL-CCNC: 36 U/L — SIGNIFICANT CHANGE UP (ref 15–37)
BASOPHILS # BLD AUTO: 0.07 K/UL — SIGNIFICANT CHANGE UP (ref 0–0.2)
BASOPHILS NFR BLD AUTO: 0.5 % — SIGNIFICANT CHANGE UP (ref 0–2)
BILIRUB SERPL-MCNC: 0.4 MG/DL — SIGNIFICANT CHANGE UP (ref 0.2–1.2)
BUN SERPL-MCNC: 11 MG/DL — SIGNIFICANT CHANGE UP (ref 7–23)
CALCIUM SERPL-MCNC: 9 MG/DL — SIGNIFICANT CHANGE UP (ref 8.5–10.1)
CHLORIDE SERPL-SCNC: 108 MMOL/L — SIGNIFICANT CHANGE UP (ref 96–108)
CO2 SERPL-SCNC: 27 MMOL/L — SIGNIFICANT CHANGE UP (ref 22–31)
CREAT SERPL-MCNC: 1.09 MG/DL — SIGNIFICANT CHANGE UP (ref 0.5–1.3)
EGFR: 73 ML/MIN/1.73M2 — SIGNIFICANT CHANGE UP
EOSINOPHIL # BLD AUTO: 0.27 K/UL — SIGNIFICANT CHANGE UP (ref 0–0.5)
EOSINOPHIL NFR BLD AUTO: 2.1 % — SIGNIFICANT CHANGE UP (ref 0–6)
ETHANOL SERPL-MCNC: <10 MG/DL — SIGNIFICANT CHANGE UP (ref 0–10)
FLUAV AG NPH QL: SIGNIFICANT CHANGE UP
FLUBV AG NPH QL: SIGNIFICANT CHANGE UP
GLUCOSE SERPL-MCNC: 84 MG/DL — SIGNIFICANT CHANGE UP (ref 70–99)
HCT VFR BLD CALC: 39.5 % — SIGNIFICANT CHANGE UP (ref 34.5–45)
HGB BLD-MCNC: 12.1 G/DL — SIGNIFICANT CHANGE UP (ref 11.5–15.5)
IMM GRANULOCYTES NFR BLD AUTO: 0.3 % — SIGNIFICANT CHANGE UP (ref 0–1.5)
LITHIUM SERPL-MCNC: 1.1 MMOL/L — SIGNIFICANT CHANGE UP (ref 0.6–1.2)
LYMPHOCYTES # BLD AUTO: 19.9 % — SIGNIFICANT CHANGE UP (ref 13–44)
LYMPHOCYTES # BLD AUTO: 2.58 K/UL — SIGNIFICANT CHANGE UP (ref 1–3.3)
MAGNESIUM SERPL-MCNC: 2.3 MG/DL — SIGNIFICANT CHANGE UP (ref 1.6–2.6)
MCHC RBC-ENTMCNC: 25 PG — LOW (ref 27–34)
MCHC RBC-ENTMCNC: 30.6 GM/DL — LOW (ref 32–36)
MCV RBC AUTO: 81.6 FL — SIGNIFICANT CHANGE UP (ref 80–100)
MONOCYTES # BLD AUTO: 0.45 K/UL — SIGNIFICANT CHANGE UP (ref 0–0.9)
MONOCYTES NFR BLD AUTO: 3.5 % — SIGNIFICANT CHANGE UP (ref 2–14)
NEUTROPHILS # BLD AUTO: 9.53 K/UL — HIGH (ref 1.8–7.4)
NEUTROPHILS NFR BLD AUTO: 73.7 % — SIGNIFICANT CHANGE UP (ref 43–77)
PLATELET # BLD AUTO: 368 K/UL — SIGNIFICANT CHANGE UP (ref 150–400)
POTASSIUM SERPL-MCNC: 5 MMOL/L — SIGNIFICANT CHANGE UP (ref 3.5–5.3)
POTASSIUM SERPL-SCNC: 5 MMOL/L — SIGNIFICANT CHANGE UP (ref 3.5–5.3)
PROT SERPL-MCNC: 7.4 GM/DL — SIGNIFICANT CHANGE UP (ref 6–8.3)
RBC # BLD: 4.84 M/UL — SIGNIFICANT CHANGE UP (ref 3.8–5.2)
RBC # FLD: 14.1 % — SIGNIFICANT CHANGE UP (ref 10.3–14.5)
RSV RNA NPH QL NAA+NON-PROBE: SIGNIFICANT CHANGE UP
SALICYLATES SERPL-MCNC: <1.7 MG/DL (ref 2.8–20)
SARS-COV-2 RNA SPEC QL NAA+PROBE: SIGNIFICANT CHANGE UP
SODIUM SERPL-SCNC: 138 MMOL/L — SIGNIFICANT CHANGE UP (ref 135–145)
TROPONIN I, HIGH SENSITIVITY RESULT: 3.47 NG/L — SIGNIFICANT CHANGE UP
WBC # BLD: 12.94 K/UL — HIGH (ref 3.8–10.5)
WBC # FLD AUTO: 12.94 K/UL — HIGH (ref 3.8–10.5)

## 2022-09-05 PROCEDURE — 84484 ASSAY OF TROPONIN QUANT: CPT

## 2022-09-05 PROCEDURE — 74177 CT ABD & PELVIS W/CONTRAST: CPT | Mod: 26,MA

## 2022-09-05 PROCEDURE — 80178 ASSAY OF LITHIUM: CPT

## 2022-09-05 PROCEDURE — 96374 THER/PROPH/DIAG INJ IV PUSH: CPT

## 2022-09-05 PROCEDURE — 74177 CT ABD & PELVIS W/CONTRAST: CPT | Mod: MA

## 2022-09-05 PROCEDURE — 83735 ASSAY OF MAGNESIUM: CPT

## 2022-09-05 PROCEDURE — 99284 EMERGENCY DEPT VISIT MOD MDM: CPT | Mod: 25

## 2022-09-05 PROCEDURE — 0241U: CPT

## 2022-09-05 PROCEDURE — 84702 CHORIONIC GONADOTROPIN TEST: CPT

## 2022-09-05 PROCEDURE — 80307 DRUG TEST PRSMV CHEM ANLYZR: CPT

## 2022-09-05 PROCEDURE — 85025 COMPLETE CBC W/AUTO DIFF WBC: CPT

## 2022-09-05 PROCEDURE — 99285 EMERGENCY DEPT VISIT HI MDM: CPT

## 2022-09-05 PROCEDURE — 36415 COLL VENOUS BLD VENIPUNCTURE: CPT

## 2022-09-05 PROCEDURE — 80053 COMPREHEN METABOLIC PANEL: CPT

## 2022-09-05 RX ORDER — SODIUM CHLORIDE 9 MG/ML
1000 INJECTION INTRAMUSCULAR; INTRAVENOUS; SUBCUTANEOUS ONCE
Refills: 0 | Status: COMPLETED | OUTPATIENT
Start: 2022-09-05 | End: 2022-09-05

## 2022-09-05 RX ORDER — ACETAMINOPHEN 500 MG
1000 TABLET ORAL ONCE
Refills: 0 | Status: COMPLETED | OUTPATIENT
Start: 2022-09-05 | End: 2022-09-05

## 2022-09-05 RX ADMIN — SODIUM CHLORIDE 2000 MILLILITER(S): 9 INJECTION INTRAMUSCULAR; INTRAVENOUS; SUBCUTANEOUS at 02:19

## 2022-09-05 RX ADMIN — Medication 400 MILLIGRAM(S): at 02:20

## 2022-09-05 NOTE — ED PROVIDER NOTE - OBJECTIVE STATEMENT
22-year-old female with history of autism, depression, borderline personality disorder, diabetes presents with complaints of lower abdominal pain 24 hours ago that has now moved to her lower back that began on the last day of her current menstrual period.  Patient notes that she had heavy bleeding during this period and started having severe cramping but that resolved.  Patient notes severe back pain is not worse with movement.  Patient denies any urinary symptoms at this time patient also states that she has generalized weakness and increased depression.  Patient notes that she lives at a group home and feels increased stress because she does not like the people that she lives with.  Patient states that she feels like she could hurt someone but is not necessarily homicidal.  Patient states that she feels increasingly depressed and has thoughts that she might harm herself but does not act on them.

## 2022-09-05 NOTE — ED ADULT NURSE REASSESSMENT NOTE - NS ED NURSE REASSESS COMMENT FT1
Patient asleep in bed but easily aroused. She is alert and appears to be in no acute distress. No complaint voiced at this time. Safety precautions in place, nursing care ongoing.  Bertha

## 2022-09-05 NOTE — ED PROVIDER NOTE - MDM ORDERS SUBMITTED SELECTION
Alert and oriented, no focal deficits, no motor or sensory deficits. Labs/Imaging Studies/Medications

## 2022-09-05 NOTE — ED PROVIDER NOTE - PATIENT PORTAL LINK FT
You can access the FollowMyHealth Patient Portal offered by [facility] by registering at the following website: [email or web address]. By joining [employer]’s FollowMyHealth portal, you will also be able to view your health information using other applications (apps) compatible with our system.

## 2022-09-05 NOTE — ED PROVIDER NOTE - NSFOLLOWUPINSTRUCTIONS_ED_ALL_ED_FT
Back Pain    Back pain is very common in adults. The cause of back pain is rarely dangerous and the pain often gets better over time. The cause of your back pain may not be known and may include strain of muscles or ligaments, degeneration of the spinal disks, or arthritis. Occasionally the pain may radiate down your leg(s). Over-the-counter medicines to reduce pain and inflammation are often the most helpful. Stretching and remaining active frequently helps the healing process.     SEEK IMMEDIATE MEDICAL CARE IF YOU HAVE ANY OF THE FOLLOWING SYMPTOMS: bowel or bladder control problems, unusual weakness or numbness in your arms or legs, nausea or vomiting, abdominal pain, fever, dizziness/lightheadedness.    Anxiety    WHAT YOU NEED TO KNOW:    Anxiety is a condition that causes you to feel extremely worried or nervous. The feelings are so strong that they can cause problems with your daily activities or sleep. Anxiety may be triggered by something you fear, or it may happen without a cause. Family or work stress, smoking, caffeine, and alcohol can increase your risk for anxiety. Certain medicines or health conditions can also increase your risk. Anxiety can become a long-term condition if it is not managed or treated.     DISCHARGE INSTRUCTIONS:    Call 911 if:     You have chest pain, tightness, or heaviness that may spread to your shoulders, arms, jaw, neck, or back.      You feel like hurting yourself or someone else.     Contact your healthcare provider if:     Your symptoms get worse or do not get better with treatment.      Your anxiety keeps you from doing your regular daily activities.      You have new symptoms since your last visit.      You have questions or concerns about your condition or care.    Medicines:     Medicines may be given to help you feel more calm and relaxed, and decrease your symptoms.      Take your medicine as directed. Contact your healthcare provider if you think your medicine is not helping or if you have side effects. Tell him of her if you are allergic to any medicine. Keep a list of the medicines, vitamins, and herbs you take. Include the amounts, and when and why you take them. Bring the list or the pill bottles to follow-up visits. Carry your medicine list with you in case of an emergency.    Follow up with your healthcare provider within 2 weeks or as directed: Write down your questions so you remember to ask them during your visits.    Manage anxiety:     Talk to someone about your anxiety. Your healthcare provider may suggest counseling. Cognitive behavioral therapy can help you understand and change how you react to events that trigger your symptoms. You might feel more comfortable talking with a friend or family member about your anxiety. Choose someone you know will be supportive and encouraging.      Find ways to relax. Activities such as exercise, meditation, or listening to music can help you relax. Spend time with friends, or do things you enjoy.      Practice deep breathing. Deep breathing can help you relax when you feel anxious. Focus on taking slow, deep breaths several times a day, or during an anxiety attack. Breathe in through your nose and out through your mouth.       Create a regular sleep routine. Regular sleep can help you feel calmer during the day. Go to sleep and wake up at the same times every day. Do not watch television or use the computer right before bed. Your room should be comfortable, dark, and quiet.       Eat a variety of healthy foods. Healthy foods include fruits, vegetables, low-fat dairy products, lean meats, fish, whole-grain breads, and cooked beans. Healthy foods can help you feel less anxious and have more energy.      Exercise regularly. Exercise can increase your energy level. Exercise may also lift your mood and help you sleep better. Your healthcare provider can help you create an exercise plan.      Do not smoke. Nicotine and other chemicals in cigarettes and cigars can increase anxiety. Ask your healthcare provider for information if you currently smoke and need help to quit. E-cigarettes or smokeless tobacco still contain nicotine. Talk to your healthcare provider before you use these products.       Do not have caffeine. Caffeine can make your symptoms worse. Do not have foods or drinks that are meant to increase your energy level.      Limit or do not drink alcohol. Ask your healthcare provider if alcohol is safe for you. You may not be able to drink alcohol if you take certain anxiety or depression medicines. Limit alcohol to 1 drink per day if you are a woman. Limit alcohol to 2 drinks per day if you are a man. A drink of alcohol is 12 ounces of beer, 5 ounces of wine, or 1½ ounces of liquor.       Do not use drugs. Drugs can make your anxiety worse. It can also make anxiety hard to manage. Talk to your healthcare provider if you use drugs and want help to quit.

## 2022-09-05 NOTE — ED PROVIDER NOTE - PROGRESS NOTE DETAILS
On reevaluation, patient states that she feels better with her symptoms resolved.  Patient's denies having any suicidal or homicidal thoughts.  Patient states that she just felt anxious and upset about the situation but never had any thoughts of harming herself or other people.  At this point I feel it is safe to discharge the patient back to her group home setting and she can follow-up with her psychiatrist.

## 2022-09-05 NOTE — ED PROVIDER NOTE - CLINICAL SUMMARY MEDICAL DECISION MAKING FREE TEXT BOX
22-year-old female with history of autism, borderline personality disorder, diabetes presents with lower abdominal and back pain.  Patient also complains of weakness, patient initially was states that she has bouts of harming others but retracted that statement and states that this pain she is frustrated with her living situation.  Patient does not have any red flags concerning for cauda equina, epidural abscess or epidural hematoma.  Plan: Pain control as needed IV fluids, CBC, CMP, urinalysis and reassess

## 2022-09-05 NOTE — ED ADULT TRIAGE NOTE - CHIEF COMPLAINT QUOTE
pt BIBA from facility c/o generalized weakness, nausea, and back pain for a few days. denies fevers, chills.

## 2022-09-17 ENCOUNTER — EMERGENCY (EMERGENCY)
Facility: HOSPITAL | Age: 22
LOS: 0 days | Discharge: HOME | End: 2022-09-17
Attending: STUDENT IN AN ORGANIZED HEALTH CARE EDUCATION/TRAINING PROGRAM
Payer: COMMERCIAL

## 2022-09-17 VITALS
DIASTOLIC BLOOD PRESSURE: 66 MMHG | TEMPERATURE: 98 F | HEIGHT: 66 IN | SYSTOLIC BLOOD PRESSURE: 111 MMHG | OXYGEN SATURATION: 100 % | WEIGHT: 165.35 LBS | HEART RATE: 80 BPM | RESPIRATION RATE: 18 BRPM

## 2022-09-17 VITALS
DIASTOLIC BLOOD PRESSURE: 70 MMHG | TEMPERATURE: 98 F | RESPIRATION RATE: 18 BRPM | HEART RATE: 76 BPM | OXYGEN SATURATION: 100 % | SYSTOLIC BLOOD PRESSURE: 115 MMHG

## 2022-09-17 DIAGNOSIS — Z79.84 LONG TERM (CURRENT) USE OF ORAL HYPOGLYCEMIC DRUGS: ICD-10-CM

## 2022-09-17 DIAGNOSIS — Z98.890 OTHER SPECIFIED POSTPROCEDURAL STATES: Chronic | ICD-10-CM

## 2022-09-17 DIAGNOSIS — Z98.890 OTHER SPECIFIED POSTPROCEDURAL STATES: ICD-10-CM

## 2022-09-17 DIAGNOSIS — F84.0 AUTISTIC DISORDER: ICD-10-CM

## 2022-09-17 DIAGNOSIS — R11.10 VOMITING, UNSPECIFIED: ICD-10-CM

## 2022-09-17 DIAGNOSIS — R10.84 GENERALIZED ABDOMINAL PAIN: ICD-10-CM

## 2022-09-17 DIAGNOSIS — K52.9 NONINFECTIVE GASTROENTERITIS AND COLITIS, UNSPECIFIED: ICD-10-CM

## 2022-09-17 DIAGNOSIS — E11.9 TYPE 2 DIABETES MELLITUS WITHOUT COMPLICATIONS: ICD-10-CM

## 2022-09-17 DIAGNOSIS — F41.1 GENERALIZED ANXIETY DISORDER: ICD-10-CM

## 2022-09-17 DIAGNOSIS — Z90.49 ACQUIRED ABSENCE OF OTHER SPECIFIED PARTS OF DIGESTIVE TRACT: ICD-10-CM

## 2022-09-17 DIAGNOSIS — Z79.01 LONG TERM (CURRENT) USE OF ANTICOAGULANTS: ICD-10-CM

## 2022-09-17 DIAGNOSIS — F60.3 BORDERLINE PERSONALITY DISORDER: ICD-10-CM

## 2022-09-17 LAB
ALBUMIN SERPL ELPH-MCNC: 3.9 G/DL — SIGNIFICANT CHANGE UP (ref 3.3–5)
ALP SERPL-CCNC: 83 U/L — SIGNIFICANT CHANGE UP (ref 40–120)
ALT FLD-CCNC: 20 U/L — SIGNIFICANT CHANGE UP (ref 12–78)
ANION GAP SERPL CALC-SCNC: 4 MMOL/L — LOW (ref 5–17)
APPEARANCE UR: CLEAR — SIGNIFICANT CHANGE UP
AST SERPL-CCNC: 11 U/L — LOW (ref 15–37)
BASOPHILS # BLD AUTO: 0.06 K/UL — SIGNIFICANT CHANGE UP (ref 0–0.2)
BASOPHILS NFR BLD AUTO: 0.3 % — SIGNIFICANT CHANGE UP (ref 0–2)
BILIRUB SERPL-MCNC: 0.2 MG/DL — SIGNIFICANT CHANGE UP (ref 0.2–1.2)
BILIRUB UR-MCNC: NEGATIVE — SIGNIFICANT CHANGE UP
BUN SERPL-MCNC: 18 MG/DL — SIGNIFICANT CHANGE UP (ref 7–23)
CALCIUM SERPL-MCNC: 9.8 MG/DL — SIGNIFICANT CHANGE UP (ref 8.5–10.1)
CHLORIDE SERPL-SCNC: 110 MMOL/L — HIGH (ref 96–108)
CO2 SERPL-SCNC: 27 MMOL/L — SIGNIFICANT CHANGE UP (ref 22–31)
COLOR SPEC: YELLOW — SIGNIFICANT CHANGE UP
CREAT SERPL-MCNC: 0.98 MG/DL — SIGNIFICANT CHANGE UP (ref 0.5–1.3)
DIFF PNL FLD: NEGATIVE — SIGNIFICANT CHANGE UP
EGFR: 83 ML/MIN/1.73M2 — SIGNIFICANT CHANGE UP
EOSINOPHIL # BLD AUTO: 0.14 K/UL — SIGNIFICANT CHANGE UP (ref 0–0.5)
EOSINOPHIL NFR BLD AUTO: 0.8 % — SIGNIFICANT CHANGE UP (ref 0–6)
GLUCOSE SERPL-MCNC: 96 MG/DL — SIGNIFICANT CHANGE UP (ref 70–99)
GLUCOSE UR QL: NEGATIVE — SIGNIFICANT CHANGE UP
HCG SERPL-ACNC: <1 MIU/ML — SIGNIFICANT CHANGE UP
HCT VFR BLD CALC: 40.5 % — SIGNIFICANT CHANGE UP (ref 34.5–45)
HGB BLD-MCNC: 12.3 G/DL — SIGNIFICANT CHANGE UP (ref 11.5–15.5)
IMM GRANULOCYTES NFR BLD AUTO: 0.3 % — SIGNIFICANT CHANGE UP (ref 0–0.9)
KETONES UR-MCNC: NEGATIVE — SIGNIFICANT CHANGE UP
LEUKOCYTE ESTERASE UR-ACNC: ABNORMAL
LIDOCAIN IGE QN: 140 U/L — SIGNIFICANT CHANGE UP (ref 73–393)
LYMPHOCYTES # BLD AUTO: 0.92 K/UL — LOW (ref 1–3.3)
LYMPHOCYTES # BLD AUTO: 5.3 % — LOW (ref 13–44)
MCHC RBC-ENTMCNC: 24.9 PG — LOW (ref 27–34)
MCHC RBC-ENTMCNC: 30.4 GM/DL — LOW (ref 32–36)
MCV RBC AUTO: 82 FL — SIGNIFICANT CHANGE UP (ref 80–100)
MONOCYTES # BLD AUTO: 0.47 K/UL — SIGNIFICANT CHANGE UP (ref 0–0.9)
MONOCYTES NFR BLD AUTO: 2.7 % — SIGNIFICANT CHANGE UP (ref 2–14)
NEUTROPHILS # BLD AUTO: 15.73 K/UL — HIGH (ref 1.8–7.4)
NEUTROPHILS NFR BLD AUTO: 90.6 % — HIGH (ref 43–77)
NITRITE UR-MCNC: NEGATIVE — SIGNIFICANT CHANGE UP
PH UR: 5 — SIGNIFICANT CHANGE UP (ref 5–8)
PLATELET # BLD AUTO: 345 K/UL — SIGNIFICANT CHANGE UP (ref 150–400)
POTASSIUM SERPL-MCNC: 4.4 MMOL/L — SIGNIFICANT CHANGE UP (ref 3.5–5.3)
POTASSIUM SERPL-SCNC: 4.4 MMOL/L — SIGNIFICANT CHANGE UP (ref 3.5–5.3)
PROT SERPL-MCNC: 7.5 GM/DL — SIGNIFICANT CHANGE UP (ref 6–8.3)
PROT UR-MCNC: NEGATIVE — SIGNIFICANT CHANGE UP
RBC # BLD: 4.94 M/UL — SIGNIFICANT CHANGE UP (ref 3.8–5.2)
RBC # FLD: 14.5 % — SIGNIFICANT CHANGE UP (ref 10.3–14.5)
SODIUM SERPL-SCNC: 141 MMOL/L — SIGNIFICANT CHANGE UP (ref 135–145)
SP GR SPEC: 1.02 — SIGNIFICANT CHANGE UP (ref 1.01–1.02)
UROBILINOGEN FLD QL: NEGATIVE — SIGNIFICANT CHANGE UP
WBC # BLD: 17.37 K/UL — HIGH (ref 3.8–10.5)
WBC # FLD AUTO: 17.37 K/UL — HIGH (ref 3.8–10.5)

## 2022-09-17 PROCEDURE — 85025 COMPLETE CBC W/AUTO DIFF WBC: CPT

## 2022-09-17 PROCEDURE — 74176 CT ABD & PELVIS W/O CONTRAST: CPT | Mod: 26,MA

## 2022-09-17 PROCEDURE — 84702 CHORIONIC GONADOTROPIN TEST: CPT

## 2022-09-17 PROCEDURE — 36415 COLL VENOUS BLD VENIPUNCTURE: CPT

## 2022-09-17 PROCEDURE — 96374 THER/PROPH/DIAG INJ IV PUSH: CPT

## 2022-09-17 PROCEDURE — 99284 EMERGENCY DEPT VISIT MOD MDM: CPT | Mod: 25

## 2022-09-17 PROCEDURE — 87086 URINE CULTURE/COLONY COUNT: CPT

## 2022-09-17 PROCEDURE — 83690 ASSAY OF LIPASE: CPT

## 2022-09-17 PROCEDURE — 80053 COMPREHEN METABOLIC PANEL: CPT

## 2022-09-17 PROCEDURE — 74176 CT ABD & PELVIS W/O CONTRAST: CPT | Mod: MA

## 2022-09-17 PROCEDURE — 99284 EMERGENCY DEPT VISIT MOD MDM: CPT | Mod: FS

## 2022-09-17 PROCEDURE — 81001 URINALYSIS AUTO W/SCOPE: CPT

## 2022-09-17 RX ORDER — CIPROFLOXACIN LACTATE 400MG/40ML
1 VIAL (ML) INTRAVENOUS
Qty: 20 | Refills: 0
Start: 2022-09-17 | End: 2022-09-26

## 2022-09-17 RX ORDER — ONDANSETRON 8 MG/1
4 TABLET, FILM COATED ORAL ONCE
Refills: 0 | Status: COMPLETED | OUTPATIENT
Start: 2022-09-17 | End: 2022-09-17

## 2022-09-17 RX ORDER — CIPROFLOXACIN LACTATE 400MG/40ML
500 VIAL (ML) INTRAVENOUS ONCE
Refills: 0 | Status: COMPLETED | OUTPATIENT
Start: 2022-09-17 | End: 2022-09-17

## 2022-09-17 RX ORDER — METRONIDAZOLE 500 MG
1 TABLET ORAL
Qty: 30 | Refills: 0
Start: 2022-09-17 | End: 2022-09-26

## 2022-09-17 RX ORDER — METRONIDAZOLE 500 MG
500 TABLET ORAL ONCE
Refills: 0 | Status: COMPLETED | OUTPATIENT
Start: 2022-09-17 | End: 2022-09-17

## 2022-09-17 RX ADMIN — ONDANSETRON 4 MILLIGRAM(S): 8 TABLET, FILM COATED ORAL at 11:40

## 2022-09-17 RX ADMIN — Medication 500 MILLIGRAM(S): at 14:20

## 2022-09-17 NOTE — ED PROVIDER NOTE - ATTENDING APP SHARED VISIT CONTRIBUTION OF CARE
I, Harley Sampson, DO personally saw the patient with FERNANDA.  I have personally performed a face to face diagnostic evaluation on this patient.  I have reviewed the FERNANDA note and agree with the history, exam, and plan of care, except as noted.  I personally saw the patient and performed a substantive portion of the visit including all aspects of the medical decision making.

## 2022-09-17 NOTE — ED PROVIDER NOTE - CLINICAL SUMMARY MEDICAL DECISION MAKING FREE TEXT BOX
22 year old female that identifies as a male complaining of vomiting started this morning and associated abdominal tenderness, diarrhea for few weeks, normal vitals, expect acute gastroenteritis vs coelitis. labs nausea control, CT scan, ua, pregnancy test and reassess.

## 2022-09-17 NOTE — ED PROVIDER NOTE - OBJECTIVE STATEMENT
22 year old female that identifies as a male with PMHx of autism, appendectomy, blood clot s/p open heart surgery presents to the ED complaining of vomiting that started 10am. Pt felt nausea, vomited 2x. Pt felt he was gonna faint after vomiting episode. Reports feeling normal yesterday. pt trying to get pregnant for 3 years, including last month. Denies fever, chills, difficulty urinating. Pt notes some generalized achy abdominal pain and diarrhea for a few weeks. Pt takes daily Eliquis, metformin. last meal last night. pt has had sick contacts in past 2 weeks. no drug or alcohol use. denies eating today.

## 2022-09-17 NOTE — ED PROVIDER NOTE - PATIENT PORTAL LINK FT
You can access the FollowMyHealth Patient Portal offered by [facility] by registering at the following website: [email or web address]. By joining [employer]’s FollowMyHealth portal, you will also be able to view your health information using other applications (apps) compatible with our system.

## 2022-09-17 NOTE — ED PROVIDER NOTE - PRO INTERPRETER NEED 2
November 15, 2017      Milli Thomas MD  [address] [place] [id number]           [facility] - Maternal Fetal Med  [address] [facility] [address]  Phone: [phone]          Patient: Cora Garcia   MR Number: [id number]   YOB: 1985   Date of Visit: 11/14/2017       Dear Dr. Milli Thomas:    Thank you for referring Cora Garcia to me for evaluation. Attached you will find relevant portions of my assessment and plan of care.    If you have questions, please do not hesitate to call me. I look forward to following Cora Garcia along with you.    Sincerely,    Diane Howe MD    Enclosure  CC:  No Recipients    If you would like to receive this communication electronically, please contact [email or web address] or [phone] to request more information on [employer] Link access.    For providers and/or their staff who would like to refer a patient to [facility], please contact us through our one-stop-shop provider referral line, [facility], at [phone].    If you feel you have received this communication in error or would no longer like to receive these types of communications, please e-mail [email or web address]         
English

## 2022-09-17 NOTE — ED STATDOCS - PROGRESS NOTE DETAILS
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 -Andry Goetz PA-C

## 2022-09-17 NOTE — ED PROVIDER NOTE - PROGRESS NOTE DETAILS
CT with evidence of colitis and WBC of 17. WIll treat with abx and fd/c to f/u with GI. Pt aware and agrees with plan. -Andry Goetz PA-C 21 yo female, would like to be address as he or Saad, with a PMH of autism, depression, R ventricular clot in 2020 on eliquis, [facility] that started thi smorning. Pt states abd pain Is diffuse and also wasn't sure if she was pregnant because she has been sexually active with her partner and LMP was 9/1/22. WIll check labs, CT, UA and reeval. -Andry Goetz PA-C  -Andry Goetz PA-C.

## 2022-09-17 NOTE — ED STATDOCS - NSTIMEPROVIDERCAREINITIATE_GEN_ER
"  CHIEF COMPLAINT   It was my pleasure to see Kimani Juarez who is a 73 year old male for follow-up of elevated PSA.      HPI   Kimani Juarez is a very pleasant 73 year old male who presents with a history of Elevated PSA. He has previously been followed by Dr. Wright. Had PSA rise in the past to 7.8. 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 More recently, had episode of acute prostatitis with PSA flare to 129 at that time. Here for PSA follow-up. No ongoing symptoms of prostatitis, but does have significant BPH with obstructive urinary symptoms. Takes finasteride and tamsulosin at baseline. Remains bothered by his weak stream and difficulty emptying. Is interested in possible bladder outlet procedure. PSA recheck back down to 9.4.    PSA   6/25/2021 - 9.40  1/2/2021 - 129 (prostatitis)  2/4/2019 - 7.80  8/9/2018 - 7.50  8/7/2017 - 6.40    MRI Prostate 8/18/2018  IMPRESSION:   1. Based on the most suspicious abnormality, this exam is  characterized as PIRADS 2 - Low probability.  Clinically significant  cancer is unlikely to be present.    2. Indeterminate bilateral enhancing foci at the intertrochanteric  lines with mixed T2 signal, measuring 8 and 5 mm on the left and  right, respectively. Appearance suggests benign etiology such as  enchondroma, or liposclerosing myxofibrous tumors. Consider follow-up  radiographs for further evaluation.  3. Patient motion degrades image quality on multiple sequences.    PHYSICAL EXAM  Patient is a 73 year old  male   Vitals: Blood pressure 120/80, pulse 62, height 1.778 m (5' 10\"), weight 79.4 kg (175 lb), SpO2 96 %.  General Appearance Adult: Body mass index is 25.11 kg/m .  Alert, no acute distress, oriented  Lungs: no respiratory distress, or pursed lip breathing  Abdomen: soft, nontender, no organomegaly or masses  Back: no CVAT  Neuro: Alert, oriented, speech and mentation normal  Psych: affect and mood normal    Outside and Past Medical " records:  Review of prior external note(s) from - [facility] information from [place] reviewed  Review of the result(s) of each unique test - PSA, MRI, UA, BMP    ASSESSMENT and PLAN  73 year old male with history of elevated PSA. This has been high for several years and currently at 9.4 (on finasteride). Previous MRI with PIRADS 2 lesion. At this point, we discussed both his worsening symptoms of BPH with LUTS, as well as his elevated PSA. Given his PSA remains rather high despite finasteride, will plan MRI prostate. Will also plan to recheck his PSA in about 1 month, as it may still be declining from the episode of acute prostatitis. Will plan cystoscopy around that time to better assess his anatomy and candidacy for possible TURP. Pending MRI and/or PSA findings, will also consider possible prostate biopsy. Will renew his medications today as well.    Renew finasteride and tamsulosin  MRI prostate  Follow-up 1 month with PSA recheck and office cystoscopy    25 minutes spent on the date of the encounter doing chart review, history and exam, documentation and further activities as noted above.    Marky Jarrett MD  Urology  [facility]     17-Sep-2022 10:52

## 2022-09-17 NOTE — ED ADULT TRIAGE NOTE - CHIEF COMPLAINT QUOTE
Pt BIBEMS from group home with nausea and vomiting starting at 5 AM, as per pt blood was noted in vomit. Pt states that there is a change of pregnancy. Pt would like to be called "Saad".

## 2022-09-17 NOTE — ED PROVIDER NOTE - NSFOLLOWUPINSTRUCTIONS_ED_ALL_ED_FT
Colitis       Colitis is a condition in which the colon is inflamed. It can cause diarrhea, blood in the stool, and abdominal pain. Colitis can last a short time (be acute), or it may last a long time (become chronic).      What are the causes?    This condition may be caused by:  •Infections from viruses or bacteria.      •A reaction to medicine.      •Certain autoimmune diseases, such as Crohn's disease or ulcerative colitis.      •Radiation treatment.      •Decreased blood flow to the bowel (ischemia).        What are the signs or symptoms?    Symptoms of this condition include:  •Diarrhea, blood in the stool, or black, tarry stool.      •Pain in the joints or abdominal pain.      •Fever or fatigue.      •Vomiting.      •Weight loss.      •Bloating.      •Having fewer bowel movements than usual.      •A strong and sudden urge to have a bowel movement.      •Feeling like the bowel is not empty after a bowel movement.        How is this diagnosed?    This condition may be diagnosed based on a stool test and a blood test. You may also have other tests, such as:  •X-rays.      •CT scan.      •Colonoscopy.      •Endoscopy.      •Biopsy.        How is this treated?    Treatment for this condition depends on the cause. This condition may be treated with:  •Steps to rest the bowel, such as not eating or drinking for a period of time.      •Fluids that are given through an IV.      •Medicine for pain and diarrhea.      •Antibiotic medicines.      •Cortisone medicines.      •Surgery.        Follow these instructions at home:      Eating and drinking      •Follow instructions from your health care provider about eating or drinking restrictions.      •Drink enough fluid to keep your urine pale yellow.      •Work with a dietitian to determine whether certain foods cause your condition to flare up.      •Avoid foods or drinks that cause flare-ups.      •Eat a well-balanced diet.      General instructions     •If you were prescribed an antibiotic medicine, take it as told by your health care provider. Do not stop taking the antibiotic even if you start to feel better.      •Take over-the-counter and prescription medicines only as told by your health care provider.      •Keep all follow-up visits. This is important.        Contact a health care provider if:    •Your symptoms do not go away.      •You develop new symptoms.        Get help right away if:    •You have a fever that does not go away with treatment.      •You develop chills.      •You have extreme weakness, fainting, or dehydration.      •You vomit repeatedly.      •You develop severe pain in your abdomen.      •You pass bloody or tarry stool.        Summary    •Colitis is a condition in which the colon is inflamed. Colitis can last a short time (be acute), or it may last a long time (become chronic).      •Treatment for this condition depends on the cause and may include resting the bowel, taking medicines, or having surgery.      •If you were prescribed an antibiotic medicine, take it as told by your health care provider. Do not stop taking the antibiotic even if you start to feel better.      •Get help right away if you develop severe pain in your abdomen.      •Keep all follow-up visits. This is important.

## 2022-09-19 LAB
CULTURE RESULTS: SIGNIFICANT CHANGE UP
SPECIMEN SOURCE: SIGNIFICANT CHANGE UP

## 2022-09-20 NOTE — ED PROVIDER NOTE - SKIN NEGATIVE STATEMENT, MLM
Spoke with pt mother (Chloé); Informed her pt would have to make a appt so provider can recommend best ointment or cream; MA scheduled appt with next available provider; pt verbalized understanding; MA scheduled appt for 9/21 /LD    
no abrasions, no jaundice, no lesions, no pruritis, and no rashes.

## 2022-10-18 ENCOUNTER — EMERGENCY (EMERGENCY)
Facility: HOSPITAL | Age: 22
LOS: 0 days | Discharge: HOME | End: 2022-10-18
Attending: EMERGENCY MEDICINE
Payer: COMMERCIAL

## 2022-10-18 ENCOUNTER — EMERGENCY (EMERGENCY)
Facility: HOSPITAL | Age: 22
LOS: 0 days | Discharge: LEFT BEFORE DISCHARGE | End: 2022-10-18
Attending: EMERGENCY MEDICINE
Payer: COMMERCIAL

## 2022-10-18 VITALS
TEMPERATURE: 99 F | HEART RATE: 69 BPM | WEIGHT: 164.91 LBS | DIASTOLIC BLOOD PRESSURE: 64 MMHG | RESPIRATION RATE: 18 BRPM | OXYGEN SATURATION: 100 % | SYSTOLIC BLOOD PRESSURE: 123 MMHG | HEIGHT: 66 IN

## 2022-10-18 VITALS
DIASTOLIC BLOOD PRESSURE: 62 MMHG | WEIGHT: 160.06 LBS | TEMPERATURE: 98 F | HEIGHT: 66 IN | HEART RATE: 78 BPM | OXYGEN SATURATION: 100 % | RESPIRATION RATE: 15 BRPM | SYSTOLIC BLOOD PRESSURE: 110 MMHG

## 2022-10-18 VITALS
RESPIRATION RATE: 18 BRPM | DIASTOLIC BLOOD PRESSURE: 73 MMHG | HEART RATE: 70 BPM | TEMPERATURE: 98 F | SYSTOLIC BLOOD PRESSURE: 112 MMHG | OXYGEN SATURATION: 100 %

## 2022-10-18 DIAGNOSIS — Z79.84 LONG TERM (CURRENT) USE OF ORAL HYPOGLYCEMIC DRUGS: ICD-10-CM

## 2022-10-18 DIAGNOSIS — Z53.29 PROCEDURE AND TREATMENT NOT CARRIED OUT BECAUSE OF PATIENT'S DECISION FOR OTHER REASONS: ICD-10-CM

## 2022-10-18 DIAGNOSIS — F32.A DEPRESSION, UNSPECIFIED: ICD-10-CM

## 2022-10-18 DIAGNOSIS — F41.1 GENERALIZED ANXIETY DISORDER: ICD-10-CM

## 2022-10-18 DIAGNOSIS — Z86.2 PERSONAL HISTORY OF DISEASES OF THE BLOOD AND BLOOD-FORMING ORGANS AND CERTAIN DISORDERS INVOLVING THE IMMUNE MECHANISM: ICD-10-CM

## 2022-10-18 DIAGNOSIS — Z86.718 PERSONAL HISTORY OF OTHER VENOUS THROMBOSIS AND EMBOLISM: ICD-10-CM

## 2022-10-18 DIAGNOSIS — R07.89 OTHER CHEST PAIN: ICD-10-CM

## 2022-10-18 DIAGNOSIS — J00 ACUTE NASOPHARYNGITIS [COMMON COLD]: ICD-10-CM

## 2022-10-18 DIAGNOSIS — Z13.9 ENCOUNTER FOR SCREENING, UNSPECIFIED: ICD-10-CM

## 2022-10-18 DIAGNOSIS — Z98.890 OTHER SPECIFIED POSTPROCEDURAL STATES: Chronic | ICD-10-CM

## 2022-10-18 DIAGNOSIS — E11.9 TYPE 2 DIABETES MELLITUS WITHOUT COMPLICATIONS: ICD-10-CM

## 2022-10-18 DIAGNOSIS — R09.81 NASAL CONGESTION: ICD-10-CM

## 2022-10-18 DIAGNOSIS — F84.0 AUTISTIC DISORDER: ICD-10-CM

## 2022-10-18 DIAGNOSIS — F60.3 BORDERLINE PERSONALITY DISORDER: ICD-10-CM

## 2022-10-18 DIAGNOSIS — Z20.822 CONTACT WITH AND (SUSPECTED) EXPOSURE TO COVID-19: ICD-10-CM

## 2022-10-18 PROCEDURE — 99283 EMERGENCY DEPT VISIT LOW MDM: CPT | Mod: 25

## 2022-10-18 PROCEDURE — 71045 X-RAY EXAM CHEST 1 VIEW: CPT | Mod: 26

## 2022-10-18 PROCEDURE — 99283 EMERGENCY DEPT VISIT LOW MDM: CPT

## 2022-10-18 PROCEDURE — 71045 X-RAY EXAM CHEST 1 VIEW: CPT

## 2022-10-18 PROCEDURE — 0241U: CPT

## 2022-10-18 PROCEDURE — 99284 EMERGENCY DEPT VISIT MOD MDM: CPT

## 2022-10-18 RX ORDER — SODIUM CHLORIDE 9 MG/ML
1000 INJECTION INTRAMUSCULAR; INTRAVENOUS; SUBCUTANEOUS ONCE
Refills: 0 | Status: DISCONTINUED | OUTPATIENT
Start: 2022-10-18 | End: 2022-10-18

## 2022-10-18 RX ORDER — KETOROLAC TROMETHAMINE 30 MG/ML
30 SYRINGE (ML) INJECTION ONCE
Refills: 0 | Status: COMPLETED | OUTPATIENT
Start: 2022-10-18 | End: 2022-10-18

## 2022-10-18 NOTE — ED PROVIDER NOTE - NSFOLLOWUPINSTRUCTIONS_ED_ALL_ED_FT
you are signing out against medical advice (AMA).   you understand the risks of AMA including but not limited to death.   please follow up with your doctor ASAP.   return to ED for any concerns

## 2022-10-18 NOTE — ED PROVIDER NOTE - PATIENT PORTAL LINK FT
You can access the FollowMyHealth Patient Portal offered by [facility] by registering at the following website: [email or web address]. By joining [employer]’s FollowMyHealth portal, you will also be able to view your health information using other applications (apps) compatible with our system.

## 2022-10-18 NOTE — ED ADULT NURSE NOTE - NSIMPLEMENTINTERV_GEN_ALL_ED
Implemented All Fall Risk Interventions:  [place] to call system. Call bell, personal items and telephone within reach. Instruct patient to call for assistance. Room bathroom lighting operational. Non-slip footwear when patient is off stretcher. Physically safe environment: no spills, clutter or unnecessary equipment. Stretcher in lowest position, wheels locked, appropriate side rails in place. Provide visual cue, wrist band, yellow gown, etc. Monitor gait and stability. Monitor for mental status changes and reorient to person, place, and time. Review medications for side effects contributing to fall risk. Reinforce activity limits and safety measures with patient and family.

## 2022-10-18 NOTE — ED PROVIDER NOTE - CLINICAL SUMMARY MEDICAL DECISION MAKING FREE TEXT BOX
Pt has no complaints, she would like to go back to her group home, we contacted them and she will be dc with f/u and return precautions.

## 2022-10-18 NOTE — ED PROVIDER NOTE - PROGRESS NOTE DETAILS
pt states feels better and does not want to wait for labs.   pt understands the risks of AMA including but not limited to death.   states will f/u

## 2022-10-18 NOTE — ED ADULT NURSE NOTE - OBJECTIVE STATEMENT
Pt alert and responsive. Pt c/o chest pain started Friday. Pt comes from group home. Pt reported she saw another resident have a sz on Friday and thinks this may have caused her chest pain. Pt reports Pt alert and responsive. Pt c/o chest pain started Friday. Pt comes from group home. Pt reported she saw another resident have a sz on Friday and thinks this may have caused her chest pain. Pt reports that this resident is her boyfriend wants to be near him. No s/o distress/discomfort noted, breathing is even and unlabored.

## 2022-10-18 NOTE — ED PROVIDER NOTE - DOMESTIC TRAVEL HIGH RISK QUESTION
Patient requesting testing for COVID-19. On exam, Patient appears well and in no apparent distress. Nasopharyngeal PCR has been obtained, and Patient has been guided on how to obtain their results.  General COVID-19 discharge instructions have been given to the Patient. Patient agrees to receiving results electronically. No

## 2022-10-18 NOTE — ED ADULT NURSE REASSESSMENT NOTE - NS ED NURSE REASSESS COMMENT FT1
Pt refused lab work, stated her "chest pain went away and I feel much better now, I want to leave." MD Dc made aware. Pt signed AMA papers.

## 2022-10-18 NOTE — ED PROVIDER NOTE - PATIENT PORTAL LINK FT
You can access the FollowMyHealth Patient Portal offered by [facility] by registering at the following website: [email or web address]. By joining [employer]’s FollowMyHealth portal, you will also be able to view your health information using other applications (apps) compatible with our system.

## 2022-10-18 NOTE — ED PROVIDER NOTE - OBJECTIVE STATEMENT
23 y/o female in ED c/o cp, x 3 days.   pt states h/o DVT and has stopped her Eliquis as per her doctor.   pt denies any fever, HA, sob, n/v/d/abd pain.  worse with palpation of chest

## 2022-10-18 NOTE — ED ADULT NURSE NOTE - OBJECTIVE STATEMENT
Pt presents to ED from group home. pt has no complaints. spoke with pt's advocate who stated that the pt "fell onto the floor and we have to call 911 when a resident falls". pt denies LOC, states "I don't know why I'm here". pt hx of autism

## 2022-10-18 NOTE — ED ADULT NURSE NOTE - COMFORT/ACCEPTABLE PAIN LEVEL (0-10)
REVIEW OF SYSTEMS:   GENERAL:  Patient denies fevers, chills, night sweats, anorexia, weight loss, but complains of:  excessive fatigue  EYES:  Patient denies significant visual difficulties, diplopia  ENT/MOUTH:  Patient denies problems with hearing, sore throat, mucositis or mouth sores, sinus drainage  ENDOCRINE:  Patient denies diabetes, thyroid disease, hormone replacement, hot flashes or night sweats  HEMATOLOGIC/LYMPHATIC: Patient denies easy bruising or bleeding, tender or palpable lymph nodes  BREASTS: Patient denies abnormal masses of breast, nipple discharge, pain  RESPIRATORY:  Patient denies  dyspnea on exertion, chest pain, hemoptysis, but complains of:  Cough am and pm  CARDIOVASCULAR:  Patient denies anginal chest pain, palpitations, orthopnea, peripheral edema   GASTROINTESTINAL: Patient denies nausea, vomiting, diarrhea, GI bleeding, constipation, change in bowel habits, heartburn, early satiety   (MALE): Patient denies hematuria, dysuria, increased frequency, urgency, hesitancy, incontinence  MUSCULOSKELETAL:  Patient denies  joint pain, swelling or redness, decreased range of motion  SKIN:  Patient denies chronic rashes, inflammation, ulcerations or skin changes  NEUROLOGIC:  Patient denies headache, blurred vision, areas of focal weakness or numbness, abnormal gait, sensory problems  PSYCHIATRIC: Patient denies insomnia, depression, trang or mood swings, psychotropic drugs     0

## 2022-10-18 NOTE — ED PROVIDER NOTE - NS ED ROS FT
Constitutional: No fever or chills  Eyes: No visual changes  HEENT: No throat pain, +congestion  CV: No chest pain  Resp: No SOB no cough  GI: No abd pain, nausea or vomiting  : No dysuria  MSK: No musculoskeletal pain  Skin: No rash  Neuro: No headache

## 2022-10-18 NOTE — ED PROVIDER NOTE - NS ED SCRIBE STATEMENT
59-year-old female with a past medical history of Hashimoto's thyroiditis and asthma presents to the ER today for evaluation of upper URI-like symptoms since Sunday, 1/2/2021. Patient is a physician working for [employer] and is supposed to return to [place] this Friday, but is concerned that she could have contracted COVID-19 and has been unable to find any testing center. She has been vaccinated x3 with [employer] [facility]. She denies any abdominal pain, chest pain, vomiting. Past Medical History:   Diagnosis Date    Asthma     Endocrine disease     [place]       History reviewed. No pertinent surgical history. History reviewed. No pertinent family history. Social History     Socioeconomic History    Marital status: Not on file     Spouse name: Not on file    Number of children: Not on file    Years of education: Not on file    Highest education level: Not on file   Occupational History    Not on file   Tobacco Use    Smoking status: Never Smoker    Smokeless tobacco: Never Used   Substance and Sexual Activity    Alcohol use: Yes    Drug use: Never    Sexual activity: Not on file   Other Topics Concern    Not on file   Social History Narrative    Not on file     Social Determinants of Health     Financial Resource Strain:     Difficulty of Paying Living Expenses: Not on file   Food Insecurity:     Worried About Running Out of Food in the Last Year: Not on file    [place] of Food in the Last Year: Not on file   Transportation Needs:     Lack of Transportation (Medical): Not on file    Lack of Transportation (Non-Medical):  Not on file   Physical Activity:     Days of Exercise per Week: Not on file    Minutes of Exercise per Session: Not on file   Stress:     Feeling of Stress : Not on file   Social Connections:     Frequency of Communication with Friends and Family: Not on file    Frequency of Social Gatherings with Friends and Family: Not on file    Attends Islam Services: Not on file    Active Member of Clubs or Organizations: Not on file    Attends Club or Organization Meetings: Not on file    Marital Status: Not on file   Intimate Partner Violence:     Fear of Current or Ex-Partner: Not on file    Emotionally Abused: Not on file    Physically Abused: Not on file    Sexually Abused: Not on file   Housing Stability:     Unable to Pay for Housing in the Last Year: Not on file    Number of [place] in the Last Year: Not on file    Unstable Housing in the Last Year: Not on file         ALLERGIES: Penicillins and Sulfa (sulfonamide antibiotics)    Review of Systems   Constitutional: Negative for fever. HENT: Positive for congestion. Negative for sore throat. Eyes: Negative for visual disturbance. Respiratory: Positive for cough. Cardiovascular: Negative for palpitations. Gastrointestinal: Negative for vomiting. Genitourinary: Negative for dysuria. Musculoskeletal: Negative for myalgias. Skin: Negative for rash. Neurological: Negative for dizziness. Psychiatric/Behavioral: Negative for dysphoric mood. Vitals:    01/04/22 1538 01/04/22 1539   BP:  (!) 155/106   Pulse: 98    Resp: 18    Temp: 98 °F (36.7 °C)    SpO2: 99%             Physical Exam  Vitals and nursing note reviewed. Constitutional:       General: She is not in acute distress. Appearance: Normal appearance. She is not ill-appearing. Comments: Well-appearing, polite, conversational   HENT:      Head: Normocephalic and atraumatic. Right Ear: External ear normal.      Left Ear: External ear normal.      Nose: Nose normal.      Mouth/Throat:      Mouth: Mucous membranes are moist.      Pharynx: Oropharynx is clear. Eyes:      General: No scleral icterus. Extraocular Movements: Extraocular movements intact. Conjunctiva/sclera: Conjunctivae normal.      Pupils: Pupils are equal, round, and reactive to light.    Cardiovascular:      Rate and Rhythm: Normal rate and regular rhythm. Pulses: Normal pulses. Heart sounds: Normal heart sounds. Pulmonary:      Effort: Pulmonary effort is normal. No tachypnea. Breath sounds: Normal breath sounds and air entry. No rhonchi or rales. Abdominal:      General: Abdomen is flat. Bowel sounds are normal.      Palpations: Abdomen is soft. Musculoskeletal:         General: Normal range of motion. Cervical back: Normal range of motion and neck supple. No rigidity. No muscular tenderness. Skin:     General: Skin is warm and dry. Coloration: Skin is not pale. Neurological:      General: No focal deficit present. Mental Status: She is alert and oriented to person, place, and time. Psychiatric:         Mood and Affect: Mood normal.         Behavior: Behavior normal.         Thought Content: Thought content normal.         Judgment: Judgment normal.          MDM      VITAL SIGNS:  Patient Vitals for the past 4 hrs:   Temp Pulse Resp BP SpO2   01/04/22 1539 -- -- -- (!) 155/106 --   01/04/22 1538 98 °F (36.7 °C) 98 18 -- 99 %         LABS:  Recent Results (from the past 6 hour(s))   COVID-19 RAPID TEST    Collection Time: 01/04/22  3:45 PM   Result Value Ref Range    Specimen source Nasopharyngeal      COVID-19 rapid test Detected (A) NOTD          IMAGING:  No orders to display         Medications During Visit:  Medications - No data to display      DECISION MAKING:  Halle Estrada is a 54 y.o. female who comes in as above. Rapid COVID-19 positive. Discussed symptom control and isolation guidelines. The clinical decision making for this encounter included ordering and interpreting the above diagnostic tests with comparison to prior studies that are within our EMR. Past medical and surgical histories were reviewed, as were records from recent outpatient and emergency department visits. The above results discussed and reviewed with the patient.   Patient verbalized understanding of the care plan, including any changes to current outpatient medication regimen, discussed disease process, symptom control, and follow-up care. Return precautions reviewed. IMPRESSION:  1. COVID        DISPOSITION:  Discharged      Current Discharge Medication List           Follow-up Information     Follow up With Specialties Details Why Contact Info    [facility] [address] [facility]  If symptoms worsen Terrence Gamez [phone]            The patient is asked to follow-up with their primary care provider in the next several days. They are to call tomorrow for an appointment. The patient is asked to return promptly for any increased concerns or worsening of symptoms. They can return to this emergency department or any other emergency department.       Procedures Attending
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
## 2022-10-18 NOTE — ED PROVIDER NOTE - OBJECTIVE STATEMENT
21 y/o female with PMHx of autism, borderline personality disorder, DM and h/o blood clots presents to the ED sent in from group home c/o a minor head cold since this morning. Pt was here last night with chest pain, but it subsided before she was seen and she left AMA. Pt states she does not know why she was sent in today. Pt's last menstrual period was 10/3. Denies pain or swelling in legs. Pt was previously on Eliquis two years ago, but is no longer taking it now. Denies SI/HI. 23 y/o female with PMHx of autism, borderline personality disorder, DM and h/o blood clots presents to the ED sent in from group home c/o a minor head cold since this morning. Pt was here last night with chest pain, but it subsided before she was seen and she left AMA. Pt states she does not know why she was sent in today. Pt's last menstrual period was 10/3. Denies pain or swelling in legs. Pt was previously on Eliquis two years ago, but is no longer taking it now. Denies SI/HI. She denied any CP/SOB/abd pain/nausea/vomiting, pt stated she feels perfectly fine.

## 2022-10-18 NOTE — ED ADULT NURSE NOTE - CAS ELECT INFOMATION PROVIDED
CHRONIC DISEASE MANAGEMENT CLINIC FOLLOW-UP VISIT NOTE    Referring provider: Dr. Hager  Supervising provider: Dr. Hager     PRIMARY CARE PHYSICIAN  Mandakini Pokharna, MD    PATIENT'S PREFERRED PHARMACIES  [facility] #[id number] - [address] [place] [facility] [place]  Pretty Darling is a 61 year old year old Black/ female who presents to clinic today for management of diabetes and HTN.     Patient did not bring in her medications or BG meter with her today. She has been out of her HTN meds for the past 4 days since she has a problem with her health insurance. She states that the problem has been resolved and she will pick her meds up today.     Diabetes:   Currently prescribed:  --Jardiance 10mg daily   --Glipizide 10mg BID     Past medications include: stopped Metformin due to GI ADRs, Januvia was prescribed but patient never took due to high cost      Diabetes:       Blood glucose testing frequency: 1-2 times daily   visit: Forgot meter at home but FBGs per memory: 139, 169, 170, 152, 135     visit (3-4 weeks of data):  AM/fastin, 107, 140, 166, 131, 121, 80, 136, 128  After food:182, 164     visit:  7-day ave: 168, 14-day ave: 171, 30-day ave: 163  AM: 194, 170, 100, 198, 154, 198, 184, 118, 146      Hypoglycemia: Did experience symptoms of hypoglycemia when she has low BG.  This incidence occurred because she doesn't have appetite to eat food. Pt was educated about being consistent with meals       Hyperglycemia: Thirst, dry mouth, blurry vision, no polyuria, polyphagia.               Hypertension/ PAD  Compliant with medication.  No dizziness, swelling, chest pains, palpitations but had mild headache two days ago.   Sees Dr. Pang for PAD, recommended high dose aspirin. No bruising/ bleeding.  Last visit was in 2020.  Due for f/u with Dr. Parmar.    · Amlodipine 10 mg daily   · Metoprolol Succinate 50 mg daily   · Clopidogrel 75  mg daily   · Aspirin 325 mg daily (pt is not taking per PCP recommendation. Pt also needs to f/up with Dr. Pang)  · Atorvastatin 40 mg daily   · Lisinopril / HCTZ 20-12.5 mg - stopped in April by PCP  · Irbesartan 300mg daily    Smoking/ Pulm  Current everyday smoker. Occasional shortness of breath. Not using Breo or albuterol - removed from med list per patient's request.  · Buproprion  mg daily  (has not started taking this yet but plans to start)    History of breast cancer  · Anastrozole 1 mg daily- completed    OBJECTIVE  Allergies as of 11/08/2021 - Reviewed 11/01/2021   Allergen Reaction Noted   • Losartan potassium SHORTNESS OF BREATH 08/21/2011   • Latex RASH 03/21/2014   • Lisinopril Cough 04/14/2021   • Rofecoxib Other (See Comments) 10/30/2003   • Tramadol HIVES 08/11/2015       Current Medications    ACCU-CHEK FASTCLIX LANCETS MISC    Use once a day    ALBUTEROL 108 (90 BASE) MCG/ACT INHALER    Inhale 2 puffs into the lungs every 4 hours as needed for Shortness of Breath or Wheezing.    AMLODIPINE (NORVASC) 10 MG TABLET    TAKE 1 TABLET BY MOUTH EVERY DAY    ASPIRIN 325 MG EC TABLET    TAKE 1 TABLET BY MOUTH EVERY DAY    BLOOD GLUCOSE (ACCU-CHEK GUIDE) TEST STRIP    Test blood sugar once a day    BRIMONIDINE (ALPHAGAN) 0.2 % OPHTHALMIC SOLUTION    INSTILL 1 DROP IN BOTH EYES TWICE A DAY    BUDESONIDE-FORMOTEROL (SYMBICORT) 80-4.5 MCG/ACT INHALER    Inhale 2 puffs into the lungs 2 times daily.    BUPROPION XL (WELLBUTRIN XL) 150 MG 24 HR TABLET    Take 1 tablet by mouth daily.    CLOPIDOGREL (PLAVIX) 75 MG TABLET    TAKE 1 TABLET BY MOUTH EVERY DAY    DORZOLAMIDE (TRUSOPT) 2 % OPHTHALMIC SOLUTION    INSTILL 1 DROP INTO BOTH EYES TWICE A DAY    EMPAGLIFLOZIN (JARDIANCE) 10 MG TABLET    Take 1 tablet by mouth daily (before breakfast).    FERROUS SULFATE 325 (65 FE) MG TABLET    Take 1 tablet by mouth daily (with breakfast).    FLUTICASONE (FLONASE) 50 MCG/ACT NASAL SPRAY    Spray 1 spray in each  nostril daily.    IRBESARTAN (AVAPRO) 300 MG TABLET    TAKE 1 TABLET BY MOUTH EVERY DAY AT NIGHT    LANCETS (ACCU-CHEK MULTICLIX) MISC    CHECK 2-3 TIMES A DAY    LATANOPROST (XALATAN) 0.005 % OPHTHALMIC SOLUTION        METOPROLOL SUCCINATE (TOPROL-XL) 50 MG 24 HR TABLET    Take 1 tablet by mouth daily.     Other  Multi vitamin gummies     VITALS  BP Readings from Last 3 Encounters:   11/09/21 (!) 145/85   11/01/21 (!) 142/82   10/21/21 (!) 171/89     Pulse Readings from Last 3 Encounters:   11/01/21 86   10/21/21 85   07/27/21 84     Wt Readings from Last 3 Encounters:   11/01/21 73.5 kg   10/21/21 73.4 kg   07/27/21 73.4 kg       LABS  Cholesterol (mg/dL)   Date Value   07/26/2021 216 (H)     Triglycerides (mg/dL)   Date Value   07/26/2021 153 (H)     HDL (mg/dL)   Date Value   07/26/2021 48 (L)     LDL (mg/dL)   Date Value   07/26/2021 137 (H)     Hemoglobin A1C (%)   Date Value   07/26/2021 6.3 (H)     Microalbumin/ Creatinine Ratio (mg/g)   Date Value   04/09/2021 141.1 (H)     TSH (mcUnits/mL)   Date Value   11/22/2019 0.806     Sodium (mmol/L)   Date Value   07/26/2021 141     Potassium (mmol/L)   Date Value   07/26/2021 4.2     Creatinine (mg/dL)   Date Value   07/26/2021 1.38 (H)     The 10-year ASCVD risk score (Shiva ERIK Jr., et al., 2013) is: 46%    Values used to calculate the score:      Age: 61 years      Sex: Female      Is Non- : Yes      Diabetic: Yes      Tobacco smoker: Yes      Systolic Blood Pressure: 145 mmHg      Is BP treated: Yes      HDL Cholesterol: 48 mg/dL      Total Cholesterol: 216 mg/dL     GFR Estimate,  (no units)   Date Value   09/11/2020 74       ASSESSMENT/PLAN  The above medical conditions and medications were assessed for goals of medical therapy and medication optimization (adherence, cost, medication simplification, side effects, DDI's, dose, etc.).    Diabetes:   --Hemoglobin A1C is above goal of <7%, FBGs at home are above goal.   --  Discussed the importance of eating meals to avoid hypoglycemia   - CONT Glipizide ER 10mg two times daily before breakfast and dinner  - CONT Jardiance 10mg daily. Will recommend to uptitrate her jardiance dose after A1C is obtained today and if she has less hypoglycemic episodes secondary to low appetite.  --Check blood sugars twice daily - once in the morning and once before dinner  --Bring your glucose meter next visit.  --Unable to tolerate metformin and current GFR is <45ml/min so it is not recommended to initiated Metformin at this time.   --Would avoid GLP1 given nausea/ weight loss and history of acute pancreatitis in 2010.    Hypertension: Blood pressure is above goal of  <130/80 . Has not taken her meds for the past 4 days. Will pick meds up from the pharmacy today:  · CONT amlodipine 10 mg daily   · CONT metoprolol ER 50 mg daily  · CONT Irbesartan 300mg daily   · aspirin 325 mg daily stopped by PCP per patient. F/up with Dr. Pang is needed.   · CONT clopidogrel 75 mg daily   · CONT atorvastatin 40 mg daily   · Check BP, bring to clinic    EDUCATION  Patient was educated on the following topics:  Disease pathophysiology and complications  Goals of therapy  Mechanism of action, side effects, and proper usage of medications  Lifestyle interventions including proper nutrition including limiting carbs  Signs and symptoms of hypoglycemia and proper treatment    Patient verbalized understanding of the topics discussed and was given the pharmacist’s telephone number to call in the event a question arises.    RETURN  The patient will return to clinic to see PharmD in 3 weeks    The patient will return to clinic to see PCP PRN    TIME SPENT ON VISIT  30 minutes were spent face-to-face in today's visit in discussion related to medical management of the patient's disease states.    Brittney Duke, CelesteD   DC instructions

## 2022-10-22 ENCOUNTER — EMERGENCY (EMERGENCY)
Facility: HOSPITAL | Age: 22
LOS: 0 days | Discharge: HOME | End: 2022-10-22
Attending: EMERGENCY MEDICINE
Payer: OTHER GOVERNMENT

## 2022-10-22 ENCOUNTER — EMERGENCY (EMERGENCY)
Facility: HOSPITAL | Age: 22
LOS: 1 days | Discharge: ANOTHER HOSPITAL | End: 2022-10-22
Attending: EMERGENCY MEDICINE
Payer: OTHER GOVERNMENT

## 2022-10-22 VITALS
HEART RATE: 82 BPM | DIASTOLIC BLOOD PRESSURE: 60 MMHG | SYSTOLIC BLOOD PRESSURE: 105 MMHG | TEMPERATURE: 99 F | HEIGHT: 65 IN | RESPIRATION RATE: 18 BRPM | OXYGEN SATURATION: 100 % | WEIGHT: 164.91 LBS

## 2022-10-22 VITALS
RESPIRATION RATE: 16 BRPM | OXYGEN SATURATION: 100 % | TEMPERATURE: 99 F | HEART RATE: 88 BPM | SYSTOLIC BLOOD PRESSURE: 126 MMHG | DIASTOLIC BLOOD PRESSURE: 73 MMHG

## 2022-10-22 VITALS — WEIGHT: 164.91 LBS | HEIGHT: 65 IN

## 2022-10-22 DIAGNOSIS — E11.9 TYPE 2 DIABETES MELLITUS WITHOUT COMPLICATIONS: ICD-10-CM

## 2022-10-22 DIAGNOSIS — F60.3 BORDERLINE PERSONALITY DISORDER: ICD-10-CM

## 2022-10-22 DIAGNOSIS — Z91.018 ALLERGY TO OTHER FOODS: ICD-10-CM

## 2022-10-22 DIAGNOSIS — Z79.01 LONG TERM (CURRENT) USE OF ANTICOAGULANTS: ICD-10-CM

## 2022-10-22 DIAGNOSIS — F43.0 ACUTE STRESS REACTION: ICD-10-CM

## 2022-10-22 DIAGNOSIS — F39 UNSPECIFIED MOOD [AFFECTIVE] DISORDER: ICD-10-CM

## 2022-10-22 DIAGNOSIS — F32.A DEPRESSION, UNSPECIFIED: ICD-10-CM

## 2022-10-22 DIAGNOSIS — F41.9 ANXIETY DISORDER, UNSPECIFIED: ICD-10-CM
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
## 2022-10-22 DIAGNOSIS — Z20.822 CONTACT WITH AND (SUSPECTED) EXPOSURE TO COVID-19: ICD-10-CM

## 2022-10-22 DIAGNOSIS — F43.10 POST-TRAUMATIC STRESS DISORDER, UNSPECIFIED: ICD-10-CM

## 2022-10-22 DIAGNOSIS — Z98.890 OTHER SPECIFIED POSTPROCEDURAL STATES: Chronic | ICD-10-CM

## 2022-10-22 DIAGNOSIS — T74.21XA ADULT SEXUAL ABUSE, CONFIRMED, INITIAL ENCOUNTER: ICD-10-CM

## 2022-10-22 DIAGNOSIS — Z56.0 UNEMPLOYMENT, UNSPECIFIED: ICD-10-CM

## 2022-10-22 DIAGNOSIS — F44.81 DISSOCIATIVE IDENTITY DISORDER: ICD-10-CM

## 2022-10-22 DIAGNOSIS — F90.9 ATTENTION-DEFICIT HYPERACTIVITY DISORDER, UNSPECIFIED TYPE: ICD-10-CM
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
## 2022-10-22 DIAGNOSIS — F84.0 AUTISTIC DISORDER: ICD-10-CM

## 2022-10-22 DIAGNOSIS — R45.851 SUICIDAL IDEATIONS: ICD-10-CM

## 2022-10-22 DIAGNOSIS — Z79.84 LONG TERM (CURRENT) USE OF ORAL HYPOGLYCEMIC DRUGS: ICD-10-CM

## 2022-10-22 DIAGNOSIS — Z86.718 PERSONAL HISTORY OF OTHER VENOUS THROMBOSIS AND EMBOLISM: ICD-10-CM
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
## 2022-10-22 LAB
ALBUMIN SERPL ELPH-MCNC: 3.7 G/DL — SIGNIFICANT CHANGE UP (ref 3.3–5)
ALP SERPL-CCNC: 72 U/L — SIGNIFICANT CHANGE UP (ref 40–120)
ALT FLD-CCNC: 19 U/L — SIGNIFICANT CHANGE UP (ref 12–78)
ANION GAP SERPL CALC-SCNC: 6 MMOL/L — SIGNIFICANT CHANGE UP (ref 5–17)
APPEARANCE UR: CLEAR — SIGNIFICANT CHANGE UP
AST SERPL-CCNC: 11 U/L — LOW (ref 15–37)
BILIRUB SERPL-MCNC: 0.2 MG/DL — SIGNIFICANT CHANGE UP (ref 0.2–1.2)
BILIRUB UR-MCNC: NEGATIVE — SIGNIFICANT CHANGE UP
BUN SERPL-MCNC: 11 MG/DL — SIGNIFICANT CHANGE UP (ref 7–23)
CALCIUM SERPL-MCNC: 9.3 MG/DL — SIGNIFICANT CHANGE UP (ref 8.5–10.1)
CHLORIDE SERPL-SCNC: 111 MMOL/L — HIGH (ref 96–108)
CO2 SERPL-SCNC: 24 MMOL/L — SIGNIFICANT CHANGE UP (ref 22–31)
COLOR SPEC: YELLOW — SIGNIFICANT CHANGE UP
CREAT SERPL-MCNC: 0.95 MG/DL — SIGNIFICANT CHANGE UP (ref 0.5–1.3)
DIFF PNL FLD: NEGATIVE — SIGNIFICANT CHANGE UP
EGFR: 86 ML/MIN/1.73M2 — SIGNIFICANT CHANGE UP
GLUCOSE SERPL-MCNC: 85 MG/DL — SIGNIFICANT CHANGE UP (ref 70–99)
GLUCOSE UR QL: NEGATIVE — SIGNIFICANT CHANGE UP
HIV 1 & 2 AB SERPL IA.RAPID: SIGNIFICANT CHANGE UP
KETONES UR-MCNC: NEGATIVE — SIGNIFICANT CHANGE UP
LEUKOCYTE ESTERASE UR-ACNC: ABNORMAL
NITRITE UR-MCNC: NEGATIVE — SIGNIFICANT CHANGE UP
PH UR: 7 — SIGNIFICANT CHANGE UP (ref 5–8)
POTASSIUM SERPL-MCNC: 4.2 MMOL/L — SIGNIFICANT CHANGE UP (ref 3.5–5.3)
POTASSIUM SERPL-SCNC: 4.2 MMOL/L — SIGNIFICANT CHANGE UP (ref 3.5–5.3)
PROT SERPL-MCNC: 7.1 GM/DL — SIGNIFICANT CHANGE UP (ref 6–8.3)
PROT UR-MCNC: NEGATIVE — SIGNIFICANT CHANGE UP
SODIUM SERPL-SCNC: 141 MMOL/L — SIGNIFICANT CHANGE UP (ref 135–145)
SP GR SPEC: 1 — LOW (ref 1.01–1.02)
UROBILINOGEN FLD QL: NEGATIVE — SIGNIFICANT CHANGE UP

## 2022-10-22 PROCEDURE — 99284 EMERGENCY DEPT VISIT MOD MDM: CPT

## 2022-10-22 PROCEDURE — 99053 MED SERV 10PM-8AM 24 HR FAC: CPT

## 2022-10-22 PROCEDURE — 81001 URINALYSIS AUTO W/SCOPE: CPT

## 2022-10-22 PROCEDURE — 85025 COMPLETE CBC W/AUTO DIFF WBC: CPT

## 2022-10-22 PROCEDURE — 93005 ELECTROCARDIOGRAM TRACING: CPT

## 2022-10-22 PROCEDURE — 0241U: CPT

## 2022-10-22 PROCEDURE — 99285 EMERGENCY DEPT VISIT HI MDM: CPT

## 2022-10-22 PROCEDURE — 84702 CHORIONIC GONADOTROPIN TEST: CPT

## 2022-10-22 PROCEDURE — 86703 HIV-1/HIV-2 1 RESULT ANTBDY: CPT

## 2022-10-22 PROCEDURE — 80178 ASSAY OF LITHIUM: CPT

## 2022-10-22 PROCEDURE — 87086 URINE CULTURE/COLONY COUNT: CPT

## 2022-10-22 PROCEDURE — 36415 COLL VENOUS BLD VENIPUNCTURE: CPT

## 2022-10-22 PROCEDURE — 80053 COMPREHEN METABOLIC PANEL: CPT

## 2022-10-22 PROCEDURE — 80048 BASIC METABOLIC PNL TOTAL CA: CPT

## 2022-10-22 PROCEDURE — 80307 DRUG TEST PRSMV CHEM ANLYZR: CPT

## 2022-10-22 SDOH — ECONOMIC STABILITY - INCOME SECURITY: UNEMPLOYMENT, UNSPECIFIED: Z56.0

## 2022-10-22 NOTE — ED BEHAVIORAL HEALTH ASSESSMENT NOTE - DETAILS
Denies As per HPI / Formulation / Summary ED Attending prazosin may have contributed to episodes of dizziness.

## 2022-10-22 NOTE — ED PROVIDER NOTE - PATIENT PORTAL LINK FT
You can access the FollowMyHealth Patient Portal offered by [facility] by registering at the following website: [email or web address]. By joining [employer]’s FollowMyHealth portal, you will also be able to view your health information using other applications (apps) compatible with our system.
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
## 2022-10-22 NOTE — ED ADULT TRIAGE NOTE - NS_BH TRG Q3YESTPIY_ED_ALL_ED
CHIEF COMPLAINT: Annual exam.     HISTORY OF PRESENT ILLNESS:  Eunice is a 23 year old   The patient has not been asked about pregnancy. female, seen today for annual GYN (Gynecological) exam.  She has been having dyspareunia, itching and irritation since March. She had a STD screen [facility] at [facility] in May and has not been sexually active since that time. She was treated for yeast twice without relief. She has not previously had pain with intercourse.     PAST MEDICAL HISTORY:    Mononucleosis                                                 PAST SURGICAL HISTORY:  There is no previous surgical history on file.    MEDICATIONS:  Current Outpatient Medications   Medication Sig Dispense Refill   • promethazine-codeine (PHENERGAN WITH CODEINE) 6.25-10 MG/5ML syrup 5-10 ml every 4-6 hrs prn cough 240 mL 1   • pantoprazole (PROTONIX) 40 MG tablet Take 1 tablet by mouth daily. 30 tablet 5     No current facility-administered medications for this visit.        ALLERGIES:  ALLERGIES:   Allergen Reactions   • Shellfish-Derived Products   (Food Or Med) ANAPHYLAXIS     Carries a e-pen at all times       SOCIAL HISTORY:  Social History     Tobacco Use   • Smoking status: Never Smoker   • Smokeless tobacco: Never Used   Substance Use Topics   • Alcohol use: Not on file     Marital status: Single    FAMILY HISTORY:  History reviewed. No pertinent family history.    PAST GYNECOLOGIC HISTORY:  Patient's last menstrual period was 05/25/2020.. Menses are monthly, last for 4 days in duration with a moderate amount of flow. The patient uses condoms for contraception. [facility]    PAST OBSTETRICAL HISTORY:      The patient has not been asked about pregnancy..     HEALTH MAINTENANCE:  Immunizations: Tetanus 2/18/10    REVIEW OF SYSTEMS:    Negative for any significant problems with the following:    General: unexplained fevers or chills  Cardiovascular: chest pain, palpitations or  edema  Respiratory: shortness of breath  Breasts: dominant masses or nipple discharge  Gastrointestinal: nausea/vomiting, diarrhea/constipation  Urinary: dysuria/hematuria  Reproductive: vaginal discharge, burning, odor or itching  Skin: new or changing nevi  Neurologic: headaches  Musculoskeletal: new aches or pains    PHYSICAL EXAM:  Blood pressure 112/66, pulse 80, resp. rate 12, height 5' 5\" (1.651 m), weight 49.5 kg, last menstrual period 05/25/2020., Body mass index is 18.16 kg/m²., Patient's last menstrual period was 05/25/2020.  General: Well developed, well nourished, in no acute distress  Psychiatric: Alert and cooperative; normal mood and affect  HEENT: Atraumatic, normocephalic  Cardiovascular: Regular rate and rhythm  Respiratory: Clear to auscultation bilaterally.  Unlabored respiratory effort  Breasts:  Symmetric.  There are no skin changes, dominant masses, nipple discharge, or axillary lymphadenopathy bilaterally  Abdomen: Abdomen soft and non-tender without masses, hepatosplenomegaly or hernias noted  Neurologic: No focal deficits   Skin: Warm and dry without lesions  Extremities: Without edema or cyanosis    Pelvic Exam:  EXTERNAL: Normal female external genitalia, no lesions, normal hair distribution.  URETHRAL MEATUS: Normal-appearing urethra without lesions or prolapse.  URETHRA: No masses or tenderness.   BLADDER: Nontender. Nondistended.  VAGINA: Rugated. Normal physiologic discharge. No lesions. Well supported.  CERVIX: Appears to be normal without any gross lesions.  UTERUS: Small, firm and nontender, in mid position.  ADNEXA: No masses, enlargement, or tenderness.  ANUS AND PERINEUM: No lesions.    ASSESSMENT:    Eunice is a 23 year old   The patient has not been asked about pregnancy. female who presents for annual visit.    PLAN:    Pap smear was not performed  Discussed diet and exercise  Breast self-awareness  Vaginal culture was done  If vaginal culture is negative would treat for  desquamative vaginitis with hydrocortisone suppositories   Follow up in one year or as needed                  24 hours
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
## 2022-10-22 NOTE — ED BEHAVIORAL HEALTH ASSESSMENT NOTE - NSBHATTESTAPPBILLTIME_PSY_A_CORE
I attest my time as FERNANDA is greater than 50% of the total combined time spent on qualifying patient care activities. I have reviewed and verified the documentation.
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
## 2022-10-22 NOTE — ED PROVIDER NOTE - PHYSICAL EXAMINATION
Constitutional: NAD agitated AAOx3  Eyes: PERRLA EOMI  Head: Normocephalic atraumatic  Mouth: MMM  Cardiac: regular rate   Resp: Lungs CTAB  GI: Abd s/nt/nd, no rebound or guarding.  Neuro: awake, alert, moving all extremities, cranial nerves 2-12 intact, sensation intact, no dysmetria.  Skin: No rashes  psych no si or hi
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
## 2022-10-22 NOTE — ED ADULT NURSE NOTE - DOES PATIENT HAVE ADVANCE DIRECTIVE
Problem: PHYSICAL THERAPY ADULT  Goal: Performs mobility at highest level of function for planned discharge setting  See evaluation for individualized goals  Treatment/Interventions: Functional transfer training, Elevations, Therapeutic exercise, Endurance training, Cognitive reorientation, Patient/family training, Equipment eval/education, Bed mobility, Gait training, Continued evaluation, Spoke to nursing  Equipment Recommended:  (TBD)       See flowsheet documentation for full assessment, interventions and recommendations  Outcome: Progressing  Prognosis: Good  Problem List: Impaired balance, Decreased mobility, Decreased cognition  Assessment: Pt seen for PT treatment session this date with interventions consisting of gait training w/ emphasis on improving pt's ability to ambulate level surfaces x 125 ft with close S provided by therapist with RW, therapeutic activity consisting of training: supine<>sit transfers, sit<>stand transfers, static sitting tolerance at EOB for 5 minutes w/ no UE support and vc and tactile cues for static standing posture faciliation and navigating 1 stairs w/ B UE support on RW handrail with step to pattern with close S x 10 reps  Pt agreeable to PT treatment session upon arrival, pt found supine in bed w/ HOB elevated, in no apparent distress, A&O x 3 and responsive  In comparison to previous session, pt with improvements in demonstrating consistency w/ level surface mobility x household and short community distances  Minor lateral veering observed w/ RW use, however pt self corrected to neutral  Pt demonstrating ability to ascend/descend 1 PT  step x 10 trials, good endurance observed w/ such  Pt requiring increased time for placement of R UE onto RW handle  Post session: pt returned BTB, all needs in reach and RN notified of session findings/recommendations   Continue to recommend Home PT vs  STR at time of d/c in order to maximize pt's functional independence and safety w/ mobility  Pt continues to be functioning below baseline level, and remains limited 2* factors listed above  PT will continue to see pt while here in order to address the deficits listed above and provide interventions consistent w/ POC in effort to achieve STGs  Barriers to Discharge: None (pt reporting she has good support from )  Barriers to Discharge Comments: PT unable to assess level surface amb x household distances or elevations at this time  Recommendation: Home PT, Home with family support (HHPT vs  STR, pending progress)     PT - OK to Discharge: Yes (when medically cleared)    See flowsheet documentation for full assessment  No
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
## 2022-10-22 NOTE — ED BEHAVIORAL HEALTH ASSESSMENT NOTE - DESCRIPTION
calm, oddly related    ICU Vital Signs Last 24 Hrs  T(C): 36.9 (05 Aug 2022 16:29), Max: 36.9 (05 Aug 2022 16:29)  T(F): 98.5 (05 Aug 2022 16:29), Max: 98.5 (05 Aug 2022 16:29)  HR: 100 (05 Aug 2022 16:29) (100 - 100)  BP: 105/66 (05 Aug 2022 16:29) (105/66 - 105/66)  BP(mean): --  ABP: --  ABP(mean): --  RR: 16 (05 Aug 2022 16:29) (16 - 16)  SpO2: 99% (05 Aug 2022 16:29) (99% - 99%)    O2 Parameters below as of 05 Aug 2022 16:29  Patient On (Oxygen Delivery Method): room air lives at [facility] group home, unemployed, reports she is staying with parents for the summer. borderline DM- reports her PCP does not recommend home fingersticks.

## 2022-10-23 DIAGNOSIS — F60.3 BORDERLINE PERSONALITY DISORDER: ICD-10-CM

## 2022-10-23 DIAGNOSIS — F39 UNSPECIFIED MOOD [AFFECTIVE] DISORDER: ICD-10-CM

## 2022-10-23 DIAGNOSIS — F43.0 ACUTE STRESS REACTION: ICD-10-CM

## 2022-10-23 DIAGNOSIS — F84.0 AUTISTIC DISORDER: ICD-10-CM

## 2022-10-23 LAB
ADD ON TEST-SPECIMEN IN LAB: SIGNIFICANT CHANGE UP
ANION GAP SERPL CALC-SCNC: 3 MMOL/L — LOW (ref 5–17)
APAP SERPL-MCNC: <2 UG/ML — LOW (ref 10–30)
APPEARANCE UR: CLEAR — SIGNIFICANT CHANGE UP
BASOPHILS # BLD AUTO: 0.07 K/UL — SIGNIFICANT CHANGE UP (ref 0–0.2)
BASOPHILS NFR BLD AUTO: 0.6 % — SIGNIFICANT CHANGE UP (ref 0–2)
BILIRUB UR-MCNC: NEGATIVE — SIGNIFICANT CHANGE UP
BUN SERPL-MCNC: 14 MG/DL — SIGNIFICANT CHANGE UP (ref 7–23)
CALCIUM SERPL-MCNC: 9 MG/DL — SIGNIFICANT CHANGE UP (ref 8.5–10.1)
CHLORIDE SERPL-SCNC: 109 MMOL/L — HIGH (ref 96–108)
CO2 SERPL-SCNC: 28 MMOL/L — SIGNIFICANT CHANGE UP (ref 22–31)
COLOR SPEC: YELLOW — SIGNIFICANT CHANGE UP
CREAT SERPL-MCNC: 0.93 MG/DL — SIGNIFICANT CHANGE UP (ref 0.5–1.3)
CULTURE RESULTS: SIGNIFICANT CHANGE UP
DIFF PNL FLD: NEGATIVE — SIGNIFICANT CHANGE UP
EGFR: 89 ML/MIN/1.73M2 — SIGNIFICANT CHANGE UP
EOSINOPHIL # BLD AUTO: 0.52 K/UL — HIGH (ref 0–0.5)
EOSINOPHIL NFR BLD AUTO: 4.4 % — SIGNIFICANT CHANGE UP (ref 0–6)
ETHANOL SERPL-MCNC: <10 MG/DL — SIGNIFICANT CHANGE UP (ref 0–10)
GLUCOSE SERPL-MCNC: 85 MG/DL — SIGNIFICANT CHANGE UP (ref 70–99)
GLUCOSE UR QL: NEGATIVE — SIGNIFICANT CHANGE UP
HCG SERPL-ACNC: <1 MIU/ML — SIGNIFICANT CHANGE UP
HCT VFR BLD CALC: 36.4 % — SIGNIFICANT CHANGE UP (ref 34.5–45)
HGB BLD-MCNC: 11.1 G/DL — LOW (ref 11.5–15.5)
IMM GRANULOCYTES NFR BLD AUTO: 0.2 % — SIGNIFICANT CHANGE UP (ref 0–0.9)
KETONES UR-MCNC: NEGATIVE — SIGNIFICANT CHANGE UP
LEUKOCYTE ESTERASE UR-ACNC: ABNORMAL
LYMPHOCYTES # BLD AUTO: 26.1 % — SIGNIFICANT CHANGE UP (ref 13–44)
LYMPHOCYTES # BLD AUTO: 3.08 K/UL — SIGNIFICANT CHANGE UP (ref 1–3.3)
MCHC RBC-ENTMCNC: 25.1 PG — LOW (ref 27–34)
MCHC RBC-ENTMCNC: 30.5 GM/DL — LOW (ref 32–36)
MCV RBC AUTO: 82.4 FL — SIGNIFICANT CHANGE UP (ref 80–100)
MONOCYTES # BLD AUTO: 0.58 K/UL — SIGNIFICANT CHANGE UP (ref 0–0.9)
MONOCYTES NFR BLD AUTO: 4.9 % — SIGNIFICANT CHANGE UP (ref 2–14)
NEUTROPHILS # BLD AUTO: 7.51 K/UL — HIGH (ref 1.8–7.4)
NEUTROPHILS NFR BLD AUTO: 63.8 % — SIGNIFICANT CHANGE UP (ref 43–77)
NITRITE UR-MCNC: NEGATIVE — SIGNIFICANT CHANGE UP
PCP SPEC-MCNC: SIGNIFICANT CHANGE UP
PH UR: 7 — SIGNIFICANT CHANGE UP (ref 5–8)
PLATELET # BLD AUTO: 355 K/UL — SIGNIFICANT CHANGE UP (ref 150–400)
POTASSIUM SERPL-MCNC: 3.7 MMOL/L — SIGNIFICANT CHANGE UP (ref 3.5–5.3)
POTASSIUM SERPL-SCNC: 3.7 MMOL/L — SIGNIFICANT CHANGE UP (ref 3.5–5.3)
PROT UR-MCNC: NEGATIVE — SIGNIFICANT CHANGE UP
RBC # BLD: 4.42 M/UL — SIGNIFICANT CHANGE UP (ref 3.8–5.2)
RBC # FLD: 14.8 % — HIGH (ref 10.3–14.5)
SALICYLATES SERPL-MCNC: <1.7 MG/DL — LOW (ref 2.8–20)
SODIUM SERPL-SCNC: 140 MMOL/L — SIGNIFICANT CHANGE UP (ref 135–145)
SP GR SPEC: 1 — LOW (ref 1.01–1.02)
SPECIMEN SOURCE: SIGNIFICANT CHANGE UP
UROBILINOGEN FLD QL: NEGATIVE — SIGNIFICANT CHANGE UP
WBC # BLD: 11.78 K/UL — HIGH (ref 3.8–10.5)
WBC # FLD AUTO: 11.78 K/UL — HIGH (ref 3.8–10.5)

## 2022-10-23 PROCEDURE — 93010 ELECTROCARDIOGRAM REPORT: CPT

## 2022-10-23 PROCEDURE — 90792 PSYCH DIAG EVAL W/MED SRVCS: CPT | Mod: 95

## 2022-10-23 RX ORDER — DIPHENHYDRAMINE HCL 50 MG
50 CAPSULE ORAL EVERY 6 HOURS
Refills: 0 | Status: DISCONTINUED | OUTPATIENT
Start: 2022-10-23 | End: 2022-10-26

## 2022-10-23 RX ORDER — HALOPERIDOL DECANOATE 100 MG/ML
5 INJECTION INTRAMUSCULAR EVERY 6 HOURS
Refills: 0 | Status: DISCONTINUED | OUTPATIENT
Start: 2022-10-23 | End: 2022-10-26

## 2022-10-23 RX ORDER — METFORMIN HYDROCHLORIDE 850 MG/1
1000 TABLET ORAL
Refills: 0 | Status: DISCONTINUED | OUTPATIENT
Start: 2022-10-23 | End: 2022-10-26

## 2022-10-23 RX ORDER — LITHIUM CARBONATE 300 MG/1
450 TABLET, EXTENDED RELEASE ORAL
Refills: 0 | Status: DISCONTINUED | OUTPATIENT
Start: 2022-10-23 | End: 2022-10-26

## 2022-10-23 RX ADMIN — LITHIUM CARBONATE 450 MILLIGRAM(S): 300 TABLET, EXTENDED RELEASE ORAL at 21:44

## 2022-10-23 RX ADMIN — METFORMIN HYDROCHLORIDE 1000 MILLIGRAM(S): 850 TABLET ORAL at 21:44
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
## 2022-10-23 NOTE — BH CONSULTATION LIAISON PROGRESS NOTE - NSBHCHARTREVIEWVS_PSY_A_CORE FT
Vital Signs Last 24 Hrs  T(C): 36.9 (23 Oct 2022 05:45), Max: 37.4 (22 Oct 2022 11:22)  T(F): 98.4 (23 Oct 2022 05:45), Max: 99.3 (22 Oct 2022 11:22)  HR: 65 (23 Oct 2022 05:45) (65 - 97)  BP: 100/62 (23 Oct 2022 05:45) (100/62 - 126/73)  BP(mean): 92 (22 Oct 2022 11:22) (92 - 92)  RR: 18 (23 Oct 2022 05:45) (16 - 18)  SpO2: 97% (23 Oct 2022 05:45) (97% - 100%)    Parameters below as of 23 Oct 2022 05:45  Patient On (Oxygen Delivery Method): room air
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
## 2022-10-23 NOTE — BH CONSULTATION LIAISON PROGRESS NOTE - NSICDXBHSECONDARYDX_PSY_ALL_CORE
Acute stress reaction causing mixed disturbance of emotion and conduct   F43.0  Borderline personality disorder   F60.3  Mood disorder   F39
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
## 2022-10-23 NOTE — ED BEHAVIORAL HEALTH ASSESSMENT NOTE - CURRENT MEDICATION
Per chart: Eliquis 5 mg BID, Lithium 750 mg HS, Metformin 1000 mg BID, Concerta ER 36 mg daily and 10 mg at 1pm

## 2022-10-23 NOTE — ED BEHAVIORAL HEALTH ASSESSMENT NOTE - OTHER
flat oddly related Either at the  w/ peers or at times w/ parents and two dogs Records checked– with data: [facility] (seen at  10/22/22 – [facility]), [facility] (HH: [id number], [id number]), [facility] (Crisis Center: [id number], [id number], [id number], OP ECT: [id number]). Records checked- no data: Alpha tab, Tier, HIE, One Content, Meditech, Soarian, webcrims, nysdoccslookup, google search. [facility] self activated suicide hotline borderline intellect not observed
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
## 2022-10-23 NOTE — ED BEHAVIORAL HEALTH ASSESSMENT NOTE - VIOLENCE PROTECTIVE FACTORS:
Residential stability/Sobriety/Engagement in treatment/Insight into violence risk and need for management/treatment/Good treatment response/compliance

## 2022-10-23 NOTE — ED BEHAVIORAL HEALTH ASSESSMENT NOTE - PATIENT'S CHIEF COMPLAINT
"I think that I started out with messaging the national suicide hotline and then from there they called 911."

## 2022-10-23 NOTE — ED BEHAVIORAL HEALTH ASSESSMENT NOTE - REASON
ongoing safety monitoring; no psychiatric bed available; likelihood of symptom resolution before bed becomes available.

## 2022-10-23 NOTE — BH CONSULTATION LIAISON PROGRESS NOTE - NSBHCONSULTMEDSEVERE_PSY_A_CORE FT
Haldol 5 mg PO/IM, Ativan 2 mg PO/IM, and Benadryl 50 mg PO/IM PRN Q6H for psychosis / anxiety / mood dysregulation / behavioral disturbances.

## 2022-10-23 NOTE — ED BEHAVIORAL HEALTH ASSESSMENT NOTE - DIFFERENTIAL
Acute stress reaction w/ transient suicidality  vs Exacerbated mood disorder  Borderline Personality Disorder

## 2022-10-23 NOTE — ED BEHAVIORAL HEALTH ASSESSMENT NOTE - PAST PSYCHOTROPIC MEDICATION
Per psyckes; Lexapro, Topamax, Vraylar and Celexa trials in the past.   Per chart; Prozac, Latuda and Prazosin

## 2022-10-23 NOTE — ED BEHAVIORAL HEALTH NOTE - BEHAVIORAL HEALTH NOTE
SW advised pt requires admission, searched bed availability @ [facility], [facility] ( bed availability likely Monday) [facility], [facility],  [facility], [facility], [facility] with no vacancies today [place]
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
## 2022-10-23 NOTE — ED BEHAVIORAL HEALTH ASSESSMENT NOTE - NSPRESENTSXS_PSY_ALL_CORE
expressing SI w/ plan &  intent/Depressed mood/Anhedonia/Refusal or inability to complete safety plan
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
## 2022-10-23 NOTE — ED BEHAVIORAL HEALTH ASSESSMENT NOTE - NSSUICPROTFACT_PSY_ALL_CORE
contradictory help seeking behaviors/Supportive social network of family or friends/Positive therapeutic relationships

## 2022-10-24 RX ORDER — ALPRAZOLAM 0.25 MG
0.5 TABLET ORAL ONCE
Refills: 0 | Status: DISCONTINUED | OUTPATIENT
Start: 2022-10-24 | End: 2022-10-24

## 2022-10-24 RX ADMIN — METFORMIN HYDROCHLORIDE 1000 MILLIGRAM(S): 850 TABLET ORAL at 10:24

## 2022-10-24 RX ADMIN — LITHIUM CARBONATE 450 MILLIGRAM(S): 300 TABLET, EXTENDED RELEASE ORAL at 10:24

## 2022-10-24 RX ADMIN — LITHIUM CARBONATE 450 MILLIGRAM(S): 300 TABLET, EXTENDED RELEASE ORAL at 22:22

## 2022-10-24 RX ADMIN — METFORMIN HYDROCHLORIDE 1000 MILLIGRAM(S): 850 TABLET ORAL at 22:22

## 2022-10-24 NOTE — DISCHARGE NOTE BEHAVIORAL HEALTH - PAST PSYCHIATRIC HISTORY
Multiple prior hospitalizations in [facility], current outpatient tx, with two previous suicide attempts in 2016 - OD (no hospitalization req), 2019 - trying to drown self in bathtub, Post Op

## 2022-10-24 NOTE — ED BEHAVIORAL HEALTH NOTE - BEHAVIORAL HEALTH NOTE
=========  REASSESSMENT  =========  SOURCE:  RN Ryan and secondhand ED documentation.  BEHAVIOR: RN described patient to be calm and cooperative, currently sleeping. Pt ate dinner. No acute issues or concerns. No episodes of agitation or violence.   TREATMENT:  Per chart and RN, patient did not receive PRN medications.   VISITORS:  Per RN, no visitors at bedside.

## 2022-10-24 NOTE — ED BEHAVIORAL HEALTH NOTE - BEHAVIORAL HEALTH NOTE
In an attempt to secure inpatient psych bed for pt, [facility] team reached out to [facility], [facility], [facility], and [facility] who all indicated no available beds. A message was left for [facility]. [facility] was also called. [facility] requested clinicals be faxed and they will try to arrange bed for pt pending discharges. Clinicals faxed as requested.  [facility] awaiting response from [facility]. [facility] to follow.

## 2022-10-24 NOTE — ED BEHAVIORAL HEALTH NOTE - BEHAVIORAL HEALTH NOTE
PT si waiting for  bed .   Mod describes as ":me" and explains that means it is not good. Poor eye contact. States somebody told them there  will be bed in [facility] last night.   Pt slept  well. Ate well Continue  to seek psych admission  for suicidal thinking.  NO clearly formulate plan.   PT is taking home meds .        Plan continue  home meds and CSW to continue to look for  bed.

## 2022-10-25 VITALS
OXYGEN SATURATION: 100 % | HEART RATE: 75 BPM | TEMPERATURE: 98 F | SYSTOLIC BLOOD PRESSURE: 105 MMHG | DIASTOLIC BLOOD PRESSURE: 63 MMHG | RESPIRATION RATE: 16 BRPM

## 2022-10-25 LAB
FLUAV AG NPH QL: SIGNIFICANT CHANGE UP
FLUBV AG NPH QL: SIGNIFICANT CHANGE UP
RSV RNA NPH QL NAA+NON-PROBE: SIGNIFICANT CHANGE UP
SARS-COV-2 RNA SPEC QL NAA+PROBE: SIGNIFICANT CHANGE UP

## 2022-10-25 RX ADMIN — LITHIUM CARBONATE 450 MILLIGRAM(S): 300 TABLET, EXTENDED RELEASE ORAL at 11:56

## 2022-10-25 RX ADMIN — Medication 0.5 MILLIGRAM(S): at 02:21

## 2022-10-25 RX ADMIN — METFORMIN HYDROCHLORIDE 1000 MILLIGRAM(S): 850 TABLET ORAL at 11:57

## 2022-10-25 NOTE — BH CHART NOTE - NSEVENTNOTEFT_PSY_ALL_CORE
No interval [facility]   CSW to continue  to look for bed and make referrals.     Pt is calm, NO specific complaints.     MEDICATIONS  (STANDING):  lithium CR (ESKALITH-CR) 450 milliGRAM(s) Oral two times a day  metFORMIN 1000 milliGRAM(s) Oral two times a day    MEDICATIONS  (PRN):  diphenhydrAMINE Injectable 50 milliGRAM(s) IntraMuscular every 6 hours PRN EPS prophylaxis  haloperidol    Injectable 5 milliGRAM(s) IntraMuscular every 6 hours PRN Mood Dysregulation  LORazepam   Injectable 2 milliGRAM(s) IntraMuscular every 6 hours PRN Anxiety SEVERE      Lithium Level, Serum (10.23.22 @ 01:35)    Lithium Level, Serum: 1.2 mmol/L

## 2022-10-25 NOTE — ED BEHAVIORAL HEALTH NOTE - BEHAVIORAL HEALTH NOTE
NOTE:     ================     Re-assessment Note     ================     SOURCE:  RN and secondhand ED documentation.     BEHAVIOR: Per RN, pt has been calm, a little upset there is no bed yet, however re-directable. Denies SI/HI, no agitation or aggression on shift.

## 2022-10-25 NOTE — ED BEHAVIORAL HEALTH NOTE - BEHAVIORAL HEALTH NOTE
Pt accepted to [facility], pt completed voluntary legals and expressed understanding and agreement for transfer. Legals in chart,  to set up transport. Tx team aware of update. VM left for pt's Mother Brittani with pt's verbal consent. SW spoke with pt's GH manager Aki with pt's verbal consent to inform of the above.

## 2022-10-25 NOTE — ED ADULT NURSE REASSESSMENT NOTE - NS ED NURSE REASSESS COMMENT FT1
Received report from RN Kala. Pt resting comfortably, resp. even and unlabored. Pt updated on care plan. Pt to be transferred to [facility]. Pending transportation. VS as noted. In NAD.
pt reassessed, 1:1 sheet signed. Patient calm and cooperative. no distress at this time.
1:1 with clinical staff maintained for safety.

## 2022-12-02 ENCOUNTER — EMERGENCY (EMERGENCY)
Facility: HOSPITAL | Age: 22
LOS: 0 days | Discharge: HOME | End: 2022-12-02
Attending: EMERGENCY MEDICINE
Payer: COMMERCIAL

## 2022-12-02 VITALS
RESPIRATION RATE: 16 BRPM | HEART RATE: 64 BPM | SYSTOLIC BLOOD PRESSURE: 111 MMHG | DIASTOLIC BLOOD PRESSURE: 54 MMHG | OXYGEN SATURATION: 100 % | HEIGHT: 65 IN | TEMPERATURE: 98 F | WEIGHT: 157.41 LBS

## 2022-12-02 VITALS
DIASTOLIC BLOOD PRESSURE: 85 MMHG | OXYGEN SATURATION: 98 % | SYSTOLIC BLOOD PRESSURE: 114 MMHG | RESPIRATION RATE: 16 BRPM | HEART RATE: 65 BPM

## 2022-12-02 DIAGNOSIS — Z79.84 LONG TERM (CURRENT) USE OF ORAL HYPOGLYCEMIC DRUGS: ICD-10-CM

## 2022-12-02 DIAGNOSIS — Z91.018 ALLERGY TO OTHER FOODS: ICD-10-CM

## 2022-12-02 DIAGNOSIS — R55 SYNCOPE AND COLLAPSE: ICD-10-CM
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
## 2022-12-02 DIAGNOSIS — R00.1 BRADYCARDIA, UNSPECIFIED: ICD-10-CM

## 2022-12-02 DIAGNOSIS — Z20.822 CONTACT WITH AND (SUSPECTED) EXPOSURE TO COVID-19: ICD-10-CM

## 2022-12-02 DIAGNOSIS — E11.9 TYPE 2 DIABETES MELLITUS WITHOUT COMPLICATIONS: ICD-10-CM

## 2022-12-02 DIAGNOSIS — R51.9 HEADACHE, UNSPECIFIED: ICD-10-CM

## 2022-12-02 DIAGNOSIS — Z98.890 OTHER SPECIFIED POSTPROCEDURAL STATES: Chronic | ICD-10-CM
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
## 2022-12-02 LAB
ALBUMIN SERPL ELPH-MCNC: 2.7 G/DL — LOW (ref 3.3–5)
ALP SERPL-CCNC: 48 U/L — SIGNIFICANT CHANGE UP (ref 40–120)
ALT FLD-CCNC: 12 U/L — SIGNIFICANT CHANGE UP (ref 12–78)
ANION GAP SERPL CALC-SCNC: 6 MMOL/L — SIGNIFICANT CHANGE UP (ref 5–17)
APPEARANCE UR: CLEAR — SIGNIFICANT CHANGE UP
AST SERPL-CCNC: 7 U/L — LOW (ref 15–37)
BASOPHILS # BLD AUTO: 0.03 K/UL — SIGNIFICANT CHANGE UP (ref 0–0.2)
BASOPHILS NFR BLD AUTO: 0.4 % — SIGNIFICANT CHANGE UP (ref 0–2)
BILIRUB SERPL-MCNC: 0.2 MG/DL — SIGNIFICANT CHANGE UP (ref 0.2–1.2)
BILIRUB UR-MCNC: NEGATIVE — SIGNIFICANT CHANGE UP
BUN SERPL-MCNC: 6 MG/DL — LOW (ref 7–23)
CALCIUM SERPL-MCNC: 7.3 MG/DL — LOW (ref 8.5–10.1)
CHLORIDE SERPL-SCNC: 118 MMOL/L — HIGH (ref 96–108)
CO2 SERPL-SCNC: 21 MMOL/L — LOW (ref 22–31)
COLOR SPEC: YELLOW — SIGNIFICANT CHANGE UP
CREAT SERPL-MCNC: 0.76 MG/DL — SIGNIFICANT CHANGE UP (ref 0.5–1.3)
DIFF PNL FLD: NEGATIVE — SIGNIFICANT CHANGE UP
EGFR: 113 ML/MIN/1.73M2 — SIGNIFICANT CHANGE UP
EOSINOPHIL # BLD AUTO: 0.2 K/UL — SIGNIFICANT CHANGE UP (ref 0–0.5)
EOSINOPHIL NFR BLD AUTO: 2.9 % — SIGNIFICANT CHANGE UP (ref 0–6)
FLUAV AG NPH QL: SIGNIFICANT CHANGE UP
FLUBV AG NPH QL: SIGNIFICANT CHANGE UP
GLUCOSE SERPL-MCNC: 75 MG/DL — SIGNIFICANT CHANGE UP (ref 70–99)
GLUCOSE UR QL: NEGATIVE — SIGNIFICANT CHANGE UP
HCG SERPL-ACNC: <1 MIU/ML — SIGNIFICANT CHANGE UP
HCT VFR BLD CALC: 31.3 % — LOW (ref 34.5–45)
HGB BLD-MCNC: 9.5 G/DL — LOW (ref 11.5–15.5)
IMM GRANULOCYTES NFR BLD AUTO: 0.1 % — SIGNIFICANT CHANGE UP (ref 0–0.9)
KETONES UR-MCNC: NEGATIVE — SIGNIFICANT CHANGE UP
LEUKOCYTE ESTERASE UR-ACNC: ABNORMAL
LYMPHOCYTES # BLD AUTO: 1.21 K/UL — SIGNIFICANT CHANGE UP (ref 1–3.3)
LYMPHOCYTES # BLD AUTO: 17.6 % — SIGNIFICANT CHANGE UP (ref 13–44)
MAGNESIUM SERPL-MCNC: 1.7 MG/DL — SIGNIFICANT CHANGE UP (ref 1.6–2.6)
MCHC RBC-ENTMCNC: 25.8 PG — LOW (ref 27–34)
MCHC RBC-ENTMCNC: 30.4 GM/DL — LOW (ref 32–36)
MCV RBC AUTO: 85.1 FL — SIGNIFICANT CHANGE UP (ref 80–100)
MONOCYTES # BLD AUTO: 0.22 K/UL — SIGNIFICANT CHANGE UP (ref 0–0.9)
MONOCYTES NFR BLD AUTO: 3.2 % — SIGNIFICANT CHANGE UP (ref 2–14)
NEUTROPHILS # BLD AUTO: 5.19 K/UL — SIGNIFICANT CHANGE UP (ref 1.8–7.4)
NEUTROPHILS NFR BLD AUTO: 75.8 % — SIGNIFICANT CHANGE UP (ref 43–77)
NITRITE UR-MCNC: NEGATIVE — SIGNIFICANT CHANGE UP
PH UR: 7 — SIGNIFICANT CHANGE UP (ref 5–8)
PHOSPHATE SERPL-MCNC: 2.5 MG/DL — SIGNIFICANT CHANGE UP (ref 2.5–4.5)
PLATELET # BLD AUTO: 191 K/UL — SIGNIFICANT CHANGE UP (ref 150–400)
POTASSIUM SERPL-MCNC: 3.4 MMOL/L — LOW (ref 3.5–5.3)
POTASSIUM SERPL-SCNC: 3.4 MMOL/L — LOW (ref 3.5–5.3)
PROT SERPL-MCNC: 5.3 GM/DL — LOW (ref 6–8.3)
PROT UR-MCNC: NEGATIVE — SIGNIFICANT CHANGE UP
RBC # BLD: 3.68 M/UL — LOW (ref 3.8–5.2)
RBC # FLD: 14.2 % — SIGNIFICANT CHANGE UP (ref 10.3–14.5)
RSV RNA NPH QL NAA+NON-PROBE: SIGNIFICANT CHANGE UP
SARS-COV-2 RNA SPEC QL NAA+PROBE: SIGNIFICANT CHANGE UP
SODIUM SERPL-SCNC: 145 MMOL/L — SIGNIFICANT CHANGE UP (ref 135–145)
SP GR SPEC: 1 — LOW (ref 1.01–1.02)
TROPONIN I, HIGH SENSITIVITY RESULT: 4.53 NG/L — SIGNIFICANT CHANGE UP
UROBILINOGEN FLD QL: NEGATIVE — SIGNIFICANT CHANGE UP
WBC # BLD: 6.86 K/UL — SIGNIFICANT CHANGE UP (ref 3.8–10.5)
WBC # FLD AUTO: 6.86 K/UL — SIGNIFICANT CHANGE UP (ref 3.8–10.5)

## 2022-12-02 PROCEDURE — 87186 SC STD MICRODIL/AGAR DIL: CPT

## 2022-12-02 PROCEDURE — 83735 ASSAY OF MAGNESIUM: CPT

## 2022-12-02 PROCEDURE — 84484 ASSAY OF TROPONIN QUANT: CPT | Mod: 91

## 2022-12-02 PROCEDURE — 71045 X-RAY EXAM CHEST 1 VIEW: CPT

## 2022-12-02 PROCEDURE — 70450 CT HEAD/BRAIN W/O DYE: CPT | Mod: MA

## 2022-12-02 PROCEDURE — 70450 CT HEAD/BRAIN W/O DYE: CPT | Mod: 26,MA

## 2022-12-02 PROCEDURE — 0241U: CPT

## 2022-12-02 PROCEDURE — 81001 URINALYSIS AUTO W/SCOPE: CPT

## 2022-12-02 PROCEDURE — 80053 COMPREHEN METABOLIC PANEL: CPT

## 2022-12-02 PROCEDURE — 82962 GLUCOSE BLOOD TEST: CPT

## 2022-12-02 PROCEDURE — 87086 URINE CULTURE/COLONY COUNT: CPT

## 2022-12-02 PROCEDURE — 93005 ELECTROCARDIOGRAM TRACING: CPT

## 2022-12-02 PROCEDURE — 99285 EMERGENCY DEPT VISIT HI MDM: CPT

## 2022-12-02 PROCEDURE — 36415 COLL VENOUS BLD VENIPUNCTURE: CPT

## 2022-12-02 PROCEDURE — 93010 ELECTROCARDIOGRAM REPORT: CPT

## 2022-12-02 PROCEDURE — 99285 EMERGENCY DEPT VISIT HI MDM: CPT | Mod: 25

## 2022-12-02 PROCEDURE — 71045 X-RAY EXAM CHEST 1 VIEW: CPT | Mod: 26

## 2022-12-02 PROCEDURE — 85025 COMPLETE CBC W/AUTO DIFF WBC: CPT

## 2022-12-02 PROCEDURE — 84702 CHORIONIC GONADOTROPIN TEST: CPT

## 2022-12-02 PROCEDURE — 84100 ASSAY OF PHOSPHORUS: CPT

## 2022-12-02 RX ORDER — POTASSIUM CHLORIDE 20 MEQ
40 PACKET (EA) ORAL ONCE
Refills: 0 | Status: COMPLETED | OUTPATIENT
Start: 2022-12-02 | End: 2022-12-02

## 2022-12-02 RX ORDER — SODIUM CHLORIDE 9 MG/ML
1000 INJECTION INTRAMUSCULAR; INTRAVENOUS; SUBCUTANEOUS ONCE
Refills: 0 | Status: COMPLETED | OUTPATIENT
Start: 2022-12-02 | End: 2022-12-02

## 2022-12-02 RX ORDER — ACETAMINOPHEN 500 MG
1 TABLET ORAL
Qty: 0 | Refills: 0 | DISCHARGE

## 2022-12-02 RX ORDER — METHYLPHENIDATE HCL 5 MG
1 TABLET ORAL
Qty: 0 | Refills: 0 | DISCHARGE

## 2022-12-02 RX ORDER — NICOTINE POLACRILEX 2 MG
1 GUM BUCCAL
Qty: 0 | Refills: 0 | DISCHARGE

## 2022-12-02 RX ADMIN — Medication 40 MILLIEQUIVALENT(S): at 15:12

## 2022-12-02 RX ADMIN — SODIUM CHLORIDE 2000 MILLILITER(S): 9 INJECTION INTRAMUSCULAR; INTRAVENOUS; SUBCUTANEOUS at 13:24

## 2022-12-02 NOTE — ED PROVIDER NOTE - PATIENT PORTAL LINK FT
You can access the FollowMyHealth Patient Portal offered by [facility] by registering at the following website: [email or web address]. By joining [employer]’s FollowMyHealth portal, you will also be able to view your health information using other applications (apps) compatible with our system.

## 2022-12-02 NOTE — ED ADULT TRIAGE NOTE - CHIEF COMPLAINT QUOTE
Patient BIBEMS for complaints of syncope. Per EMS, patient is from a group home and was called as they stated patient fell 4 times today. Patient with history of Autism and states she does not remember falling but woke up on the floor. Patient awake, alert, denies pain but states she has a mild headache.
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
## 2022-12-02 NOTE — ED PROVIDER NOTE - NSFOLLOWUPINSTRUCTIONS_ED_ALL_ED_FT
Plenty of rest  Plenty of fluids  Continue medications as prescribed.  Anything worsens or persists, return to ER for further care and evaluation.

## 2022-12-02 NOTE — ED ADULT NURSE NOTE - NSIMPLEMENTINTERV_GEN_ALL_ED
Implemented All Fall Risk Interventions:  Okay to call system. Call bell, personal items and telephone within reach. Instruct patient to call for assistance. Room bathroom lighting operational. Non-slip footwear when patient is off stretcher. Physically safe environment: no spills, clutter or unnecessary equipment. Stretcher in lowest position, wheels locked, appropriate side rails in place. Provide visual cue, wrist band, yellow gown, etc. Monitor gait and stability. Monitor for mental status changes and reorient to person, place, and time. Review medications for side effects contributing to fall risk. Reinforce activity limits and safety measures with patient and family.

## 2022-12-02 NOTE — PHARMACOTHERAPY INTERVENTION NOTE - COMMENTS
Medication reconciliation completed.  Reviewed Medication list and confirmed med allergies with list from pt's Group Home; confirmed with Dr. First Meddeb.

## 2022-12-02 NOTE — ED PROVIDER NOTE - PROGRESS NOTE DETAILS
Dylan Urbina for attending Dr. Suggs  Called [facility]  staff reports pt was on way to activity when she fell and got up and fell again for a total of 3 times. Possible LOC. Pt blood sugar 166. Pt had no vomiting or diarrhea recently. took signout from Dr Suggs pending results and PO challenge, ate a whole meal here.  pt remains feeling well.  opal PO.  ambulating safely in ED.  ok for DC with return precautions.  MD Sumanth

## 2022-12-02 NOTE — ED ADULT NURSE NOTE - CAS TRG GEN SKIN CONDITION
FAMILY HISTORY:  Sibling  Still living? Yes, Estimated age: Age Unknown  FH: breast cancer, Age at diagnosis: Age Unknown    
Warm

## 2022-12-02 NOTE — ED PROVIDER NOTE - OBJECTIVE STATEMENT
22 yr old female with PMHx of diabetes on metformin, open heart surgery s/p blood clot in heart in 2020, appendicitis, presents to the ED s/p syncope. Pt had 4 episodes according to group home([facility]). Pt woke up at the floor and only recalls it happening twice. Pt states syncope has occurred perviously. Pt complains of a mild headache. No history of seizures. Pt states that she hasn't really ate much in the past couple days and overexercises. Pt occasionally visits psychiatrist with mother. Denies smoking, EOTH and illicit drug use. Pt states that there is no chance of pregnancy and had a menstrual period a week ago. 22 yr old female with PMHx of diabetes on metformin, open heart surgery s/p blood clot in heart in 2020, appendicitis, presents to the ED s/p syncope. Pt had 4 episodes according to group home([facility]). Pt woke up at the floor and only recalls it happening twice. Pt states syncope has occurred perviously. Pt complains of a mild headache. No history of seizures. Pt states that she hasn't really ate much in the past couple days (only a armin) and over exercises. Pt occasionally visits psychiatrist with mother. Denies smoking, EOTH and illicit drug use. Pt states that there is no chance of pregnancy and had a menstrual period a week ago.

## 2022-12-02 NOTE — ED PROVIDER NOTE - GASTROINTESTINAL NEGATIVE STATEMENT, MLM
no abdominal pain, no bloating, no constipation, no diarrhea, no nausea and no vomiting.  No GI bleed

## 2022-12-05 LAB
-  AMIKACIN: SIGNIFICANT CHANGE UP
-  AMOXICILLIN/CLAVULANIC ACID: SIGNIFICANT CHANGE UP
-  AMPICILLIN/SULBACTAM: SIGNIFICANT CHANGE UP
-  AMPICILLIN: SIGNIFICANT CHANGE UP
-  AZTREONAM: SIGNIFICANT CHANGE UP
-  CEFAZOLIN: SIGNIFICANT CHANGE UP
-  CEFEPIME: SIGNIFICANT CHANGE UP
-  CEFOXITIN: SIGNIFICANT CHANGE UP
-  CEFTRIAXONE: SIGNIFICANT CHANGE UP
-  CIPROFLOXACIN: SIGNIFICANT CHANGE UP
-  ERTAPENEM: SIGNIFICANT CHANGE UP
-  GENTAMICIN: SIGNIFICANT CHANGE UP
-  IMIPENEM: SIGNIFICANT CHANGE UP
-  LEVOFLOXACIN: SIGNIFICANT CHANGE UP
-  MEROPENEM: SIGNIFICANT CHANGE UP
-  NITROFURANTOIN: SIGNIFICANT CHANGE UP
-  PIPERACILLIN/TAZOBACTAM: SIGNIFICANT CHANGE UP
-  TOBRAMYCIN: SIGNIFICANT CHANGE UP
-  TRIMETHOPRIM/SULFAMETHOXAZOLE: SIGNIFICANT CHANGE UP
CULTURE RESULTS: SIGNIFICANT CHANGE UP
METHOD TYPE: SIGNIFICANT CHANGE UP
ORGANISM # SPEC MICROSCOPIC CNT: SIGNIFICANT CHANGE UP
ORGANISM # SPEC MICROSCOPIC CNT: SIGNIFICANT CHANGE UP
SPECIMEN SOURCE: SIGNIFICANT CHANGE UP

## 2022-12-19 ENCOUNTER — EMERGENCY (EMERGENCY)
Facility: HOSPITAL | Age: 22
LOS: 0 days | Discharge: HOME | End: 2022-12-20
Attending: EMERGENCY MEDICINE
Payer: COMMERCIAL

## 2022-12-19 VITALS
HEIGHT: 65 IN | WEIGHT: 153 LBS | OXYGEN SATURATION: 100 % | SYSTOLIC BLOOD PRESSURE: 119 MMHG | HEART RATE: 68 BPM | TEMPERATURE: 98 F | RESPIRATION RATE: 17 BRPM | DIASTOLIC BLOOD PRESSURE: 83 MMHG

## 2022-12-19 DIAGNOSIS — F32.A DEPRESSION, UNSPECIFIED: ICD-10-CM

## 2022-12-19 DIAGNOSIS — T45.0X1A POISONING BY ANTIALLERGIC AND ANTIEMETIC DRUGS, ACCIDENTAL (UNINTENTIONAL), INITIAL ENCOUNTER: ICD-10-CM

## 2022-12-19 DIAGNOSIS — Z79.84 LONG TERM (CURRENT) USE OF ORAL HYPOGLYCEMIC DRUGS: ICD-10-CM

## 2022-12-19 DIAGNOSIS — Z98.890 OTHER SPECIFIED POSTPROCEDURAL STATES: Chronic | ICD-10-CM

## 2022-12-19 DIAGNOSIS — F60.3 BORDERLINE PERSONALITY DISORDER: ICD-10-CM

## 2022-12-19 DIAGNOSIS — F84.0 AUTISTIC DISORDER: ICD-10-CM

## 2022-12-19 DIAGNOSIS — Z91.018 ALLERGY TO OTHER FOODS: ICD-10-CM

## 2022-12-19 DIAGNOSIS — F41.1 GENERALIZED ANXIETY DISORDER: ICD-10-CM

## 2022-12-19 DIAGNOSIS — E11.9 TYPE 2 DIABETES MELLITUS WITHOUT COMPLICATIONS: ICD-10-CM
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
## 2022-12-19 LAB
ALBUMIN SERPL ELPH-MCNC: 3.7 G/DL — SIGNIFICANT CHANGE UP (ref 3.3–5)
ALP SERPL-CCNC: 80 U/L — SIGNIFICANT CHANGE UP (ref 40–120)
ALT FLD-CCNC: 22 U/L — SIGNIFICANT CHANGE UP (ref 12–78)
ANION GAP SERPL CALC-SCNC: 7 MMOL/L — SIGNIFICANT CHANGE UP (ref 5–17)
APAP SERPL-MCNC: < 2 UG/ML (ref 10–30)
AST SERPL-CCNC: 14 U/L — LOW (ref 15–37)
BASOPHILS # BLD AUTO: 0.03 K/UL — SIGNIFICANT CHANGE UP (ref 0–0.2)
BASOPHILS NFR BLD AUTO: 0.4 % — SIGNIFICANT CHANGE UP (ref 0–2)
BILIRUB SERPL-MCNC: 0.1 MG/DL — LOW (ref 0.2–1.2)
BUN SERPL-MCNC: 10 MG/DL — SIGNIFICANT CHANGE UP (ref 7–23)
CALCIUM SERPL-MCNC: 9.6 MG/DL — SIGNIFICANT CHANGE UP (ref 8.5–10.1)
CHLORIDE SERPL-SCNC: 106 MMOL/L — SIGNIFICANT CHANGE UP (ref 96–108)
CO2 SERPL-SCNC: 28 MMOL/L — SIGNIFICANT CHANGE UP (ref 22–31)
CREAT SERPL-MCNC: 0.92 MG/DL — SIGNIFICANT CHANGE UP (ref 0.5–1.3)
EGFR: 90 ML/MIN/1.73M2 — SIGNIFICANT CHANGE UP
EOSINOPHIL # BLD AUTO: 0.3 K/UL — SIGNIFICANT CHANGE UP (ref 0–0.5)
EOSINOPHIL NFR BLD AUTO: 3.5 % — SIGNIFICANT CHANGE UP (ref 0–6)
ETHANOL SERPL-MCNC: <10 MG/DL — SIGNIFICANT CHANGE UP (ref 0–10)
GLUCOSE SERPL-MCNC: 75 MG/DL — SIGNIFICANT CHANGE UP (ref 70–99)
HCG SERPL-ACNC: <1 MIU/ML — SIGNIFICANT CHANGE UP
HCT VFR BLD CALC: 45 % — SIGNIFICANT CHANGE UP (ref 34.5–45)
HGB BLD-MCNC: 13.9 G/DL — SIGNIFICANT CHANGE UP (ref 11.5–15.5)
IMM GRANULOCYTES NFR BLD AUTO: 0.2 % — SIGNIFICANT CHANGE UP (ref 0–0.9)
LYMPHOCYTES # BLD AUTO: 2.54 K/UL — SIGNIFICANT CHANGE UP (ref 1–3.3)
LYMPHOCYTES # BLD AUTO: 29.7 % — SIGNIFICANT CHANGE UP (ref 13–44)
MCHC RBC-ENTMCNC: 25.8 PG — LOW (ref 27–34)
MCHC RBC-ENTMCNC: 30.9 GM/DL — LOW (ref 32–36)
MCV RBC AUTO: 83.5 FL — SIGNIFICANT CHANGE UP (ref 80–100)
MONOCYTES # BLD AUTO: 0.29 K/UL — SIGNIFICANT CHANGE UP (ref 0–0.9)
MONOCYTES NFR BLD AUTO: 3.4 % — SIGNIFICANT CHANGE UP (ref 2–14)
NEUTROPHILS # BLD AUTO: 5.36 K/UL — SIGNIFICANT CHANGE UP (ref 1.8–7.4)
NEUTROPHILS NFR BLD AUTO: 62.8 % — SIGNIFICANT CHANGE UP (ref 43–77)
PLATELET # BLD AUTO: 429 K/UL — HIGH (ref 150–400)
POTASSIUM SERPL-MCNC: 3.4 MMOL/L — LOW (ref 3.5–5.3)
POTASSIUM SERPL-SCNC: 3.4 MMOL/L — LOW (ref 3.5–5.3)
PROT SERPL-MCNC: 7.9 GM/DL — SIGNIFICANT CHANGE UP (ref 6–8.3)
RBC # BLD: 5.39 M/UL — HIGH (ref 3.8–5.2)
RBC # FLD: 14.3 % — SIGNIFICANT CHANGE UP (ref 10.3–14.5)
SALICYLATES SERPL-MCNC: <1.7 MG/DL (ref 2.8–20)
SODIUM SERPL-SCNC: 141 MMOL/L — SIGNIFICANT CHANGE UP (ref 135–145)
WBC # BLD: 8.54 K/UL — SIGNIFICANT CHANGE UP (ref 3.8–10.5)
WBC # FLD AUTO: 8.54 K/UL — SIGNIFICANT CHANGE UP (ref 3.8–10.5)

## 2022-12-19 PROCEDURE — 99285 EMERGENCY DEPT VISIT HI MDM: CPT

## 2022-12-19 PROCEDURE — 84702 CHORIONIC GONADOTROPIN TEST: CPT

## 2022-12-19 PROCEDURE — 80307 DRUG TEST PRSMV CHEM ANLYZR: CPT

## 2022-12-19 PROCEDURE — 99284 EMERGENCY DEPT VISIT MOD MDM: CPT

## 2022-12-19 PROCEDURE — 93005 ELECTROCARDIOGRAM TRACING: CPT

## 2022-12-19 PROCEDURE — 85025 COMPLETE CBC W/AUTO DIFF WBC: CPT

## 2022-12-19 PROCEDURE — 93010 ELECTROCARDIOGRAM REPORT: CPT

## 2022-12-19 PROCEDURE — 80053 COMPREHEN METABOLIC PANEL: CPT

## 2022-12-19 PROCEDURE — 36415 COLL VENOUS BLD VENIPUNCTURE: CPT

## 2022-12-19 RX ORDER — SODIUM CHLORIDE 9 MG/ML
1000 INJECTION INTRAMUSCULAR; INTRAVENOUS; SUBCUTANEOUS ONCE
Refills: 0 | Status: COMPLETED | OUTPATIENT
Start: 2022-12-19 | End: 2022-12-19

## 2022-12-19 RX ADMIN — SODIUM CHLORIDE 1000 MILLILITER(S): 9 INJECTION INTRAMUSCULAR; INTRAVENOUS; SUBCUTANEOUS at 23:19

## 2022-12-19 NOTE — ED PROVIDER NOTE - PROGRESS NOTE DETAILS
case d/w Keith POISON CONTROL and recommend 4 hours observation for anticholinergic effects and will call back
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
## 2022-12-19 NOTE — ED ADULT TRIAGE NOTE - CHIEF COMPLAINT QUOTE
BIB EMS FROM [facility]. PT ON SPECTRUM TOOK 15 BENADRYL TO GET HIGH. PT A&O x4  SPEAKING IN FULL SENTENCES. STATES "I TOOK 15 BENEDRYLKS TO GET HIGH" DENIES SIHI. EKG DOEN IN TRIAGE.

## 2022-12-19 NOTE — ED PROVIDER NOTE - NSTIMEPROVIDERCAREINITIATE_GEN_ER
Future appt scheduled 02/14/2023                Last appt 08/08/2022      Last Written     lisinopril (PRINIVIL;ZESTRIL) 40 MG tablet  06/08/2022  #30  5 RF     terazosin (HYTRIN) 5 MG capsule  10/10/2022  #30  1 RF 19-Dec-2022 22:06

## 2022-12-19 NOTE — ED PROVIDER NOTE - NSFOLLOWUPINSTRUCTIONS_ED_ALL_ED_FT
please follow up with your doctor in 2-3 days.   drink plenty of fluids.   try and stop using drugs.  return to ED for any concerns

## 2022-12-19 NOTE — ED PROVIDER NOTE - PATIENT PORTAL LINK FT
You can access the FollowMyHealth Patient Portal offered by [facility] by registering at the following website: [email or web address]. By joining [employer]’s FollowMyHealth portal, you will also be able to view your health information using other applications (apps) compatible with our system.

## 2022-12-19 NOTE — ED PROVIDER NOTE - CLINICAL SUMMARY MEDICAL DECISION MAKING FREE TEXT BOX
pt with benadryl OD.   not SI/HI.   states "I was trying to get high".   pt with stable VS and no symptoms.   will check labs, EKG, poison control consult.      pt with stable VS after 5 hours OBS.   states feels fine and wants to go home.   will d/c

## 2022-12-20 VITALS
SYSTOLIC BLOOD PRESSURE: 106 MMHG | DIASTOLIC BLOOD PRESSURE: 71 MMHG | HEART RATE: 56 BPM | RESPIRATION RATE: 16 BRPM | OXYGEN SATURATION: 99 %

## 2022-12-20 NOTE — ED ADULT NURSE NOTE - CHIEF COMPLAINT QUOTE
BIB EMS FROM [facility]. PT ON SPECTRUM TOOK 15 BENADRYL TO GET HIGH. PT A&O x4  SPEAKING IN FULL SENTENCES. STATES "I TOOK 15 BENEDRYLKS TO GET HIGH" DENIES SIHI. EKG DOEN IN TRIAGE.

## 2022-12-20 NOTE — ED ADULT NURSE NOTE - OBJECTIVE STATEMENT
Pt reports recent break-up with boyfriend who is who she gets her marijuana from, per pt she didn't have it tonight and decided to take x15tab of benadryl "to get high". Pt reports she has done this in the past. Pt denies SI/HI or any hallucinations, states "I just feel calm". Pt denies HA, blurred vision, CP, SOB, or abd pain. Pt aa&ox4, vss, RN will continue to monitor

## 2022-12-29 NOTE — ED PROVIDER NOTE - CROS ED NEURO ALL NEG
----- Message from Ronald Mejia MD sent at 2/7/2022 10:25 AM CST -----  Microcytic anemia noted. Pls add iron, tibc, ferritin levels  Other labs are normal.  
----- Message from Ronald Mejia MD sent at 2/8/2022 10:12 AM CST -----  Severe FABIEN. Start ferrous sulfate 325mg daily. Increase to BID if tolerating (GI side effects). #60 with 2 refills. Will discuss further at appt next month  Pls see other lab results  
Labs added. Will call with all results.   
Message left for pt to return call regarding her lab results and recommendations.   
Patient returned the call to the nurse.   
Spoke with mom and informed of lab results and recommendations. Voiced understanding.   
CBC/CMP/Type and Screen/UCG/EKG/COVID-19
negative...

## 2023-02-07 NOTE — ED BEHAVIORAL HEALTH ASSESSMENT NOTE - SUICIDAL IDEATION DETAILS
Render Post-Care Instructions In Note?: no Duration Of Freeze Thaw-Cycle (Seconds): 5 Consent: The patient's consent was obtained including but not limited to risks of crusting, scabbing, blistering, scarring, darker or lighter pigmentary change, recurrence, incomplete removal and infection. Number Of Freeze-Thaw Cycles: 1 freeze-thaw cycle Post-Care Instructions: I reviewed with the patient in detail post-care instructions. Patient is to wear sunprotection, and avoid picking at any of the treated lesions. Pt may apply Vaseline to crusted or scabbing areas. Detail Level: Detailed Mustarde Flap Text: The defect edges were debeveled with a #15 scalpel blade.  Given the size, depth and location of the defect and the proximity to free margins a Mustarde flap was deemed most appropriate.  Using a sterile surgical marker, an appropriate flap was drawn incorporating the defect. The area thus outlined was incised with a #15 scalpel blade.  The skin margins were undermined to an appropriate distance in all directions utilizing iris scissors. thoughts of drowning self [place] canal [place] her [place]

## 2023-04-18 NOTE — DISCHARGE NOTE BEHAVIORAL HEALTH - NSBHDCSUICSAFETYFT_PSY_A_CORE
TELEMETRY
1.Safety planning reviewed multiple times daily during brief admission to hospital  2.Pt to have follow up with outpatient psychiatrist on 3/7/2022   3.Pt can contact [facility] prior to pt follow up on 3/7/2022 with any pt questions or concerns  4.Pt to receive a list of 24/7 Crisis telephone hotline # to call should future safety concerns arise  5.Pt and family aware that the pt can always return 24/7 to the nearest hospital ED for evaluation should new safety concerns arise  6.Pt and family amenable to the pt's after care treatment plan

## 2023-04-22 NOTE — ED PROVIDER NOTE - CONSTITUTIONAL NOURISHMENT, MLM
OVERWEIGHT Anemia, and thrombocytopenia similar to prior, likely 2/2 malignancy, no hx of bleeding.  Other labs and UA negative.  Given IVF with some improvement in symptoms.  Discussed with patient's oncologist.  Plan for f/u as outpatient.  Return precautions given.

## 2023-07-31 NOTE — ED STATDOCS - CARE PROVIDERS DIRECT ADDRESSES
"[facility]  PHYSICIAN NOTE    Chief Complaint   Patient presents with    skilled nursing Regulatory       HPI:    Shari Graham is a 72 year old  (1950), who is being seen today for a federally mandated E/M visit at [facility].  She admitted to LTC in Aug 2020 with progressive dementia and associated anxiety. She was receiving hospice cares Jan 2022-Nov 2022 but ultimately \"graduated\" due to stabilization at that time. Goals of care remain comfort based and she seems to have liked the broda chair which has helped her relax when she used to pace frequently otherwise.     Shari \"Soraya\" is seen in her room sitting up in her broda chair.  She has nice decorations in her room including those of the cartoon character Soraya.  I show her some family pictures which are hanging on the wall and she seems to appreciate this.  Otherwise she is not able to voice any concerns or complaints.  Willing to be wheeled out to the common area to listen in to group event.  My colleague recently spoke with her daughter last month about consideration of GDR medications but due to overall goals of care being comfort based and at times daughter noticing some anxiety, decided to keep current medication regimen unchanged.  No acute nursing concerns today.    Past Medical History:   Diagnosis Date    Dementia (H)     Hyperlipidemia LDL goal <130 08/13/2012    Hypertension         CODE STATUS: DNR/DNI    ALLERGIES: Patient has no known allergies.    MEDICATIONS: Reviewed and updated in [facility] according to facility MAR  Current Outpatient Medications   Medication Sig Dispense Refill    acetaminophen (TYLENOL) 500 MG tablet Take 1,000 mg by mouth 3 times daily      cyanocobalamin (VITAMIN B-12) 100 MCG tablet Take 100 mcg by mouth daily      divalproex sodium delayed-release (DEPAKOTE) 250 MG DR tablet Take 250 mg by mouth 2 times daily      escitalopram (LEXAPRO) 20 MG tablet Take 20 mg by mouth At Bedtime      " mirtazapine (REMERON) 15 MG tablet Take 1 tablet (15 mg) by mouth At Bedtime      OLANZapine (ZYPREXA) 2.5 MG tablet Take 2.5 mg by mouth 2 times daily @ 11 AM and 5 PM      SENNA-docusate sodium (SENNA S) 8.6-50 MG tablet Take 1 tablet by mouth 2 times daily as needed (constipation)      Vitamin D, Cholecalciferol, 25 MCG (1000 UT) CAPS Take 1,000 Units by mouth daily         ROS:  Unobtainable secondary to cognitive impairment.     Exam:  /80   Pulse 66   Temp 97.2  F (36.2  C)   Resp 18   Wt 63.5 kg (140 lb)   SpO2 98%   BMI 23.30 kg/m    Alert, casually dressed, well-groomed, sitting up in Broda chair  Breathing unlabored  Heart tones regular  No edema  Mumbles some word salad, seems to appreciate seeing pictures of her family in her room    Lab/Diagnostic Data:    No routine labs per family request given comfort goals of care    ASSESSMENT/PLAN:  Severe Alzheimer's dementia with anxiety, unspecified timing of dementia onset (H)  Weight stable  Continue current medications as noted above in HPI with goals of care comfort based as well as support of family/staff  She is often out listening to group events  Also had recent on site dentist appointment and working with wellness on hand massage      Electronically signed by:  Lurdes Sinclair DO   ,DirectAddress_Unknown

## 2023-08-10 ENCOUNTER — EMERGENCY (EMERGENCY)
Facility: HOSPITAL | Age: 23
LOS: 0 days | Discharge: HOME | End: 2023-08-10
Attending: EMERGENCY MEDICINE
Payer: COMMERCIAL

## 2023-08-10 ENCOUNTER — EMERGENCY (EMERGENCY)
Facility: HOSPITAL | Age: 23
LOS: 0 days | Discharge: HOME | End: 2023-08-10
Attending: HOSPITALIST
Payer: COMMERCIAL

## 2023-08-10 VITALS
HEIGHT: 65 IN | RESPIRATION RATE: 18 BRPM | HEART RATE: 58 BPM | TEMPERATURE: 99 F | OXYGEN SATURATION: 92 % | SYSTOLIC BLOOD PRESSURE: 122 MMHG | DIASTOLIC BLOOD PRESSURE: 49 MMHG | WEIGHT: 153 LBS

## 2023-08-10 VITALS
SYSTOLIC BLOOD PRESSURE: 111 MMHG | TEMPERATURE: 98 F | HEART RATE: 79 BPM | RESPIRATION RATE: 18 BRPM | DIASTOLIC BLOOD PRESSURE: 68 MMHG | OXYGEN SATURATION: 99 %

## 2023-08-10 VITALS
TEMPERATURE: 98 F | OXYGEN SATURATION: 99 % | SYSTOLIC BLOOD PRESSURE: 107 MMHG | HEART RATE: 94 BPM | DIASTOLIC BLOOD PRESSURE: 66 MMHG | RESPIRATION RATE: 16 BRPM

## 2023-08-10 DIAGNOSIS — F32.A DEPRESSION, UNSPECIFIED: ICD-10-CM

## 2023-08-10 DIAGNOSIS — F60.3 BORDERLINE PERSONALITY DISORDER: ICD-10-CM

## 2023-08-10 DIAGNOSIS — R45.1 RESTLESSNESS AND AGITATION: ICD-10-CM

## 2023-08-10 DIAGNOSIS — Z98.890 OTHER SPECIFIED POSTPROCEDURAL STATES: Chronic | ICD-10-CM

## 2023-08-10 DIAGNOSIS — R41.82 ALTERED MENTAL STATUS, UNSPECIFIED: ICD-10-CM

## 2023-08-10 DIAGNOSIS — F41.1 GENERALIZED ANXIETY DISORDER: ICD-10-CM

## 2023-08-10 DIAGNOSIS — F90.9 ATTENTION-DEFICIT HYPERACTIVITY DISORDER, UNSPECIFIED TYPE: ICD-10-CM

## 2023-08-10 DIAGNOSIS — Z79.84 LONG TERM (CURRENT) USE OF ORAL HYPOGLYCEMIC DRUGS: ICD-10-CM

## 2023-08-10 DIAGNOSIS — F10.10 ALCOHOL ABUSE, UNCOMPLICATED: ICD-10-CM

## 2023-08-10 DIAGNOSIS — R07.89 OTHER CHEST PAIN: ICD-10-CM

## 2023-08-10 DIAGNOSIS — R45.851 SUICIDAL IDEATIONS: ICD-10-CM

## 2023-08-10 DIAGNOSIS — F43.10 POST-TRAUMATIC STRESS DISORDER, UNSPECIFIED: ICD-10-CM

## 2023-08-10 DIAGNOSIS — F84.0 AUTISTIC DISORDER: ICD-10-CM

## 2023-08-10 DIAGNOSIS — Z91.018 ALLERGY TO OTHER FOODS: ICD-10-CM

## 2023-08-10 DIAGNOSIS — R42 DIZZINESS AND GIDDINESS: ICD-10-CM

## 2023-08-10 DIAGNOSIS — Z91.51 PERSONAL HISTORY OF SUICIDAL BEHAVIOR: ICD-10-CM

## 2023-08-10 DIAGNOSIS — E11.9 TYPE 2 DIABETES MELLITUS WITHOUT COMPLICATIONS: ICD-10-CM

## 2023-08-10 DIAGNOSIS — Z20.822 CONTACT WITH AND (SUSPECTED) EXPOSURE TO COVID-19: ICD-10-CM

## 2023-08-10 DIAGNOSIS — F41.9 ANXIETY DISORDER, UNSPECIFIED: ICD-10-CM
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
## 2023-08-10 LAB
ALBUMIN SERPL ELPH-MCNC: 3.6 G/DL — SIGNIFICANT CHANGE UP (ref 3.3–5)
ALP SERPL-CCNC: 75 U/L — SIGNIFICANT CHANGE UP (ref 40–120)
ALT FLD-CCNC: 19 U/L — SIGNIFICANT CHANGE UP (ref 12–78)
AMPHET UR-MCNC: NEGATIVE — SIGNIFICANT CHANGE UP
ANION GAP SERPL CALC-SCNC: 6 MMOL/L — SIGNIFICANT CHANGE UP (ref 5–17)
APAP SERPL-MCNC: < 2 UG/ML (ref 10–30)
AST SERPL-CCNC: 11 U/L — LOW (ref 15–37)
BARBITURATES UR SCN-MCNC: NEGATIVE — SIGNIFICANT CHANGE UP
BASOPHILS # BLD AUTO: 0 K/UL — SIGNIFICANT CHANGE UP (ref 0–0.2)
BASOPHILS NFR BLD AUTO: 0 % — SIGNIFICANT CHANGE UP (ref 0–2)
BENZODIAZ UR-MCNC: NEGATIVE — SIGNIFICANT CHANGE UP
BILIRUB SERPL-MCNC: 0.4 MG/DL — SIGNIFICANT CHANGE UP (ref 0.2–1.2)
BUN SERPL-MCNC: 9 MG/DL — SIGNIFICANT CHANGE UP (ref 7–23)
CALCIUM SERPL-MCNC: 9.5 MG/DL — SIGNIFICANT CHANGE UP (ref 8.5–10.1)
CHLORIDE SERPL-SCNC: 109 MMOL/L — HIGH (ref 96–108)
CO2 SERPL-SCNC: 24 MMOL/L — SIGNIFICANT CHANGE UP (ref 22–31)
COCAINE METAB.OTHER UR-MCNC: NEGATIVE — SIGNIFICANT CHANGE UP
CREAT SERPL-MCNC: 1.41 MG/DL — HIGH (ref 0.5–1.3)
EGFR: 53 ML/MIN/1.73M2 — LOW
EOSINOPHIL # BLD AUTO: 0 K/UL — SIGNIFICANT CHANGE UP (ref 0–0.5)
EOSINOPHIL NFR BLD AUTO: 0 % — SIGNIFICANT CHANGE UP (ref 0–6)
ETHANOL SERPL-MCNC: 37 MG/DL — HIGH (ref 0–10)
GLUCOSE SERPL-MCNC: 74 MG/DL — SIGNIFICANT CHANGE UP (ref 70–99)
HCG SERPL-ACNC: <1 MIU/ML — SIGNIFICANT CHANGE UP
HCT VFR BLD CALC: 39.2 % — SIGNIFICANT CHANGE UP (ref 34.5–45)
HGB BLD-MCNC: 12.6 G/DL — SIGNIFICANT CHANGE UP (ref 11.5–15.5)
IMM GRANULOCYTES NFR BLD AUTO: 0.3 % — SIGNIFICANT CHANGE UP (ref 0–0.9)
LITHIUM SERPL-MCNC: 1.5 MMOL/L — HIGH (ref 0.6–1.2)
LYMPHOCYTES # BLD AUTO: 2.17 K/UL — SIGNIFICANT CHANGE UP (ref 1–3.3)
LYMPHOCYTES # BLD AUTO: 20 % — SIGNIFICANT CHANGE UP (ref 13–44)
MCHC RBC-ENTMCNC: 27.3 PG — SIGNIFICANT CHANGE UP (ref 27–34)
MCHC RBC-ENTMCNC: 32.1 GM/DL — SIGNIFICANT CHANGE UP (ref 32–36)
MCV RBC AUTO: 84.8 FL — SIGNIFICANT CHANGE UP (ref 80–100)
METHADONE UR-MCNC: NEGATIVE — SIGNIFICANT CHANGE UP
MONOCYTES # BLD AUTO: 0.38 K/UL — SIGNIFICANT CHANGE UP (ref 0–0.9)
MONOCYTES NFR BLD AUTO: 3.5 % — SIGNIFICANT CHANGE UP (ref 2–14)
NEUTROPHILS # BLD AUTO: 8.26 K/UL — HIGH (ref 1.8–7.4)
NEUTROPHILS NFR BLD AUTO: 76.2 % — SIGNIFICANT CHANGE UP (ref 43–77)
OPIATES UR-MCNC: NEGATIVE — SIGNIFICANT CHANGE UP
PCP SPEC-MCNC: SIGNIFICANT CHANGE UP
PCP UR-MCNC: NEGATIVE — SIGNIFICANT CHANGE UP
PLATELET # BLD AUTO: 289 K/UL — SIGNIFICANT CHANGE UP (ref 150–400)
POTASSIUM SERPL-MCNC: 3.8 MMOL/L — SIGNIFICANT CHANGE UP (ref 3.5–5.3)
POTASSIUM SERPL-SCNC: 3.8 MMOL/L — SIGNIFICANT CHANGE UP (ref 3.5–5.3)
PROT SERPL-MCNC: 7.2 GM/DL — SIGNIFICANT CHANGE UP (ref 6–8.3)
RBC # BLD: 4.62 M/UL — SIGNIFICANT CHANGE UP (ref 3.8–5.2)
RBC # FLD: 13.8 % — SIGNIFICANT CHANGE UP (ref 10.3–14.5)
SALICYLATES SERPL-MCNC: <1.7 MG/DL — LOW (ref 2.8–20)
SARS-COV-2 RNA SPEC QL NAA+PROBE: SIGNIFICANT CHANGE UP
SODIUM SERPL-SCNC: 139 MMOL/L — SIGNIFICANT CHANGE UP (ref 135–145)
THC UR QL: NEGATIVE — SIGNIFICANT CHANGE UP
WBC # BLD: 10.84 K/UL — HIGH (ref 3.8–10.5)
WBC # FLD AUTO: 10.84 K/UL — HIGH (ref 3.8–10.5)

## 2023-08-10 PROCEDURE — 99285 EMERGENCY DEPT VISIT HI MDM: CPT

## 2023-08-10 PROCEDURE — 85025 COMPLETE CBC W/AUTO DIFF WBC: CPT

## 2023-08-10 PROCEDURE — 80053 COMPREHEN METABOLIC PANEL: CPT

## 2023-08-10 PROCEDURE — 80178 ASSAY OF LITHIUM: CPT

## 2023-08-10 PROCEDURE — 71045 X-RAY EXAM CHEST 1 VIEW: CPT | Mod: 26

## 2023-08-10 PROCEDURE — 87635 SARS-COV-2 COVID-19 AMP PRB: CPT

## 2023-08-10 PROCEDURE — 80307 DRUG TEST PRSMV CHEM ANLYZR: CPT

## 2023-08-10 PROCEDURE — 93005 ELECTROCARDIOGRAM TRACING: CPT

## 2023-08-10 PROCEDURE — 90792 PSYCH DIAG EVAL W/MED SRVCS: CPT | Mod: 95

## 2023-08-10 PROCEDURE — L9997: CPT

## 2023-08-10 PROCEDURE — 71045 X-RAY EXAM CHEST 1 VIEW: CPT

## 2023-08-10 PROCEDURE — 84702 CHORIONIC GONADOTROPIN TEST: CPT

## 2023-08-10 PROCEDURE — 99285 EMERGENCY DEPT VISIT HI MDM: CPT | Mod: 25

## 2023-08-10 PROCEDURE — 36415 COLL VENOUS BLD VENIPUNCTURE: CPT

## 2023-08-10 PROCEDURE — 93010 ELECTROCARDIOGRAM REPORT: CPT

## 2023-08-10 RX ORDER — SODIUM CHLORIDE 9 MG/ML
1000 INJECTION INTRAMUSCULAR; INTRAVENOUS; SUBCUTANEOUS ONCE
Refills: 0 | Status: COMPLETED | OUTPATIENT
Start: 2023-08-10 | End: 2023-08-10

## 2023-08-10 RX ORDER — ALPRAZOLAM 0.25 MG
0.5 TABLET ORAL ONCE
Refills: 0 | Status: DISCONTINUED | OUTPATIENT
Start: 2023-08-10 | End: 2023-08-10

## 2023-08-10 RX ADMIN — SODIUM CHLORIDE 1000 MILLILITER(S): 9 INJECTION INTRAMUSCULAR; INTRAVENOUS; SUBCUTANEOUS at 02:46

## 2023-08-10 RX ADMIN — Medication 0.5 MILLIGRAM(S): at 04:41
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
## 2023-08-10 NOTE — ED ADULT NURSE NOTE - CHIEF COMPLAINT
Detail Level: Simple Detail Level: Zone Detail Level: Generalized The patient is a 23y Female complaining of suicidal thoughts.

## 2023-08-10 NOTE — ED ADULT NURSE NOTE - NSFALLUNIVINTERV_ED_ALL_ED
Bed/Stretcher in lowest position, wheels locked, appropriate side rails in place/Call bell, personal items and telephone in reach/Instruct patient to call for assistance before getting out of bed/chair/stretcher/Non-slip footwear applied when patient is off stretcher/Rowley to call system/Physically safe environment - no spills, clutter or unnecessary equipment/Purposeful proactive rounding/Room/bathroom lighting operational, light cord in reach

## 2023-08-10 NOTE — ED PROVIDER NOTE - CLINICAL SUMMARY MEDICAL DECISION MAKING FREE TEXT BOX
24 y/o female (identifies with He/His, named Dahiana ) w/PMHx of DM, ADHD, autism, blood clots, JEREMY, borderline personality disorder depression BIBEMS c/o SI, expressed thoughts of jumping out of car. plan: 1:1 observation, psych consult (NP aware). 24 y/o female (identifies with He/His, named Dahiana ) w/PMHx of DM, ADHD, autism, blood clots, JEREMY, borderline personality disorder depression BIBEMS c/o SI, expressed thoughts of jumping out of car.   Plan: 1:1 observation, psych consult (NP aware).    16:44, C MD Oseas:  Verbal de-escalation by Psych NP + successful, no meds indicated.  Pt cleared by Psychiatry for DC back to group home, no further intervention indicated. 22 y/o female (identifies with He/His, named Dahiana ) w/PMHx of DM, ADHD, autism, blood clots, JEREMY, borderline personality disorder depression BIBEMS c/o SI, expressed thoughts of jumping out of car. Pt agitated, anxious, poorly cooperative though not overtly hostile.  Verbal de-escalation by MD Farooq not successful, pt w/o being triggered by males.  Plan: 1:1 observation, psych consult (NP aware).    16:44, PANTERA Farooq MD:  Verbal de-escalation by Psych NP + successful, no meds indicated.  Pt cleared by Psychiatry for DC back to group home, no further intervention indicated.

## 2023-08-10 NOTE — ED PROVIDER NOTE - NSFOLLOWUPINSTRUCTIONS_ED_ALL_ED_FT
Please note that you were evaluated in the emergency room by the psychiatrist and that they wish for you to go home, and that you wish to go back. If you have any health concerns return to us immediately. Please contact your psychiatrist as soon as possible or the doctor prescribing you the Lithium to make sure they do more close monitoring of the level to ensure your levels are normal. If any other health concerns or if any depression or anxiety please return to us immediately.   ___________  Managing Depression, Adult  Depression is a mental health condition that affects your thoughts, feelings, and actions. Being diagnosed with depression can bring you relief if you did not know why you have felt or behaved a certain way. It could also leave you feeling overwhelmed with uncertainty about your future. Preparing yourself to manage your symptoms can help you feel more positive about your future.    How to manage lifestyle changes  Managing stress      Stress is your body's reaction to life changes and events, both good and bad. Stress can add to your feelings of depression. Learning to manage your stress can help lessen your feelings of depression.    Try some of the following approaches to reducing your stress (stress reduction techniques):  Listen to music that you enjoy and that inspires you.  Try using a meditation raymond or take a meditation class.  Develop a practice that helps you connect with your spiritual self. Walk in nature, pray, or go to a place of Scientologist.  Do some deep breathing. To do this, inhale slowly through your nose. Pause at the top of your inhale for a few seconds and then exhale slowly, letting your muscles relax.  Practice yoga to help relax and work your muscles.  Choose a stress reduction technique that suits your lifestyle and personality. These techniques take time and practice to develop. Set aside 5–15 minutes a day to do them. Therapists can offer training in these techniques. Other things you can do to manage stress include:  Keeping a stress diary.  Knowing your limits and saying no when you think something is too much.  Paying attention to how you react to certain situations. You may not be able to control everything, but you can change your reaction.  Adding humor to your life by watching funny films or TV shows.  Making time for activities that you enjoy and that relax you.  Medicines    Medicines, such as antidepressants, are often a part of treatment for depression.  Talk with your pharmacist or health care provider about all the medicines, supplements, and herbal products that you take, their possible side effects, and what medicines and other products are safe to take together.  Make sure to report any side effects you may have to your health care provider.  Relationships    Your health care provider may suggest family therapy, couples therapy, or individual therapy as part of your treatment.    How to recognize changes  Everyone responds differently to treatment for depression. As you recover from depression, you may start to:  Have more interest in doing activities.  Feel less hopeless.  Have more energy.  Overeat less often, or have a better appetite.  Have better mental focus.  It is important to recognize if your depression is not getting better or is getting worse. The symptoms you had in the beginning may return, such as:  Tiredness (fatigue) or low energy.  Eating too much or too little.  Sleeping too much or too little.  Feeling restless, agitated, or hopeless.  Trouble focusing or making decisions.  Unexplained physical complaints.  Feeling irritable, angry, or aggressive.  If you or your family members notice these symptoms coming back, let your health care provider know right away.    Follow these instructions at home:  Activity      Try to get some form of exercise each day, such as walking, biking, swimming, or lifting weights.  Practice stress reduction techniques.  Engage your mind by taking a class or doing some volunteer work.  Lifestyle    Get the right amount and quality of sleep.  Cut down on using caffeine, tobacco, alcohol, and other potentially harmful substances.  Eat a healthy diet that includes plenty of vegetables, fruits, whole grains, low-fat dairy products, and lean protein. Do not eat a lot of foods that are high in solid fats, added sugars, or salt (sodium).  General instructions    Take over-the-counter and prescription medicines only as told by your health care provider.  Keep all follow-up visits as told by your health care provider. This is important.  Where to find support  Talking to others      Friends and family members can be sources of support and guidance. Talk to trusted friends or family members about your condition. Explain your symptoms to them, and let them know that you are working with a health care provider to treat your depression. Tell friends and family members how they also can be helpful.    Finances    Find appropriate mental health providers that fit with your financial situation.  Talk with your health care provider about options to get reduced prices on your medicines.  Where to find more information  You can find support in your area from:  Anxiety and Depression Association of [place] (ADAA): www.adaa.org  Mental Health [place]: www.mentalhealthamerica.net  National [place] on Mental Illness: www.felicia.org  Contact a health care provider if:  You stop taking your antidepressant medicines, and you have any of these symptoms:  Nausea.  Headache.  Light-headedness.  Chills and body aches.  Not being able to sleep (insomnia).  You or your friends and family think your depression is getting worse.  Get help right away if:  You have thoughts of hurting yourself or others.  If you ever feel like you may hurt yourself or others, or have thoughts about taking your own life, get help right away. Go to your nearest emergency department or:  Call your local emergency services (826 in the [place]  Call a suicide crisis helpline, such as the National Suicide Prevention Lifeline at 1-966.531.2823 or 750 in the [place] This is open 24 hours a day in the [place]  Text the Crisis Text Line at 363861 (in the [place]  Summary  If you are diagnosed with depression, preparing yourself to manage your symptoms is a good way to feel positive about your future.  Work with your health care provider on a management plan that includes stress reduction techniques, medicines (if applicable), therapy, and healthy lifestyle habits.  Keep talking with your health care provider about how your treatment is working.  If you have thoughts about taking your own life, call a suicide crisis helpline or text a crisis text line.  This information is not intended to replace advice given to you by your health care provider. Make sure you discuss any questions you have with your health care provider.

## 2023-08-10 NOTE — ED BEHAVIORAL HEALTH PROGRESS NOTE - CURRENT PLAN:
----- Message from Rachelle Talbot MD sent at 5/27/2020  7:31 AM CDT -----  Please convey to daughter in law Latasha  CBC with anemia of 11.0 which is pretty good and close to her baseline. Iron levels ok. Would still recommend heme/onc f/u.  Vit D ok  Thyroid ok  Magnesium ok  CMP with good liver. There is slight decrease in kidney function. Please have them repeat BMP when they see Dr. Montes De Oca and we would need to trend. Nothing I am too alarmed at at the moment. Stay very well hydrated. Avoid NSAIDs   None known

## 2023-08-10 NOTE — ED PROVIDER NOTE - NEUROLOGICAL, MLM
Alert and oriented, no focal deficits, no motor or sensory deficits. Alert and oriented, no focal deficits, no motor nor sensory deficits.

## 2023-08-10 NOTE — ED BEHAVIORAL HEALTH ASSESSMENT NOTE - NSSUICPROTFACT_PSY_ALL_CORE
Responsibility to children, family, or others/Identifies reasons for living/Supportive social network of family or friends/Fear of death or the actual act of killing self/Other

## 2023-08-10 NOTE — ED ADULT NURSE NOTE - CHIEF COMPLAINT QUOTE
Patient presents to the ER with complaints of suicidal ideation. Patient states "I have no specific plan but I had thoughts earlier of jumping out of a car." Patient denies HI at this time. Patient previously here in [facility]. SCPD 4017 in triage.

## 2023-08-10 NOTE — ED BEHAVIORAL HEALTH PROGRESS NOTE - CASE SUMMARY/FORMULATION (CLEARLY DOCUMENT RATIONALE FOR DISPOSITION CHANGE)
Patient seen at bedside this AM patient is calm and cooperative, pleasant; linear, organized. Reports last night he was triggered by new staff members at group home and went to [employer] to buy alcohol to "calm his feelings" and self medicate, which he does often when he feels trigger. At present patient denies and suicidal ideation/intent or plan, they are able to complete safety plan and reports feeling "much better." Reports good sleep and appetite. Denies depressed mood or anhedonia, or hopelessness. Denies manic / psychotic symptoms. No delusions elicited. Patient has no presence of thought disorder. Tolerating medications with no side effects; none observed.     At the time of psychiatric reassessment, patient is well engaged, organized, euthymic and does not objectively evidence clinical signs of depressive symptomatology, arturo, psychosis, suicidality or homicidality. As noted on previous ED presentations he history of substance abuse, behavioral disturbance and mood lability, which in reviewing longitudinal history is most concerning for borderline characterological disorder. This disorder is marked by frequent behavioral dysregulation in response to minor distress due to poor impulse control, poor frustration tolerance and ineffective utilization of (or absence of) coping skills. The treatment of choice for this is longitudinal psychotherapy, coupled with environmental provisions (i.e. parental discipline, boundary enforcement, and empathic validation when appropriate). Patient does not evidence an imminent risk for suicide or homicide or an inability to care for self meeting the threshold for hospitalization. It is also noted that any contributing chronic psychiatric conditions are currently being addressed accordingly on an outpatient basis and there is inadequate evidence to suggest the course of these conditions would be altered with inpatient hospitalization at this time. As such, he is psychiatrically cleared for discharge back to group home residence.
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
## 2023-08-10 NOTE — ED PROVIDER NOTE - OBJECTIVE STATEMENT
23-year-old female (identifies with He/His) presenting with chest heaviness and lightheadedness.  Patient admits to drinking a couple of alcoholic beverages earlier in the evening which preceded his symptoms.  Alerted someone at the group home where he lives that he was not feeling well who brought him to the ED.  Patient denies any cough or shortness of breath.  Adds that he is feeling really depressed right now and  having thoughts of suicide.  No plan.  No hallucinations.
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
## 2023-08-10 NOTE — ED BEHAVIORAL HEALTH ASSESSMENT NOTE - DESCRIPTION
See BT note    [employer] checked:      PDI	My Rx	Current Rx	Drug Type	Rx Written	Rx Dispensed	Drug	Quantity	Days Supply	Prescriber Name	Prescriber AGUSTO #	Payment Method	Dispenser  A	N	N	S	07/05/2023	07/05/2023	methylphenidate 5 mg tablet	30	30	Perret, Damian ROMERO MD	[id number]	Insurance	[id number]  A	N	N	S	07/05/2023	07/05/2023	concerta er 36 mg tablet	30	30	Perret, Damian ROMERO MD	[id number]	Insurance	[id number]  A	N	N	S	07/05/2023	07/05/2023	methylphenidate 10 mg tablet	30	30	Perret, Damian ROMERO MD	[id number]	Insurance	[id number]  A	N	N	S	06/02/2023	06/02/2023	concerta er 36 mg tablet	30	30	Perret, Damian ROMERO MD	[id number]	Insurance	[id number]  A	N	N	S	06/02/2023	06/02/2023	methylphenidate 5 mg tablet	30	30	Perret, Damian ROMERO MD	[id number]	Insurance	[id number]  A	N	N	S	06/02/2023	06/02/2023	methylphenidate 10 mg tablet	30	30	Perret, Damian ROMERO MD	[id number]	Insurance	[id number]  A	N	N	S	05/03/2023	05/06/2023	methylphenidate 10 mg tablet	30	30	Perret, Damian ROMERO MD	[id number]	Insurance	[id number] See hpi

## 2023-08-10 NOTE — ED PROVIDER NOTE - PATIENT PORTAL LINK FT
You can access the FollowMyHealth Patient Portal offered by [facility] by registering at the following website: [email or web address]. By joining [employer]’s FollowMyHealth portal, you will also be able to view your health information using other applications (apps) compatible with our system.

## 2023-08-10 NOTE — ED PROVIDER NOTE - PHYSICAL EXAMINATION
***GEN - NAD; well appearing; A+O x3 ***HEAD - NC/AT ***EYES/NOSE - PERRL, EOMI, mucous membranes moist, no discharge ***THROAT: Oral cavity and pharynx normal. No inflammation, swelling, exudate, or lesions.  ***NECK: Neck supple, non-tender   ***PULMONARY - CTA b/l, symmetric breath sounds. ***CARDIAC -s1s2, RRR, no M,G,R  ***ABDOMEN - +BS, ND, NT, soft, no guarding  ***BACK - no CVA tenderness, Normal  spine ***EXTREMITIES - symmetric pulses, 2+ dp, capillary refill < 2 seconds ***SKIN - no rash or bruising   ***NEUROLOGIC - alert, CN 2-12 intact   ***PSYCH-   Normal affect and making eye contact.  No HI or hallucinations.+  SI

## 2023-08-10 NOTE — ED BEHAVIORAL HEALTH ASSESSMENT NOTE - PSYCHIATRIC ISSUES AND PLAN (INCLUDE STANDING AND PRN MEDICATION)
For agitation, can offer PO Haldol 5 mg q6H PRN. If refusing PO and is in acute risk of harm, can escalate to IM. If given, please obtain repeat ecg and hold antipsychotics if QTC>500 Recommend calling  to confirm med regimen(staff after hours did not know the exact meds or doses). Can reach residence at [phone]. For agitation, can offer PO Haldol 5 mg q6H PRN. If refusing PO and is in acute risk of harm, can escalate to IM. If given, please obtain repeat ecg and hold antipsychotics if QTC>500

## 2023-08-10 NOTE — ED PROVIDER NOTE - CLINICAL SUMMARY MEDICAL DECISION MAKING FREE TEXT BOX
23-year-old female (he/his) with chest discomfort and SI.  Patient with little to no risk factors for ACS however will obtain labs EKG and chest x-ray.  Will also have psychiatry evaluate.  Labs noted as well as EKG.  Nonspecific chest heaviness.  Spoke with telepsych who will evaluate patient.  Patient on one-to-one observation for safety. 23-year-old female (he/his) with chest discomfort and SI.  Patient with little to no risk factors for ACS however will obtain labs EKG and chest x-ray.  Will also have psychiatry evaluate.  Labs noted as well as EKG.  Nonspecific chest heaviness.  Spoke with telepsych who will evaluate patient.  Patient on one-to-one observation for safety. Patient evaluated by Telepsych. not cleared at this time. Recommending re-eval in person in the AM by day team staff and haldol prn as needed for agitation. Will hold in ED for re-eval.

## 2023-08-10 NOTE — ED BEHAVIORAL HEALTH PROGRESS NOTE - NSBHMSEGAIT_PSY_A_CORE
Medication: Lisinopril  Last visit: 04/29/21  Next visit: 6/22/2021  Last refill: 04/29/21  Pharmacy: [facility]
Unable to assess
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
## 2023-08-10 NOTE — ED PROVIDER NOTE - CARE PROVIDER_API CALL
Kenneth Boss  Geriatric Psychiatry  [address] [place] [address], [address] [place] [address]  Phone: [phone]  Fax: [phone]  Follow Up Time:

## 2023-08-10 NOTE — ED PROVIDER NOTE - NSFOLLOWUPINSTRUCTIONS_ED_ALL_ED_FT
Continue your current medications as per routine.  If chaperone, observation indicated, please avoid with any male who's tall, muscular.  Dahiana is cleared to resume Programs.    Follow depression & managing stress instructions as provided earlier today's discharge. Continue your current medications as per routine.  If chaperone, observation indicated, please avoid with any male who's tall, muscular.  Dahiana is cleared to resume Programs.    Follow depression & managing stress instructions as provided earlier today's discharge.    Follow up with own psychiatrist.

## 2023-08-10 NOTE — ED BEHAVIORAL HEALTH ASSESSMENT NOTE - RISK ASSESSMENT
Chronic rf: prior psych hx/admissions, prior interrupted suicide attempts  Modifiable rf: stress tied to living situation, inability to safety plan, SI  PF: Support from  staff, on meds, connected to care, no e/o psychosis or arturo

## 2023-08-10 NOTE — ED PROVIDER NOTE - PROGRESS NOTE DETAILS
Zeenat REID: Signed out given to me by Dr. Frye pending assessment by psych. Patient was cleared at the time of the admission. Patient seen by psych and spoke with Psych NP who clears patient to go home. Residents from home on their way to give patient a ride. Patient is comfortable. VSS, nontoxic appearing, wishes to go home/back to residence.

## 2023-08-10 NOTE — ED ADULT TRIAGE NOTE - GLASGOW COMA SCALE: SCORE, MLM
"Face to face shift report received from nurse. Rounding completed, pt observed.    Problem: Adult Behavioral Health Plan of Care  Goal: Patient-Specific Goal (Individualization)  Description: Patient will sleep 6-8 hours a night  Patient will attend 50% of unit programming when able  Patient will complete ADLs independently  Patient will be compliant with treatment team recommendations    5/10/2020 0747 by Juan Sainz, RN  Note: Patient is calm and cooperative. Atarax @ 1055 for anxiety. Independent with ADL's. Patient is compliant with treatment team. Patient has double food, but is still hungry. Anxiety is higher then yesterday during the day shift. Danyelle BROWN added cloNIDine 0.1mg at 1310 for anxiety. Patient was complaining of \"skin crawling\". Took SEROquel at 1318 for anxiety.    Problem: Thought Process Alteration  Goal: Optimal Thought Clarity  Description: Patient will verbalize a decrease in hallucinations by time of discharge.  Patient will be able to have a linear, logical conversation by time of discharge.  Patient will verbalize 2-3 coping skills by time of discharge.  5/10/2020 0711 by Juan Sainz, RN  Outcome: Improving  Patient is able to have clear linear conversations.      Face to face report will be communicated to oncoming RN.    Juan GARCIA. FUNMILAYO Sainz  5/10/2020  1459 PM    " 15

## 2023-08-10 NOTE — ED PROVIDER NOTE - NSICDXPASTMEDICALHX_GEN_ALL_CORE_FT
Biopsy Method: Dermablade PAST MEDICAL HISTORY:  Autism     Borderline personality disorder     Depression     Diabetes     JEREMY (generalized anxiety disorder)     H/O blood clots

## 2023-08-10 NOTE — ED BEHAVIORAL HEALTH ASSESSMENT NOTE - PATIENT'S CHIEF COMPLAINT
"I made a stupid joke about being with my grandfather and everyone freaked out. I have borderline I have trouble with my emotions, I would."
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
## 2023-08-10 NOTE — ED ADULT NURSE NOTE - OBJECTIVE STATEMENT
Patient presents to the ER with complaints of suicidal ideation. Patient states "I have no specific plan but I had thoughts earlier of jumping out of a car." Patient denies HI at this time. Patient previously here in [facility]. patient states she feels "triggered by males".
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
## 2023-08-10 NOTE — ED BEHAVIORAL HEALTH PROGRESS NOTE - DETAILS
See hpi T&R Patient instructed to return to the ED or call 911 if patient experiences SI, HI, hopelessness, worsening of symptoms or has any other concerns.     See Safety plan note

## 2023-08-10 NOTE — ED PROVIDER NOTE - PATIENT PORTAL LINK FT
You can access the FollowMyHealth Patient Portal offered by [facility] by registering at the following website: [email or web address]. By joining [employer]’s FollowMyHealth portal, you will also be able to view your health information using other applications (apps) compatible with our system.

## 2023-08-10 NOTE — ED BEHAVIORAL HEALTH PROGRESS NOTE - NSSUICPROTFACT_PSY_ALL_CORE
Identifies reasons for living/Supportive social network of family or friends/Positive therapeutic relationships/Other
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
## 2023-08-10 NOTE — ED BEHAVIORAL HEALTH NOTE - BEHAVIORAL HEALTH NOTE
===================     PRE-HOSPITAL COURSE     ===================     SOURCE: RN and Secondhand ED documentation      DETAILS: BIB EMS from group home     ============     ED COURSE     ============     SOURCE: RN and Secondhand ED documentation      ARRIVAL: BIB EMS      BELONGINGS: No belongings of note. All belongings were provided to hospital security, and patient currently in a gown with a 1:1 staff member.     BEHAVIOR: Per RN, pt has been calm, cooperative and in behavioral control. Per chart, pt's preferred pronouns "He/him". RN reports pt presented originally for consuming alcohol and having chest pains; however when RN probed further pt endorsed SI no plan or intent. RN reports pt is AOx4, linear thought process, sporadic eye contact and fair grooming/hygiene. RN denies active SI in ED.      TREATMENT: Pt was given fluids      VISITORS: Staff member in waiting

## 2023-08-10 NOTE — ED ADULT TRIAGE NOTE - CHIEF COMPLAINT QUOTE
Patient presents to the ER with complaints of suicidal ideation. Patient states "I have no specific plan but I had thoughts earlier of jumping out of a car." Patient denies HI at this time. Patient previously here in [facility]. SCPD [id number] in triage.

## 2023-08-10 NOTE — ED BEHAVIORAL HEALTH ASSESSMENT NOTE - DESCRIPTION
See BT note    [facility]Stop checked:      PDI	My Rx	Current Rx	Drug Type	Rx Written	Rx Dispensed	Drug	Quantity	Days Supply	Prescriber Name	Prescriber AGUSTO #	Payment Method	Dispenser  A	N	N	S	07/05/2023	07/05/2023	methylphenidate 5 mg tablet	30	30	Perret, Damian ROMERO MD	[id number]	Insurance	[id number]  A	N	N	S	07/05/2023	07/05/2023	concerta er 36 mg tablet	30	30	Perret, Damian ROMERO MD	[id number]	Insurance	[id number]  A	N	N	S	07/05/2023	07/05/2023	methylphenidate 10 mg tablet	30	30	Perret, Damian ROMERO MD	[id number]	Insurance	[id number]  A	N	N	S	06/02/2023	06/02/2023	concerta er 36 mg tablet	30	30	Perret, Damian ROMERO MD	[id number]	Insurance	[id number]  A	N	N	S	06/02/2023	06/02/2023	methylphenidate 5 mg tablet	30	30	Perret, Damian ROMERO MD	[id number]	Insurance	[id number]  A	N	N	S	06/02/2023	06/02/2023	methylphenidate 10 mg tablet	30	30	Perret, Damian ROMERO MD	[id number]	Insurance	[id number]  A	N	N	S	05/03/2023	05/06/2023	methylphenidate 10 mg tablet	30	30	Perret, Damian ROMERO MD	[id number]	Insurance	[id number] See hpi

## 2023-08-10 NOTE — ED ADULT NURSE NOTE - OBJECTIVE STATEMENT
pt presents to the ED c/o chest pain. Pt states he drank 2 cassie hard lemonades and half a smirnoff at 10PM last night. Pt has hx of depression, bipolar disorder, anxiety, and a open heart surgery. Pt is endorsing SI/HI, denies visual and auditory hallucinations.  Pt reports being dizzy, and HA. Denies fevers, chills, palpitations, or SOB. Pt placed on 1:1. Pt states he has extensive family hx of mental disorders and addiction.

## 2023-08-12 ENCOUNTER — EMERGENCY (EMERGENCY)
Facility: HOSPITAL | Age: 23
LOS: 0 days | Discharge: HOME | End: 2023-08-12
Attending: EMERGENCY MEDICINE
Payer: COMMERCIAL

## 2023-08-12 VITALS
DIASTOLIC BLOOD PRESSURE: 84 MMHG | HEART RATE: 78 BPM | OXYGEN SATURATION: 98 % | HEIGHT: 65 IN | TEMPERATURE: 99 F | RESPIRATION RATE: 18 BRPM | SYSTOLIC BLOOD PRESSURE: 116 MMHG | WEIGHT: 153 LBS

## 2023-08-12 VITALS
OXYGEN SATURATION: 98 % | HEART RATE: 67 BPM | SYSTOLIC BLOOD PRESSURE: 98 MMHG | RESPIRATION RATE: 20 BRPM | DIASTOLIC BLOOD PRESSURE: 71 MMHG | TEMPERATURE: 98 F

## 2023-08-12 DIAGNOSIS — F90.9 ATTENTION-DEFICIT HYPERACTIVITY DISORDER, UNSPECIFIED TYPE: ICD-10-CM

## 2023-08-12 DIAGNOSIS — F32.A DEPRESSION, UNSPECIFIED: ICD-10-CM

## 2023-08-12 DIAGNOSIS — Z91.018 ALLERGY TO OTHER FOODS: ICD-10-CM

## 2023-08-12 DIAGNOSIS — F84.0 AUTISTIC DISORDER: ICD-10-CM

## 2023-08-12 DIAGNOSIS — F60.3 BORDERLINE PERSONALITY DISORDER: ICD-10-CM

## 2023-08-12 DIAGNOSIS — R10.2 PELVIC AND PERINEAL PAIN: ICD-10-CM

## 2023-08-12 DIAGNOSIS — F41.1 GENERALIZED ANXIETY DISORDER: ICD-10-CM

## 2023-08-12 DIAGNOSIS — Z79.84 LONG TERM (CURRENT) USE OF ORAL HYPOGLYCEMIC DRUGS: ICD-10-CM

## 2023-08-12 DIAGNOSIS — E11.9 TYPE 2 DIABETES MELLITUS WITHOUT COMPLICATIONS: ICD-10-CM

## 2023-08-12 DIAGNOSIS — R10.32 LEFT LOWER QUADRANT PAIN: ICD-10-CM

## 2023-08-12 DIAGNOSIS — E28.2 POLYCYSTIC OVARIAN SYNDROME: ICD-10-CM

## 2023-08-12 DIAGNOSIS — Z98.890 OTHER SPECIFIED POSTPROCEDURAL STATES: Chronic | ICD-10-CM

## 2023-08-12 LAB
ALBUMIN SERPL ELPH-MCNC: 3.4 G/DL — SIGNIFICANT CHANGE UP (ref 3.3–5)
ALP SERPL-CCNC: 67 U/L — SIGNIFICANT CHANGE UP (ref 40–120)
ALT FLD-CCNC: 16 U/L — SIGNIFICANT CHANGE UP (ref 12–78)
ANION GAP SERPL CALC-SCNC: 3 MMOL/L — LOW (ref 5–17)
APAP SERPL-MCNC: <2 UG/ML — LOW (ref 10–30)
AST SERPL-CCNC: 14 U/L — LOW (ref 15–37)
BASOPHILS # BLD AUTO: 0.01 K/UL — SIGNIFICANT CHANGE UP (ref 0–0.2)
BASOPHILS NFR BLD AUTO: 0.1 % — SIGNIFICANT CHANGE UP (ref 0–2)
BILIRUB SERPL-MCNC: 0.2 MG/DL — SIGNIFICANT CHANGE UP (ref 0.2–1.2)
BUN SERPL-MCNC: 14 MG/DL — SIGNIFICANT CHANGE UP (ref 7–23)
CALCIUM SERPL-MCNC: 9 MG/DL — SIGNIFICANT CHANGE UP (ref 8.5–10.1)
CHLORIDE SERPL-SCNC: 113 MMOL/L — HIGH (ref 96–108)
CO2 SERPL-SCNC: 26 MMOL/L — SIGNIFICANT CHANGE UP (ref 22–31)
CREAT SERPL-MCNC: 1.01 MG/DL — SIGNIFICANT CHANGE UP (ref 0.5–1.3)
EGFR: 80 ML/MIN/1.73M2 — SIGNIFICANT CHANGE UP
EOSINOPHIL # BLD AUTO: 0 K/UL — SIGNIFICANT CHANGE UP (ref 0–0.5)
EOSINOPHIL NFR BLD AUTO: 0 % — SIGNIFICANT CHANGE UP (ref 0–6)
ETHANOL SERPL-MCNC: <10 MG/DL — SIGNIFICANT CHANGE UP (ref 0–10)
GLUCOSE SERPL-MCNC: 94 MG/DL — SIGNIFICANT CHANGE UP (ref 70–99)
HCG SERPL-ACNC: <1 MIU/ML — SIGNIFICANT CHANGE UP
HCT VFR BLD CALC: 40.3 % — SIGNIFICANT CHANGE UP (ref 34.5–45)
HGB BLD-MCNC: 12.9 G/DL — SIGNIFICANT CHANGE UP (ref 11.5–15.5)
IMM GRANULOCYTES NFR BLD AUTO: 0.4 % — SIGNIFICANT CHANGE UP (ref 0–0.9)
LIDOCAIN IGE QN: 140 U/L — SIGNIFICANT CHANGE UP (ref 73–393)
LYMPHOCYTES # BLD AUTO: 1.85 K/UL — SIGNIFICANT CHANGE UP (ref 1–3.3)
LYMPHOCYTES # BLD AUTO: 20.7 % — SIGNIFICANT CHANGE UP (ref 13–44)
MCHC RBC-ENTMCNC: 27.3 PG — SIGNIFICANT CHANGE UP (ref 27–34)
MCHC RBC-ENTMCNC: 32 GM/DL — SIGNIFICANT CHANGE UP (ref 32–36)
MCV RBC AUTO: 85.4 FL — SIGNIFICANT CHANGE UP (ref 80–100)
MONOCYTES # BLD AUTO: 0.36 K/UL — SIGNIFICANT CHANGE UP (ref 0–0.9)
MONOCYTES NFR BLD AUTO: 4 % — SIGNIFICANT CHANGE UP (ref 2–14)
NEUTROPHILS # BLD AUTO: 6.66 K/UL — SIGNIFICANT CHANGE UP (ref 1.8–7.4)
NEUTROPHILS NFR BLD AUTO: 74.8 % — SIGNIFICANT CHANGE UP (ref 43–77)
PLATELET # BLD AUTO: 306 K/UL — SIGNIFICANT CHANGE UP (ref 150–400)
POTASSIUM SERPL-MCNC: 3.9 MMOL/L — SIGNIFICANT CHANGE UP (ref 3.5–5.3)
POTASSIUM SERPL-SCNC: 3.9 MMOL/L — SIGNIFICANT CHANGE UP (ref 3.5–5.3)
PROT SERPL-MCNC: 6.8 GM/DL — SIGNIFICANT CHANGE UP (ref 6–8.3)
RBC # BLD: 4.72 M/UL — SIGNIFICANT CHANGE UP (ref 3.8–5.2)
RBC # FLD: 14 % — SIGNIFICANT CHANGE UP (ref 10.3–14.5)
SALICYLATES SERPL-MCNC: <1.7 MG/DL — LOW (ref 2.8–20)
SODIUM SERPL-SCNC: 142 MMOL/L — SIGNIFICANT CHANGE UP (ref 135–145)
WBC # BLD: 8.92 K/UL — SIGNIFICANT CHANGE UP (ref 3.8–10.5)
WBC # FLD AUTO: 8.92 K/UL — SIGNIFICANT CHANGE UP (ref 3.8–10.5)

## 2023-08-12 PROCEDURE — 80053 COMPREHEN METABOLIC PANEL: CPT

## 2023-08-12 PROCEDURE — 99284 EMERGENCY DEPT VISIT MOD MDM: CPT

## 2023-08-12 PROCEDURE — 99285 EMERGENCY DEPT VISIT HI MDM: CPT | Mod: 25

## 2023-08-12 PROCEDURE — 84702 CHORIONIC GONADOTROPIN TEST: CPT

## 2023-08-12 PROCEDURE — 76830 TRANSVAGINAL US NON-OB: CPT

## 2023-08-12 PROCEDURE — 83690 ASSAY OF LIPASE: CPT

## 2023-08-12 PROCEDURE — 80307 DRUG TEST PRSMV CHEM ANLYZR: CPT

## 2023-08-12 PROCEDURE — 36415 COLL VENOUS BLD VENIPUNCTURE: CPT

## 2023-08-12 PROCEDURE — 85025 COMPLETE CBC W/AUTO DIFF WBC: CPT

## 2023-08-12 PROCEDURE — 76830 TRANSVAGINAL US NON-OB: CPT | Mod: 26

## 2023-08-12 RX ORDER — FAMOTIDINE 10 MG/ML
20 INJECTION INTRAVENOUS ONCE
Refills: 0 | Status: COMPLETED | OUTPATIENT
Start: 2023-08-12 | End: 2023-08-12

## 2023-08-12 RX ORDER — SODIUM CHLORIDE 9 MG/ML
1000 INJECTION INTRAMUSCULAR; INTRAVENOUS; SUBCUTANEOUS ONCE
Refills: 0 | Status: COMPLETED | OUTPATIENT
Start: 2023-08-12 | End: 2023-08-12

## 2023-08-12 RX ORDER — KETOROLAC TROMETHAMINE 30 MG/ML
30 SYRINGE (ML) INJECTION ONCE
Refills: 0 | Status: DISCONTINUED | OUTPATIENT
Start: 2023-08-12 | End: 2023-08-12

## 2023-08-12 RX ADMIN — SODIUM CHLORIDE 1000 MILLILITER(S): 9 INJECTION INTRAMUSCULAR; INTRAVENOUS; SUBCUTANEOUS at 19:07

## 2023-08-12 NOTE — ED PROVIDER NOTE - GASTROINTESTINAL, MLM
Abdomen soft, mild diffuse lower abdominal tenderness especially inferior LLQ no guarding, rebound, or mass. Bowel sounds +, normal pitch. Abdomen soft, mild diffuse lower abdominal tenderness especially inferior LLQ no guarding, rebound, nor mass. Bowel sounds +, normal pitch.

## 2023-08-12 NOTE — ED ADULT NURSE NOTE - CHIEF COMPLAINT QUOTE
Pt presents to ED from group home. Pt states "I wanted to see my dad who lives in [place] but they won't let me. I also have abdominal pain that isn't going away." As per [employer] pt attempted to run away from staff and police officers into traffic in an attempt to go see his dad. Pt denies SI, HI. Pt calm and cooperative.

## 2023-08-12 NOTE — ED ADULT NURSE NOTE - NSFALLUNIVINTERV_ED_ALL_ED
Bed/Stretcher in lowest position, wheels locked, appropriate side rails in place/Call bell, personal items and telephone in reach/Instruct patient to call for assistance before getting out of bed/chair/stretcher/Non-slip footwear applied when patient is off stretcher/[place] to call system/Physically safe environment - no spills, clutter or unnecessary equipment/Purposeful proactive rounding/Room/bathroom lighting operational, light cord in reach

## 2023-08-12 NOTE — ED PROVIDER NOTE - OBJECTIVE STATEMENT
22 y/o female (identifies with He/His, named Dahiana) with PMHx of DM, PCOS with irregular periods, ADHD, autism, blood clots, JEREMY, borderline personality disorder, and depression presents to the ED with [employer] from group home. Pt states has been having "blackout" episodes recently where she cannot remember any details prior to, during, or after an event and was told by her PCP that they may be seizure like events. Pt only recalls she was outside of the group home and got far away, knows she was headed towards parent's house. [employer] states pt was running away from officers and staff into traffic. Pt denies any SI or HI. Pt states she had some sharp pelvic pain and rib pain while running away today, at the ED still has some pain but it has improved. Has had intermittent nausea and decreased appetite for the last week, denies vomiting. States she was last sexually active on Monday with + penetration. LMP 06/2024. Denies vaginal discharge, dysuria, or hematuria. 22 y/o female (identifies with He/His, named Dahiana) with PMHx of DM, PCOS with irregular periods, ADHD, autism, blood clots, JEREMY, borderline personality disorder, and depression presents to the ED with [employer] from group home. Pt states has been having "blackout" episodes recently where she cannot remember any details prior to, during, or after an event and was told by her PCP that they may be seizure like events. Pt only recalls she was outside of the group home and got far away, knows she was headed towards parent's house. [employer] states pt was running away from officers and staff into traffic after she was told she could not leave the group home. Pt denies any SI or HI. Pt states she had some sharp pelvic pain and rib pain prior to eloping which is similar to but more severe than the pain she has had in the past due to PCOS, at the ED still has some pain but it has improved. Has had intermittent nausea and decreased appetite for the last week, denies vomiting. States she was last sexually active on Monday with + penetration. LMP 06/2024. Denies vaginal discharge, dysuria, or hematuria. 22 y/o WF (identifies with He/His, named Dahiana) with PMHx of DM, PCOS with irregular periods, ADHD, autism, blood clots, JEREMY, borderline personality disorder, and depression presents to the ED via [employer] from group home. Pt states has been having "blackout" episodes recently where she cannot remember any details prior to, during, or after an event and was told by her PCP that they may be seizure-like events, pt is in process of outpt w/u. Pt only recalls she was outside of the group home and got far away walking, knows she was walking towards her parent's house. [employer] states pt was running away from officers and staff into traffic after she was told she could not leave the group home. Pt denies any SI or HI. Pt states she had some sharp pelvic pain and rib pain prior to eloping which is similar to but more severe than the pain she has had in the past due to her known PCOS, at the ED still has some mild pain but it has improved. Has had intermittent nausea and decreased appetite for the last week, denies vomiting. States she was last sexually active on Monday with + vaginal penetration (+ consensual). LMP 06/2024. Denies vaginal discharge, dysuria, or hematuria.

## 2023-08-12 NOTE — ED ADULT NURSE REASSESSMENT NOTE - NS ED NURSE REASSESS COMMENT FT1
call made to  (group home0 for transport for pt. Transport staff arrived at 2300. pt a&O. no distress noted.

## 2023-08-12 NOTE — ED PROVIDER NOTE - PATIENT PORTAL LINK FT
You can access the FollowMyHealth Patient Portal offered by [facility] by registering at the following website: [email or web address]. By joining [facility]’s FollowMyHealth portal, you will also be able to view your health information using other applications (apps) compatible with our system.

## 2023-08-12 NOTE — ED ADULT TRIAGE NOTE - CHIEF COMPLAINT QUOTE
Pt presents to ED from group home. Pt states "I wanted to see my dad who lives in [place] but they won't let me. I also have abdominal pain that isn't going away." As per [employer] pt attempted to run away from staff and police officers into traffic in an attempt to go see his dad. Pt denies SI, HI. Pt calm and cooperative.
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
## 2023-08-12 NOTE — ED PROVIDER NOTE - PSYCHIATRIC, MLM
Alert and oriented to person, place, time/situation. normal mood and affect. no apparent risk to self or others.  No active SI/HI.  + Cooperative.

## 2023-08-12 NOTE — ED ADULT NURSE NOTE - OBJECTIVE STATEMENT
patient brought in by [employer] and EMS after episode outside of group home.  as per EMS,  patient was outside, walked away from group home.  when police arrived, patient ran away and was in the street.  patient reports recent episodes of lapses in memory, does not completely recall event.  denies SI/HI.  no thoughts of self harm at this time.  patient endorsing lower abdominal discomfort, waxing and waning.  + nausea.  denies diarrhea, vomiting.  LMP June.  reports last sexual intercourse monday.

## 2023-08-12 NOTE — ED PROVIDER NOTE - MUSCULOSKELETAL, MLM
MAEx4. No focal swelling deformity tenderness. MAEx4. No focal extremity swelling, deformity nor tenderness.

## 2023-08-12 NOTE — ED PROVIDER NOTE - NSICDXPASTMEDICALHX_GEN_ALL_CORE_FT
PAST MEDICAL HISTORY:  Autism     Borderline personality disorder     Depression     Diabetes     JEREMY (generalized anxiety disorder)     H/O blood clots       PCOS (polycystic ovarian syndrome)

## 2023-09-08 NOTE — ED BEHAVIORAL HEALTH ASSESSMENT NOTE - NS ED BHA ED COURSE FOUR POINT RESTRAINTS IN ED YN
Spoke with patient's daughter, Tamika  (permission to speak to Tamika documented in Epic). She states that jennifer will not get her medications sent in by Dr. Merrill until next Tuesday. She is asking if a small, 2 week supply of the medications could be sent in to [facility]'s pharmacy until her medications arrive?    Short supply of medications sent to preferred pharmacy. Patient's daughter verbalized understanding.           No

## 2023-09-28 NOTE — ED ADULT NURSE NOTE - NSFALLLASTSIX_ED_ALL_ED
OPAT order received. CM alerted [employer] liaison. CM following. 1415:  [employer] liaison has schedule an initial appointment with pt for tomorrow between 10-11am for home infusion training. CM met with pt/family member to discuss dc services and home health preference. CM explained the role of both home health and home infusion services and the scope of services for each. Pt expressed no preference of [address] [place] [address] agency. Referral for [address] [place], [place] [address] RN and PT submitted to [facility]. CM spoke with [facility] and they plan to deliver the home wound vac tomorrow if that remains the dc plan. CM following. No.

## 2023-11-15 NOTE — ED ADULT TRIAGE NOTE - INTERNATIONAL TRAVEL
Called patientshe want to go for her ua & ucx to [place].   Changed orders from [facility] to [facility]
Patient indicates she does not want her orders for labs to be completed at home. Patient requesting to have them done at the [facility] lab,please call at [phone].   *informed about the 24-48 hour turnaround
No
28-Apr-2018 00:42

## 2023-11-15 NOTE — ED BEHAVIORAL HEALTH PROGRESS NOTE - NSBHMSEKNOWHOW_PSY_ALL_CORE
Lorna Tim  : 1956  Primary: Medicare Part A And B (Medicare)  Secondary: [address] @ [address] [facility]  [address]  [address],  [address] [place] [address]  Phone: [phone]  Fax: [phone] Plan Frequency: 1 time per week for up to 90 days    Plan of Care/Certification Expiration Date: 24      >PT Visit Info:  Plan Frequency: 1 time per week for up to 90 days  Plan of Care/Certification Expiration Date: 24      Visit Count:  3    OUTPATIENT PHYSICAL THERAPY: Treatment Note 11/15/2023       Episode  }Appt Desk             Treatment Diagnosis:    No data found\  Abnormal posture  Unsteadiness on feet  Dizziness and giddiness  Medical/Referring Diagnosis:      Referring Physician:  MATTI Gutiérrez CNP, MD Orders:  PT Eval and Treat   Date of Onset:  Onset Date: 10/27/23     Allergies:   Levofloxacin, Lexapro [escitalopram], Mycophenolate mofetil, and Ciprofloxacin  Restrictions/Precautions:  Restrictions/Precautions: None  No data recorded   Interventions Planned (Treatment may consist of any combination of the following):    Current Treatment Recommendations: Balance training; Functional mobility training; IADL training; Gait training; Neuromuscular re-education; Home exercise program; Vestibular rehab     >Subjective Comments: patient reports she is doing well but still getting her dizziness but still gets some episodes often related to anxious situations. >Initial:     0/10>Post Session:       0/10  Medications Last Reviewed:  11/15/2023  Updated Objective Findings:  None Today  Treatment   THERAPEUTIC EXERCISE: (40 minutes):    Exercises per grid below to improve mobility, balance, and coordination. Required moderate visual and verbal cues to promote proper body alignment and promote proper body posture. Progressed complexity of movement as indicated.      Date:  11/15/2023   Activity/Exercise Parameters   Smooth pursuit    saccades
Vocabulary

## 2024-01-22 ENCOUNTER — EMERGENCY (EMERGENCY)
Facility: HOSPITAL | Age: 24
LOS: 0 days | Discharge: HOME | End: 2024-01-22
Attending: EMERGENCY MEDICINE
Payer: COMMERCIAL

## 2024-01-22 VITALS
RESPIRATION RATE: 18 BRPM | HEART RATE: 77 BPM | TEMPERATURE: 98 F | OXYGEN SATURATION: 97 % | SYSTOLIC BLOOD PRESSURE: 101 MMHG | DIASTOLIC BLOOD PRESSURE: 64 MMHG

## 2024-01-22 VITALS — HEIGHT: 65 IN | WEIGHT: 177.91 LBS

## 2024-01-22 DIAGNOSIS — F41.1 GENERALIZED ANXIETY DISORDER: ICD-10-CM

## 2024-01-22 DIAGNOSIS — O99.891 OTHER SPECIFIED DISEASES AND CONDITIONS COMPLICATING PREGNANCY: ICD-10-CM

## 2024-01-22 DIAGNOSIS — F90.9 ATTENTION-DEFICIT HYPERACTIVITY DISORDER, UNSPECIFIED TYPE: ICD-10-CM

## 2024-01-22 DIAGNOSIS — R45.851 SUICIDAL IDEATIONS: ICD-10-CM

## 2024-01-22 DIAGNOSIS — O99.282 ENDOCRINE, NUTRITIONAL AND METABOLIC DISEASES COMPLICATING PREGNANCY, SECOND TRIMESTER: ICD-10-CM

## 2024-01-22 DIAGNOSIS — Z86.718 PERSONAL HISTORY OF OTHER VENOUS THROMBOSIS AND EMBOLISM: ICD-10-CM

## 2024-01-22 DIAGNOSIS — Z20.822 CONTACT WITH AND (SUSPECTED) EXPOSURE TO COVID-19: ICD-10-CM

## 2024-01-22 DIAGNOSIS — Z98.890 OTHER SPECIFIED POSTPROCEDURAL STATES: Chronic | ICD-10-CM

## 2024-01-22 DIAGNOSIS — Z3A.17 17 WEEKS GESTATION OF PREGNANCY: ICD-10-CM

## 2024-01-22 DIAGNOSIS — F32.A DEPRESSION, UNSPECIFIED: ICD-10-CM

## 2024-01-22 DIAGNOSIS — E11.9 TYPE 2 DIABETES MELLITUS WITHOUT COMPLICATIONS: ICD-10-CM

## 2024-01-22 DIAGNOSIS — F60.3 BORDERLINE PERSONALITY DISORDER: ICD-10-CM

## 2024-01-22 DIAGNOSIS — F84.0 AUTISTIC DISORDER: ICD-10-CM

## 2024-01-22 DIAGNOSIS — E28.2 POLYCYSTIC OVARIAN SYNDROME: ICD-10-CM

## 2024-01-22 DIAGNOSIS — Z91.018 ALLERGY TO OTHER FOODS: ICD-10-CM

## 2024-01-22 LAB
ALBUMIN SERPL ELPH-MCNC: 2.5 G/DL — LOW (ref 3.3–5)
ALP SERPL-CCNC: 56 U/L — SIGNIFICANT CHANGE UP (ref 40–120)
ALT FLD-CCNC: 20 U/L — SIGNIFICANT CHANGE UP (ref 12–78)
AMPHET UR-MCNC: NEGATIVE — SIGNIFICANT CHANGE UP
ANION GAP SERPL CALC-SCNC: 7 MMOL/L — SIGNIFICANT CHANGE UP (ref 5–17)
APAP SERPL-MCNC: <2 UG/ML — LOW (ref 10–30)
AST SERPL-CCNC: 8 U/L — LOW (ref 15–37)
BARBITURATES UR SCN-MCNC: NEGATIVE — SIGNIFICANT CHANGE UP
BASOPHILS # BLD AUTO: 0.01 K/UL — SIGNIFICANT CHANGE UP (ref 0–0.2)
BASOPHILS NFR BLD AUTO: 0.1 % — SIGNIFICANT CHANGE UP (ref 0–2)
BENZODIAZ UR-MCNC: NEGATIVE — SIGNIFICANT CHANGE UP
BILIRUB SERPL-MCNC: 0.1 MG/DL — LOW (ref 0.2–1.2)
BLD GP AB SCN SERPL QL: SIGNIFICANT CHANGE UP
BUN SERPL-MCNC: 18 MG/DL — SIGNIFICANT CHANGE UP (ref 7–23)
CALCIUM SERPL-MCNC: 9 MG/DL — SIGNIFICANT CHANGE UP (ref 8.5–10.1)
CHLORIDE SERPL-SCNC: 110 MMOL/L — HIGH (ref 96–108)
CO2 SERPL-SCNC: 20 MMOL/L — LOW (ref 22–31)
COCAINE METAB.OTHER UR-MCNC: NEGATIVE — SIGNIFICANT CHANGE UP
CREAT SERPL-MCNC: 0.66 MG/DL — SIGNIFICANT CHANGE UP (ref 0.5–1.3)
EGFR: 126 ML/MIN/1.73M2 — SIGNIFICANT CHANGE UP
EOSINOPHIL # BLD AUTO: 0.01 K/UL — SIGNIFICANT CHANGE UP (ref 0–0.5)
EOSINOPHIL NFR BLD AUTO: 0.1 % — SIGNIFICANT CHANGE UP (ref 0–6)
ETHANOL SERPL-MCNC: <10 MG/DL — SIGNIFICANT CHANGE UP (ref 0–10)
FLUAV AG NPH QL: SIGNIFICANT CHANGE UP
FLUBV AG NPH QL: SIGNIFICANT CHANGE UP
GLUCOSE SERPL-MCNC: 87 MG/DL — SIGNIFICANT CHANGE UP (ref 70–99)
HCG SERPL-ACNC: HIGH MIU/ML
HCT VFR BLD CALC: 32.1 % — LOW (ref 34.5–45)
HGB BLD-MCNC: 10.9 G/DL — LOW (ref 11.5–15.5)
IMM GRANULOCYTES NFR BLD AUTO: 0.5 % — SIGNIFICANT CHANGE UP (ref 0–0.9)
LYMPHOCYTES # BLD AUTO: 18.6 % — SIGNIFICANT CHANGE UP (ref 13–44)
LYMPHOCYTES # BLD AUTO: 2.05 K/UL — SIGNIFICANT CHANGE UP (ref 1–3.3)
MCHC RBC-ENTMCNC: 27.3 PG — SIGNIFICANT CHANGE UP (ref 27–34)
MCHC RBC-ENTMCNC: 34 GM/DL — SIGNIFICANT CHANGE UP (ref 32–36)
MCV RBC AUTO: 80.5 FL — SIGNIFICANT CHANGE UP (ref 80–100)
METHADONE UR-MCNC: NEGATIVE — SIGNIFICANT CHANGE UP
MONOCYTES # BLD AUTO: 0.53 K/UL — SIGNIFICANT CHANGE UP (ref 0–0.9)
MONOCYTES NFR BLD AUTO: 4.8 % — SIGNIFICANT CHANGE UP (ref 2–14)
NEUTROPHILS # BLD AUTO: 8.36 K/UL — HIGH (ref 1.8–7.4)
NEUTROPHILS NFR BLD AUTO: 75.9 % — SIGNIFICANT CHANGE UP (ref 43–77)
OPIATES UR-MCNC: NEGATIVE — SIGNIFICANT CHANGE UP
PCP SPEC-MCNC: SIGNIFICANT CHANGE UP
PCP UR-MCNC: NEGATIVE — SIGNIFICANT CHANGE UP
PLATELET # BLD AUTO: 222 K/UL — SIGNIFICANT CHANGE UP (ref 150–400)
POTASSIUM SERPL-MCNC: 3.5 MMOL/L — SIGNIFICANT CHANGE UP (ref 3.5–5.3)
POTASSIUM SERPL-SCNC: 3.5 MMOL/L — SIGNIFICANT CHANGE UP (ref 3.5–5.3)
PROT SERPL-MCNC: 6.1 GM/DL — SIGNIFICANT CHANGE UP (ref 6–8.3)
RBC # BLD: 3.99 M/UL — SIGNIFICANT CHANGE UP (ref 3.8–5.2)
RBC # FLD: 14 % — SIGNIFICANT CHANGE UP (ref 10.3–14.5)
RSV RNA NPH QL NAA+NON-PROBE: SIGNIFICANT CHANGE UP
SALICYLATES SERPL-MCNC: <1.7 MG/DL — LOW (ref 2.8–20)
SARS-COV-2 RNA SPEC QL NAA+PROBE: SIGNIFICANT CHANGE UP
SODIUM SERPL-SCNC: 137 MMOL/L — SIGNIFICANT CHANGE UP (ref 135–145)
THC UR QL: NEGATIVE — SIGNIFICANT CHANGE UP
WBC # BLD: 11.02 K/UL — HIGH (ref 3.8–10.5)
WBC # FLD AUTO: 11.02 K/UL — HIGH (ref 3.8–10.5)

## 2024-01-22 PROCEDURE — 84702 CHORIONIC GONADOTROPIN TEST: CPT

## 2024-01-22 PROCEDURE — 86901 BLOOD TYPING SEROLOGIC RH(D): CPT

## 2024-01-22 PROCEDURE — 86900 BLOOD TYPING SEROLOGIC ABO: CPT

## 2024-01-22 PROCEDURE — 80053 COMPREHEN METABOLIC PANEL: CPT

## 2024-01-22 PROCEDURE — 80307 DRUG TEST PRSMV CHEM ANLYZR: CPT

## 2024-01-22 PROCEDURE — 93005 ELECTROCARDIOGRAM TRACING: CPT

## 2024-01-22 PROCEDURE — 90792 PSYCH DIAG EVAL W/MED SRVCS: CPT

## 2024-01-22 PROCEDURE — 93010 ELECTROCARDIOGRAM REPORT: CPT

## 2024-01-22 PROCEDURE — 36415 COLL VENOUS BLD VENIPUNCTURE: CPT

## 2024-01-22 PROCEDURE — 85025 COMPLETE CBC W/AUTO DIFF WBC: CPT

## 2024-01-22 PROCEDURE — 99284 EMERGENCY DEPT VISIT MOD MDM: CPT

## 2024-01-22 PROCEDURE — 99285 EMERGENCY DEPT VISIT HI MDM: CPT

## 2024-01-22 PROCEDURE — 86850 RBC ANTIBODY SCREEN: CPT

## 2024-01-22 PROCEDURE — 0241U: CPT

## 2024-01-22 NOTE — ED ADULT NURSE NOTE - NSFALLHARMRISKINTERV_ED_ALL_ED

## 2024-01-22 NOTE — ED ADULT NURSE NOTE - OBJECTIVE STATEMENT
pt is A/Ox4 from home and is ambulatory. pt to the ED with c/o of Suicidal thoughts w/o a current plan. pt states she has had recent increased stress due to her pregnancy, pt 17 weeks pregnant currently. pt states she has had pervious admissions for SI in the past. pt denies HI or psychotic symptoms. pt respirations even and unlabored. pt in NAD.

## 2024-01-22 NOTE — ED PROVIDER NOTE - CLINICAL SUMMARY MEDICAL DECISION MAKING FREE TEXT BOX
Pt here for psych evaluation. No medical complaints at this time.  Conversant. Cooperative. Alert, oriented. Nonfocal neuro exam. Ambulatory. No signs of acute infection, trauma, intoxication, toxidrome. Will check psych screening labs. Dispo per psychiatry team.     FHR: 168 on bedside sono

## 2024-01-22 NOTE — ED PROVIDER NOTE - PROGRESS NOTE DETAILS
Ignacio: patient received ins ign out from Dr. Donovan pending psych eval. psych saw and cleared patient for dc. Patient is [id number] clear for dc. Ambulating independently in ED. Tolerating PO. Stable for dc. Patient understands return precautions and f/u.
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
## 2024-01-22 NOTE — ED ADULT NURSE REASSESSMENT NOTE - NS ED NURSE REASSESS COMMENT FT1
report taken from night RN at bedside. no distress noted. pt updated on plan of care 1:1 sheet signed

## 2024-01-22 NOTE — ED PROVIDER NOTE - OBJECTIVE STATEMENT
24yo f 17wks pregnant pmh PMHx of DM, PCOS with irregular periods, ADHD, autism, blood clots, JEREMY, borderline personality disorder, and depression presents to the ED 22yo f 17wks pregnant pmh PMHx of DM, PCOS with irregular periods, ADHD, autism, blood clots, JEREMY, borderline personality disorder, and depression presents to the ED With concern for increased SI.  Patient states she was previously on psych meds prior to pregnancy.  No specific plan.  Of note patient states she was previously trans female to male however presently uses pronouns she and them.

## 2024-01-22 NOTE — ED ADULT TRIAGE NOTE - CHIEF COMPLAINT QUOTE
Pt presents to [facility] complaining of suicidal ideation. Pt states "I don't have a plan I have thoughts". Pt is currently 17 week pregnant . Pt denies HI. Pt calm and cooperative in triage. Pt preffered name is Graciela.
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
## 2024-01-22 NOTE — ED PROVIDER NOTE - PHYSICAL EXAMINATION
GEN - NAD; well appearing; A+O x3  HEAD - NC/AT    EYES - EOMI, no conjunctival pallor, no scleral icterus  ENT -   mucous membranes  moist , no discharge  PULM - CTA b/l,  symmetric breath sounds  COR -  RRR, S1 S2, no murmurs  ABD - ND, NT, soft, no guarding, no rebound, no masses    EXTREMS -no edema, no deformity, warm and well perfused   SKIN - no rash or bruising      NEUROLOGIC - alert, sensation nl, motor 5/5 RUE/LUE/RLE/LLE

## 2024-01-22 NOTE — ED BEHAVIORAL HEALTH ASSESSMENT NOTE - RISK ASSESSMENT
Chronic rf: prior psych hx/admissions, prior interrupted suicide attempts  Modifiable rf: stress tied to living situation, inability to safety plan, SI  PF: Support from family/friends, connected to care, no e/o psychosis or arturo
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
## 2024-01-22 NOTE — ED ADULT NURSE NOTE - CHIEF COMPLAINT QUOTE
Pt presents to [facility] complaining of suicidal ideation. Pt states "I don't have a plan I have thoughts". Pt is currently 17 week pregnant . Pt denies HI. Pt calm and cooperative in triage. Pt preffered name is Graciela.

## 2024-01-22 NOTE — ED BEHAVIORAL HEALTH ASSESSMENT NOTE - DESCRIPTION
Vital Signs Last 24 Hrs  T(C): 36.7 (22 Jan 2024 12:07), Max: 36.8 (22 Jan 2024 03:29)  T(F): 98 (22 Jan 2024 12:07), Max: 98.2 (22 Jan 2024 03:29)  HR: 77 (22 Jan 2024 12:07) (70 - 95)  BP: 101/64 (22 Jan 2024 12:07) (101/64 - 117/67)  BP(mean): 74 (22 Jan 2024 12:07) (72 - 78)  RR: 18 (22 Jan 2024 12:07) (17 - 18)  SpO2: 97% (22 Jan 2024 12:07) (97% - 100%)    Parameters below as of 22 Jan 2024 12:07  Patient On (Oxygen Delivery Method): room air See hpi

## 2024-01-22 NOTE — ED BEHAVIORAL HEALTH ASSESSMENT NOTE - NSSUICPROTFACTOTHER_PSY_ALL_CORE
58 [facility] OR  [email or web address]  AT NIGHT AND ON WEEKENDS:  Contact on-call GI fellow via answering service ([phone]) from 5pm-8am and on weekends/holidays  MONDAY-FRIDAY 8AM-5PM:  Pager# [phone] ([facility]) or [phone] ([facility])  GI Phone# [phone] ([facility])   58 [facility] OR  [email or web address] ([facility]) or [phone] ([facility])  GI Phone# [phone] ([facility])   Engaged in pottery class

## 2024-01-22 NOTE — ED BEHAVIORAL HEALTH ASSESSMENT NOTE - NSSUICPROTFACT_PSY_ALL_CORE
Responsibility to children, family, or others/Identifies reasons for living/Supportive social network of family or friends/Fear of death or the actual act of killing self/Positive therapeutic relationships/Other

## 2024-01-27 NOTE — ED BEHAVIORAL HEALTH PROGRESS NOTE - NSBHMSEINTELL_PSY_A_CORE
Physical Therapy    Patient not seen in therapy.     Unavailable due to sleeping soundly/unarousable.      Re-attempt plan: per established plan of care    Attempted to see patient for treatment. Patient sleeping soundly. Patient in wrist restraints. Per nurse, patient did not sleep last night. Was agitated. Also refused his morning meds.       OBJECTIVE                         Therapy procedure time and total treatment time can be found documented on the Time Entry flowsheet   Unable to assess

## 2024-02-08 NOTE — ED BEHAVIORAL HEALTH ASSESSMENT NOTE - NSCURPASTPSYDX_PSY_ALL_CORE
Gastroenterology Outpatient History and Physical     Patient: Mark Braun MRN: [id number] Sex: male   YOB: 1949 Age: 74 y.o. Location: [facility]    Date:2/8/2024  Primary Care Physician: Michael Hussein MD         Patient: Mark Braun    Physician: Jalen Myers MD    History of Present Illness: rectal bleeding  Review of Systems:  Weight Loss: No  Dysphagia: No  Dyspepsia: No  History:  Past Medical History:   Diagnosis Date    Arthritis     Gout     Hx of renal cell cancer     Kidney stones       Past Surgical History:   Procedure Laterality Date    COLONOSCOPY N/A 11/30/2022    COLONOSCOPY POLYPECTOMY SNARE/COLD BIOPSY performed by Jalen Myers MD at [facility] ENDOSCOPY    HERNIA REPAIR  06/2023    JOINT REPLACEMENT Bilateral     knee replacement    KNEE ARTHROSCOPY  01/01/1986    SALIVARY GLAND SURGERY      removed    SHOULDER SURGERY Right     TOTAL KNEE ARTHROPLASTY  01/01/2006      Social History     Socioeconomic History    Marital status:      Spouse name: None    Number of children: None    Years of education: None    Highest education level: None   Occupational History    Occupation: ret   Tobacco Use    Smoking status: Never    Smokeless tobacco: Never   Vaping Use    Vaping Use: Never used   Substance and Sexual Activity    Alcohol use: No    Drug use: No      Family History   Problem Relation Age of Onset    Heart Failure Father         heart disease    Cancer Brother     Hypertension Brother     Hypertension Brother     Stroke Brother      Allergies: No Known Allergies  Medications:   Prior to Admission medications    Medication Sig Start Date End Date Taking? Authorizing Provider   sildenafil (REVATIO) 20 MG tablet TAKE 1 TO 5 TABLETS BY MOUTH ONCE DAILY AS NEEDED  Patient not taking: Reported on 2/8/2024 5/15/21   ProviderRaymon MD   Cholecalciferol (VITAMIN D) 50 MCG (2000 UT) CAPS capsule Take by mouth    ProviderRaymon MD 
Mood disorder/ADHD/PTSD/Cluster B Personality disorder/traits

## 2024-02-12 NOTE — ED BEHAVIORAL HEALTH ASSESSMENT NOTE - CURRENT MEDICATION
Pt referral has been placed. Wife of pt has been notified and given information    eliquis 5mg BID, Lithium 750 mg po QHS, metformin 1000mg BID, Concerta ER 36 mg daily and 10 mg at 1pm

## 2024-06-05 NOTE — PROGRESS NOTE BEHAVIORAL HEALTH - NSBHLOC_PSY_A_CORE
Left detailed message on mothers personal voicemail of below information. Mother to call to confirm she did receive this message and to confirm her appointment for 9- at 1 pm. Michelle WILLIAMSON with Dr Adeola ADRIAN  
Alert
PAST MEDICAL HISTORY:  Hypertension     Prediabetes

## 2024-08-22 ENCOUNTER — INPATIENT (INPATIENT)
Facility: HOSPITAL | Age: 24
LOS: 6 days | Discharge: HOME | DRG: 918 | End: 2024-08-29
Attending: PSYCHIATRY & NEUROLOGY | Admitting: PSYCHIATRY & NEUROLOGY
Payer: COMMERCIAL

## 2024-08-22 VITALS — RESPIRATION RATE: 18 BRPM

## 2024-08-22 DIAGNOSIS — T43.291A POISONING BY OTHER ANTIDEPRESSANTS, ACCIDENTAL (UNINTENTIONAL), INITIAL ENCOUNTER: ICD-10-CM

## 2024-08-22 DIAGNOSIS — F60.3 BORDERLINE PERSONALITY DISORDER: ICD-10-CM

## 2024-08-22 DIAGNOSIS — T43.292A POISONING BY OTHER ANTIDEPRESSANTS, INTENTIONAL SELF-HARM, INITIAL ENCOUNTER: ICD-10-CM

## 2024-08-22 DIAGNOSIS — Z98.890 OTHER SPECIFIED POSTPROCEDURAL STATES: Chronic | ICD-10-CM

## 2024-08-22 DIAGNOSIS — F33.2 MAJOR DEPRESSIVE DISORDER, RECURRENT SEVERE WITHOUT PSYCHOTIC FEATURES: ICD-10-CM

## 2024-08-22 LAB
ADD ON TEST-SPECIMEN IN LAB: SIGNIFICANT CHANGE UP
ALBUMIN SERPL ELPH-MCNC: 3.5 G/DL — SIGNIFICANT CHANGE UP (ref 3.3–5)
ALP SERPL-CCNC: 85 U/L — SIGNIFICANT CHANGE UP (ref 40–120)
ALT FLD-CCNC: 31 U/L — SIGNIFICANT CHANGE UP (ref 12–78)
AMPHET UR-MCNC: NEGATIVE — SIGNIFICANT CHANGE UP
ANION GAP SERPL CALC-SCNC: 9 MMOL/L — SIGNIFICANT CHANGE UP (ref 5–17)
APAP SERPL-MCNC: <2 UG/ML — LOW (ref 10–30)
APPEARANCE UR: CLEAR — SIGNIFICANT CHANGE UP
AST SERPL-CCNC: 15 U/L — SIGNIFICANT CHANGE UP (ref 15–37)
BACTERIA # UR AUTO: ABNORMAL /HPF
BARBITURATES UR SCN-MCNC: NEGATIVE — SIGNIFICANT CHANGE UP
BASOPHILS # BLD AUTO: 0.05 K/UL — SIGNIFICANT CHANGE UP (ref 0–0.2)
BASOPHILS NFR BLD AUTO: 0.4 % — SIGNIFICANT CHANGE UP (ref 0–2)
BENZODIAZ UR-MCNC: NEGATIVE — SIGNIFICANT CHANGE UP
BILIRUB SERPL-MCNC: 0.2 MG/DL — SIGNIFICANT CHANGE UP (ref 0.2–1.2)
BILIRUB UR-MCNC: NEGATIVE — SIGNIFICANT CHANGE UP
BUN SERPL-MCNC: 15 MG/DL — SIGNIFICANT CHANGE UP (ref 7–23)
CALCIUM SERPL-MCNC: 9.3 MG/DL — SIGNIFICANT CHANGE UP (ref 8.5–10.1)
CAST: 1 /LPF — SIGNIFICANT CHANGE UP (ref 0–4)
CHLORIDE SERPL-SCNC: 106 MMOL/L — SIGNIFICANT CHANGE UP (ref 96–108)
CO2 SERPL-SCNC: 26 MMOL/L — SIGNIFICANT CHANGE UP (ref 22–31)
COCAINE METAB.OTHER UR-MCNC: NEGATIVE — SIGNIFICANT CHANGE UP
COLOR SPEC: YELLOW — SIGNIFICANT CHANGE UP
CREAT SERPL-MCNC: 0.96 MG/DL — SIGNIFICANT CHANGE UP (ref 0.5–1.3)
DIFF PNL FLD: NEGATIVE — SIGNIFICANT CHANGE UP
EGFR: 84 ML/MIN/1.73M2 — SIGNIFICANT CHANGE UP
EOSINOPHIL # BLD AUTO: 0.54 K/UL — HIGH (ref 0–0.5)
EOSINOPHIL NFR BLD AUTO: 4.5 % — SIGNIFICANT CHANGE UP (ref 0–6)
ETHANOL SERPL-MCNC: <10 MG/DL — SIGNIFICANT CHANGE UP (ref 0–10)
FENTANYL UR QL SCN: NEGATIVE — SIGNIFICANT CHANGE UP
FLUAV AG NPH QL: SIGNIFICANT CHANGE UP
FLUBV AG NPH QL: SIGNIFICANT CHANGE UP
GLUCOSE SERPL-MCNC: 89 MG/DL — SIGNIFICANT CHANGE UP (ref 70–99)
GLUCOSE UR QL: NEGATIVE MG/DL — SIGNIFICANT CHANGE UP
HCG SERPL-ACNC: <1 MIU/ML — SIGNIFICANT CHANGE UP
HCT VFR BLD CALC: 39.6 % — SIGNIFICANT CHANGE UP (ref 34.5–45)
HGB BLD-MCNC: 12.9 G/DL — SIGNIFICANT CHANGE UP (ref 11.5–15.5)
IMM GRANULOCYTES NFR BLD AUTO: 0.2 % — SIGNIFICANT CHANGE UP (ref 0–0.9)
KETONES UR-MCNC: NEGATIVE MG/DL — SIGNIFICANT CHANGE UP
LEUKOCYTE ESTERASE UR-ACNC: ABNORMAL
LYMPHOCYTES # BLD AUTO: 25.6 % — SIGNIFICANT CHANGE UP (ref 13–44)
LYMPHOCYTES # BLD AUTO: 3.08 K/UL — SIGNIFICANT CHANGE UP (ref 1–3.3)
MCHC RBC-ENTMCNC: 26.4 PG — LOW (ref 27–34)
MCHC RBC-ENTMCNC: 32.6 GM/DL — SIGNIFICANT CHANGE UP (ref 32–36)
MCV RBC AUTO: 81.1 FL — SIGNIFICANT CHANGE UP (ref 80–100)
METHADONE UR-MCNC: NEGATIVE — SIGNIFICANT CHANGE UP
MONOCYTES # BLD AUTO: 0.46 K/UL — SIGNIFICANT CHANGE UP (ref 0–0.9)
MONOCYTES NFR BLD AUTO: 3.8 % — SIGNIFICANT CHANGE UP (ref 2–14)
NEUTROPHILS # BLD AUTO: 7.86 K/UL — HIGH (ref 1.8–7.4)
NEUTROPHILS NFR BLD AUTO: 65.5 % — SIGNIFICANT CHANGE UP (ref 43–77)
NITRITE UR-MCNC: NEGATIVE — SIGNIFICANT CHANGE UP
OPIATES UR-MCNC: NEGATIVE — SIGNIFICANT CHANGE UP
PCP SPEC-MCNC: SIGNIFICANT CHANGE UP
PCP UR-MCNC: NEGATIVE — SIGNIFICANT CHANGE UP
PH UR: 5.5 — SIGNIFICANT CHANGE UP (ref 5–8)
PLATELET # BLD AUTO: 332 K/UL — SIGNIFICANT CHANGE UP (ref 150–400)
POTASSIUM SERPL-MCNC: 3.7 MMOL/L — SIGNIFICANT CHANGE UP (ref 3.5–5.3)
POTASSIUM SERPL-SCNC: 3.7 MMOL/L — SIGNIFICANT CHANGE UP (ref 3.5–5.3)
PROT SERPL-MCNC: 7.3 GM/DL — SIGNIFICANT CHANGE UP (ref 6–8.3)
PROT UR-MCNC: NEGATIVE MG/DL — SIGNIFICANT CHANGE UP
RBC # BLD: 4.88 M/UL — SIGNIFICANT CHANGE UP (ref 3.8–5.2)
RBC # FLD: 13.8 % — SIGNIFICANT CHANGE UP (ref 10.3–14.5)
RBC CASTS # UR COMP ASSIST: 1 /HPF — SIGNIFICANT CHANGE UP (ref 0–4)
RSV RNA NPH QL NAA+NON-PROBE: SIGNIFICANT CHANGE UP
SALICYLATES SERPL-MCNC: <1.7 MG/DL — LOW (ref 2.8–20)
SARS-COV-2 RNA SPEC QL NAA+PROBE: SIGNIFICANT CHANGE UP
SARS-COV-2 RNA SPEC QL NAA+PROBE: SIGNIFICANT CHANGE UP
SODIUM SERPL-SCNC: 141 MMOL/L — SIGNIFICANT CHANGE UP (ref 135–145)
SP GR SPEC: 1.02 — SIGNIFICANT CHANGE UP (ref 1–1.03)
SQUAMOUS # UR AUTO: 7 /HPF — HIGH (ref 0–5)
THC UR QL: NEGATIVE — SIGNIFICANT CHANGE UP
UROBILINOGEN FLD QL: 0.2 MG/DL — SIGNIFICANT CHANGE UP (ref 0.2–1)
WBC # BLD: 12.02 K/UL — HIGH (ref 3.8–10.5)
WBC # FLD AUTO: 12.02 K/UL — HIGH (ref 3.8–10.5)
WBC UR QL: 8 /HPF — HIGH (ref 0–5)

## 2024-08-22 PROCEDURE — 99253 IP/OBS CNSLTJ NEW/EST LOW 45: CPT

## 2024-08-22 PROCEDURE — 83036 HEMOGLOBIN GLYCOSYLATED A1C: CPT

## 2024-08-22 PROCEDURE — 80061 LIPID PANEL: CPT

## 2024-08-22 PROCEDURE — 84443 ASSAY THYROID STIM HORMONE: CPT

## 2024-08-22 PROCEDURE — 36415 COLL VENOUS BLD VENIPUNCTURE: CPT

## 2024-08-22 RX ORDER — HYDROXYZINE HCL 25 MG
50 TABLET ORAL EVERY 6 HOURS
Refills: 0 | Status: DISCONTINUED | OUTPATIENT
Start: 2024-08-22 | End: 2024-08-29

## 2024-08-22 RX ORDER — ACETAMINOPHEN 325 MG/1
650 TABLET ORAL EVERY 6 HOURS
Refills: 0 | Status: DISCONTINUED | OUTPATIENT
Start: 2024-08-22 | End: 2024-08-29

## 2024-08-22 RX ORDER — HALOPERIDOL 1 MG
5 TABLET ORAL EVERY 6 HOURS
Refills: 0 | Status: DISCONTINUED | OUTPATIENT
Start: 2024-08-22 | End: 2024-08-29

## 2024-08-22 RX ORDER — ALPRAZOLAM 0.25 MG
0.5 TABLET ORAL ONCE
Refills: 0 | Status: DISCONTINUED | OUTPATIENT
Start: 2024-08-22 | End: 2024-08-22

## 2024-08-22 RX ORDER — SODIUM CHLORIDE 9 MG/ML
1000 INJECTION INTRAMUSCULAR; INTRAVENOUS; SUBCUTANEOUS ONCE
Refills: 0 | Status: COMPLETED | OUTPATIENT
Start: 2024-08-22 | End: 2024-08-22

## 2024-08-22 RX ORDER — MAGNESIUM, ALUMINUM HYDROXIDE 200-225/5
30 SUSPENSION, ORAL (FINAL DOSE FORM) ORAL EVERY 6 HOURS
Refills: 0 | Status: DISCONTINUED | OUTPATIENT
Start: 2024-08-22 | End: 2024-08-29

## 2024-08-22 RX ADMIN — SODIUM CHLORIDE 1000 MILLILITER(S): 9 INJECTION INTRAMUSCULAR; INTRAVENOUS; SUBCUTANEOUS at 02:03

## 2024-08-22 RX ADMIN — Medication 50 MILLIGRAM(S): at 21:02

## 2024-08-22 NOTE — ED ADULT NURSE NOTE - OBJECTIVE STATEMENT
Pt presents to ED c/o medication overdose. Pt reports taking 4 Wellbutrin pills in the last 4 hrs. Pt endorses SI. Denies HI, auditory, visual, tactile disturbances. As per boyfriend, pt tried to take whole bottle of Wellbutrin last night. Boyfriend reports pt recently had stillbirth at 33 weeks. AxOx4, RR even and unlabored, 99% on RA. Boyfriend at bedside. PMHx of anxiety, depression, disassociation disorder.

## 2024-08-22 NOTE — CONSULT NOTE ADULT - PROBLEM SELECTOR RECOMMENDATION 9
Bupropion is an dopamine and norepinephrine reuptake inhibitor with QRS prolonging and serotonergic agonism effects. In overdose, patients could develop QRS prolongation and seizures which may be delayed up to 24 hours post-ingestion. QRS prolongation may not respond to sodium bicarbonate as mechanism is via cardiac gap junction interference. Patients can also develop serotonergic toxicity especially in conjunction with other serotonergic agonists (AMS, hypertension, tachycardia, hyperthermia, neuromuscular excitation such as clonus, opsoclonus tremors, hyperflexia).    #Recommendations  - Supportive care, please observe for agitation, hallucinations, tachycardia and seizures.  - Given extended release preparation and potential for delayed effects, recommend 12 hours of monitoring from time of ingestion.  - If asymptomatic with normal vitals signs and labs at end of observation period, cleared from acute toxicological standpoint  - Further medical and/or psychiatric care per primary team    Thank you for involving us in the care of this patient. Assessment and plan discussed with toxicology attending Dr. Ramakrishna Guaman. Please do not hesitate to reach out to the toxicology team for any further questions or concerns.    The On-Call Toxicology Fellow can be reached 24/7 via Pager #[phone]  Please send a 10 digit call back # as [place] cover multiple hospitals    Arron Aviles MD  Toxicology Fellow  PGY-4 Bupropion is an dopamine and norepinephrine reuptake inhibitor with QRS prolonging and serotonergic agonism effects. In overdose, patients could develop QRS prolongation and seizures which may be delayed up to 24 hours post-ingestion. QRS prolongation may not respond to sodium bicarbonate as mechanism is via cardiac gap junction interference. Patients can also develop serotonergic toxicity especially in conjunction with other serotonergic agonists (AMS, hypertension, tachycardia, hyperthermia, neuromuscular excitation such as clonus, opsoclonus tremors, hyperflexia).    #Recommendations  - Supportive care, please observe for agitation, hallucinations, tachycardia and seizures.  - Given extended release preparation and potential for delayed effects, recommend 12 hours of monitoring from time of ingestion.  - If asymptomatic with normal vitals signs and labs at end of observation period, cleared from acute toxicological standpoint  - Further medical and/or psychiatric care per primary team    Thank you for involving us in the care of this patient. Assessment and plan discussed with toxicology attending Dr. Ramakrishna Guaman. Please do not hesitate to reach out to the toxicology team for any further questions or concerns.    The On-Call Toxicology Fellow can be reached 24/7 via Pager #[phone]  Please send a 10 digit call back # as [place] cover multiple hospitals    Arron Aviles MD  Toxicology Fellow  PGY-4  Attending Rowena:  Agree with above, given no tachycardia, hallucinations, tremors, can obs in ed x 12 hrs. If baseline, normal vitals at 9 am w/ no other symptoms can be cleared from tox standpoint

## 2024-08-22 NOTE — ED PROVIDER NOTE - CLINICAL SUMMARY MEDICAL DECISION MAKING FREE TEXT BOX
24-year-old female presents after intentional overdose of her home medication Wellbutrin 150 mg XL x 4 tabs at about 9 PM.  Patient feeling depressed over the loss of her pregnancy which occurred in May.  Will obtain labs and medically evaluate and placed on one-to-one  observation as well as cardiac monitor.  Toxicology consulted recommending 12-hour monitoring.  And if she does not develop any symptoms  such as tachycardia or rigidity or palpitations  can be medically cleared which would be at 9am. Psych consult placed.

## 2024-08-22 NOTE — H&P ADULT - ASSESSMENT
24 year old female with past medical history of venous thromboembolism (not on anticoagulation, found to be hormonal), mitral valvulopathy and prediabetes and past psychiatric history of autism spectrum disorder, major depressive vs unspecified bipolar disorder, unspecified anxiety, ADHD, PTSD and borderline personality disorder who presented to the hospital for overdose on Wellbutrin.    Plan  1. Depression/SI  - Management per psychiatry     2. Pre-diabetes  - A1C in AM     3. DVT PPX   - Encourage ambulation

## 2024-08-22 NOTE — BH PATIENT PROFILE - NSPROEDAABILITYLEARN_GEN_A_NUR
Reason For Visit:   Chief Complaint   Patient presents with    Consult     ED followup left elbow fracture        There were no vitals taken for this visit.    Pain Assessment  Patient Currently in Pain: Denies (no pain now per pt)      Robbi Cooper ATC    
none

## 2024-08-22 NOTE — ED PROVIDER NOTE - NS ED ATTENDING STATEMENT MOD
Pt noted with elevated HR. EKG shows sinus tachycardia. Spoke with attending, will restart Atenolol (home med) at lower dose 25 mg QD and monitor BP and HR.     ADS  [id number] Attending Only

## 2024-08-22 NOTE — H&P ADULT - NSICDXPASTMEDICALHX_GEN_ALL_CORE_FT
PAST MEDICAL HISTORY:  ADHD     Autism spectrum     PTSD (post-traumatic stress disorder)     Reactive depression     Venous thromboembolism

## 2024-08-22 NOTE — ED ADULT NURSE REASSESSMENT NOTE - NS ED NURSE REASSESS COMMENT FT1
Received bedside report from previous RN. Pt lying in stretcher with unlabored respirations, denies any pain, and has no complaints at this time. 1:1 observation maintained. Pt wanded, room checklist complete (Cardiac monitor on pt and O2 flow meter stuck to wall, Jame Yadav and engineering notified). Safety maintained.

## 2024-08-22 NOTE — ED BEHAVIORAL HEALTH ASSESSMENT NOTE - DESCRIPTION
See HPI see hospitalist note Vital Signs Last 24 Hrs  T(C): 36.8 (22 Aug 2024 09:09), Max: 37.1 (22 Aug 2024 06:54)  T(F): 98.3 (22 Aug 2024 09:09), Max: 98.8 (22 Aug 2024 06:54)  HR: 80 (22 Aug 2024 09:09) (76 - 85)  BP: 106/54 (22 Aug 2024 09:09) (98/59 - 125/64)  BP(mean): 70 (22 Aug 2024 09:09) (70 - 76)  RR: 18 (22 Aug 2024 09:09) (17 - 18)  SpO2: 100% (22 Aug 2024 09:09) (99% - 100%)    Parameters below as of 22 Aug 2024 09:09  Patient On (Oxygen Delivery Method): room air

## 2024-08-22 NOTE — H&P ADULT - NS ATTEND AMEND GEN_ALL_CORE FT
24 year old female w autism spectrum disorder, major depressive vs unspecified bipolar disorder, unspecified anxiety, ADHD, PTSD and borderline personality disorder, hx VTE (after DC BCP) not on AC, developed stillborn at 33 weeks. +SI w OD welbutrin    We were consulted for required medical evaluation    Hx, ROS , exam as outlined above    DATA reviewed      #Depression  #SI and OD postpartum after loss at 33 weeks  1. management as per Psych      #Pre DM  1. follow up Hb A1c      #VTE  encourage ambulation      There are no active medical issues  Please reconsult as needed      #55 minutes

## 2024-08-22 NOTE — ED ADULT TRIAGE NOTE - CHIEF COMPLAINT QUOTE
Pt ambulatory to ED with c/o overdose. pt reports take 4 Wellbutrin pills in the last 4 hrs. Pt states "I don't feel on track lately." Pt denies SI/HI, auditory, visual, tactile disturbances. Pt c/o feeling tired. Respirations 18 in triage. per boyfriend, pt tried to take whole bottle of Wellbutrin last night and has not been acting like self. Boyfriend reports pt recently had miscarriage at 33 weeks and was wanting/asking to see baby earlier tonight. Pt does not appear lethargic or in distress in triage. Pt likes to be referred to as "Lux". hx of anxiety, depression, disassociation disorder. A&Ox3, +allergies. Sent for STAT EKG.

## 2024-08-22 NOTE — CONSULT NOTE ADULT - ASSESSMENT
MEDICAL TOXICOLOGY CONSULT    HPI:    This is a 24 year old female presenting for bupropion overdose. Per ED, at 9pm patient reportedly took 4 pills of bupropion 150mg extended release in self harm attempt. Boyfriend is at bedside with bottle and confirms 4 pills. She is not prescribed any other medications and denies other ingestions. She is currently asymptomatic. Vitals are normal and exam with no reported abnormalities including agitation or pupillary dilation. Labs with unremarkable chemistry and undetectable APAP, ASA and ETOH levels. ECG:  rate 72, NSR, QRS 70, QTc 446      PAST MEDICAL & SURGICAL HISTORY:      MEDICATION HISTORY:      FAMILY HISTORY:      PHYSICAL EXAM  Vital Signs Last 24 Hrs  T(C): 36.6 (22 Aug 2024 00:52), Max: 36.6 (22 Aug 2024 00:52)  T(F): 97.9 (22 Aug 2024 00:52), Max: 97.9 (22 Aug 2024 00:52)  HR: 76 (22 Aug 2024 00:52) (76 - 76)  BP: 125/64 (22 Aug 2024 00:52) (125/64 - 125/64)  BP(mean): 76 (22 Aug 2024 00:52) (76 - 76)  RR: 18 (22 Aug 2024 00:52) (18 - 18)  SpO2: 99% (22 Aug 2024 00:52) (99% - 99%)    Parameters below as of 22 Aug 2024 00:52  Patient On (Oxygen Delivery Method): room air        SIGNIFICANT LABORATORY STUDIES:                        12.9   12.02 )-----------( 332      ( 22 Aug 2024 02:02 )             39.6           141  |  106  |  15  ----------------------------<  89  3.7   |  26  |  0.96    Ca    9.3      22 Aug 2024 02:02    TPro  7.3  /  Alb  3.5  /  TBili  0.2  /  DBili  x   /  AST  15  /  ALT  31  /  AlkPhos  85  08-          Urinalysis Basic - ( 22 Aug 2024 02:02 )    Color: Yellow / Appearance: Clear / S.023 / pH: x  Gluc: 89 mg/dL / Ketone: Negative mg/dL  / Bili: Negative / Urobili: 0.2 mg/dL   Blood: x / Protein: Negative mg/dL / Nitrite: Negative   Leuk Esterase: Trace / RBC: 1 /HPF / WBC 8 /HPF   Sq Epi: x / Non Sq Epi: 7 /HPF / Bacteria: Few /HPF        Anion Gap: 9  02:02  CK: --  @ 02:02  Troponin:  --   @ 02:02  Pro-BNP:  --   @ 02:02  VBG:  --   @ 02:02  Carboxyhemoglobin %:  --   @ 02:02  Methemoglobin %:  --   @ 02:02  Osmolality Serum:  --   @ 02:02  Aspirin Level: <1.7<L>   @ 02:02  Acetaminophen Level:  <2<L>   @ 02:02  Ethanol Level:  --   @ 02:02  Digoxin Level:  --   @ 02:02  Phenytoin Level:  --   02:02  Carbamazepine level:  --   @ 02:02  Lamotrigine level:  --   @ 02:02      RADIOLOGIC STUDIES

## 2024-08-22 NOTE — ED PROVIDER NOTE - PHYSICAL EXAMINATION
***GEN - NAD; well appearing; A+O x3 ***HEAD - NC/AT ***EYES/NOSE - PERRL, EOMI, mucous membranes moist, no discharge ***THROAT: Oral cavity and pharynx normal. No inflammation, swelling, exudate, or lesions.  ***NECK: Neck supple, non-tender   ***PULMONARY - CTA b/l, symmetric breath sounds. ***CARDIAC -s1s2, RRR, no M,G,R  ***ABDOMEN - +BS, ND, NT, soft  ***BACK - no CVA tenderness, Normal  spine ***EXTREMITIES - symmetric pulses, 2+ dp, capillary refill < 2 seconds  ***SKIN - no rash or bruising   ***NEUROLOGIC - alert, CN 2-12 intactl, ***PSYCH - insight and judgment nl, memory nl, affect nl, thought nl. poor eye contact

## 2024-08-22 NOTE — H&P ADULT - HISTORY OF PRESENT ILLNESS
This is a 24 year old female with past medical history of venous thromboembolism, mitral valvulopathy and prediabetes and past psychiatric history of autism spectrum disorder, major depressive vs unspecified bipolar disorder, unspecified anxiety, ADHD, PTSD and borderline personality disorder who presented to the hospital for overdose on Wellbutrin. Patient reports that she had a still born birth at 33 weeks on mothers day this year. Patient reports that  she was out and saw baby clothes and items and this made her miss the baby and want to hold her. Patient states that she tried to numb her pain by taking 4 pills of Wellbutrin.  Patient  denies that this was a suicide attempt but per chart notes she attempted to take the whole bottle per boyfriend. Patient was evaluated and admitted by psychiatry. Medicine consulted for medical evaluation. Patient denies any fever, chills, chest pain, SOB, nausea, vomiting, diarrhea, headache, dizziness and all other acute complaints.

## 2024-08-22 NOTE — ED BEHAVIORAL HEALTH ASSESSMENT NOTE - RISK ASSESSMENT
CHRONIC HIGH RISK-ACUTE MODERATE RISK    ACUTE RISK FACTORS: Suicide attempt    CHRONIC RISK FACTORS: Hx of inpatient hospitalizations, MDD, BPD, Recent miscarriage     PROTECTIVE FACTORS: no violence history, medication compliance, no access to guns, no global insomnia, willingness to seek help, no  homicidal ideation, hopefulness for future.

## 2024-08-22 NOTE — ED BEHAVIORAL HEALTH ASSESSMENT NOTE - SUMMARY
Pt is a 23 yo female, non-caregiver, domiciled with boyfriend, PMHx significant for venous thromboembolism, mitral valvulopathy and prediabetes, had miscarriage at 33 weeks on Mothers day this year. PPHx autism spectrum disorder, major depressive vs unspecified bipolar disorder, unspecified anxiety, ADHD, PTSD and borderline personality disorder. Multiple prior psych admissions, 2 prior interrupted suicide attempts (overdose attempt in 2016 where a friend took the bottle of pills away & trying to drown self in bathtub in 2019 where mother had police break the door down), no known aggression or h/o legal issues, no known substance misuse, in outpatient treatment with a an individual therapist [facility]. Who presents to the ED BIB with boyfriend after she ingested 4 Wellbutrin (as per boyfriend patient tried to take the whole bottle) in a suicide attempt. Psychiatry consulted for evaluation.    Patient presents s/p self harm vs. suicide attempt in the context of grief r/t loss of baby at 33 weeks. She has been increasingly depressed, not functioning and psychiatrically decompensating. These symptoms represent a change from baseline from which the patient cannot be reasonably expected to improve with current level of care. The patient presents with risk requiring inpatient psychiatric hospitalization for safety and stabilization.

## 2024-08-22 NOTE — ED BEHAVIORAL HEALTH NOTE - BEHAVIORAL HEALTH NOTE
Patient to be admitted to .  Authorization complete:    Lauren    [phone]    ID# [id number]  Spoke with Jojo LOTT  Authorized 32 days  8/22/24-9/23/24   Auth number  [id number]

## 2024-08-22 NOTE — ED PROVIDER NOTE - OBJECTIVE STATEMENT
24-year-old female presents after intentional overdose for SI.  Patient states that she has been very depressed over the spontaneous loss of pregnancy at 33 weeks.  Today she states that she was feeling very depressed and wanted to be with her daughter  And took 4 Wellbutrin 150 mg XL tablets at about 9 PM.  Patient states she had immediate regret and came to the ED for evaluation.  She states since the loss of her pregnancy in May she has been struggling with depression and has  arrange to start seeing a therapist.  Her first appointment is this Friday 8/23.  Patient denies any chest pain, shortness of breath, HI or hallucinations.  Does have an extensive history of depression with multiple inpatient admissions.

## 2024-08-22 NOTE — ED BEHAVIORAL HEALTH ASSESSMENT NOTE - HPI (INCLUDE ILLNESS QUALITY, SEVERITY, DURATION, TIMING, CONTEXT, MODIFYING FACTORS, ASSOCIATED SIGNS AND SYMPTOMS)
Pt is a 25 yo female, non-caregiver, domiciled with boyfriend, PMHx significant for venous thromboembolism, mitral valvulopathy and prediabetes, had miscarriage at 33 weeks on Mothers day this year. PPHx autism spectrum disorder, major depressive vs unspecified bipolar disorder, unspecified anxiety, ADHD, PTSD and borderline personality disorder. Multiple prior psych admissions, 2 prior interrupted suicide attempts (overdose attempt in 2016 where a friend took the bottle of pills away & trying to drown self in bathtub in 2019 where mother had police break the door down), no known aggression or h/o legal issues, no known substance misuse, in outpatient treatment with a an individual therapist [facility]. Who presents to the ED BIB with boyfriend after she ingested 4 Wellbutrin (as per boyfriend patient tried to take the whole bottle) in a suicide attempt. Psychiatry consulted for evaluation.    Patients depressed, with blunted affect, reports she has felt horrible since she miscarried at 33 weeks on mothers day this year. She reports yesterday she was out and saw baby clothes and items and this made her miss the baby and want to hold her. Around midnight patient admits to taking 4 pills of Wellbutrin to "feel numb" she denies this was a suicide attempt but per chart notes she was attempted to take the whole bottle per boyfriend. She reports she has no coping skills, is trying to get connected to grief counseling, has a psychiatrist but only for medication management. She reports feeling the worst she ever has and cannot deal with this grief. She displays poor insight to situation and does not want psychiatric hospitalization despite being unable to connect to care to help her through this loss.

## 2024-08-22 NOTE — ED ADULT NURSE REASSESSMENT NOTE - NS ED NURSE REASSESS COMMENT FT1
Pt offered menu for breakfast, states shes not hungry at this time. 1:1 maintained, safety and comfort maintained. Pt offered menu for breakfast, states she's not hungry at this time. Pt appears tearful and anxious due to involuntary admission, MD Fuentes made aware. 1:1 maintained, safety and comfort maintained.

## 2024-08-22 NOTE — ED PROVIDER NOTE - PROGRESS NOTE DETAILS
Dr. Fuentes ED attending- received sign out from Dr Rowe, pt medically cleared at 0900. On assessment patient is well-appearing, has no medical complaints, is in good spirits, sitting up in bed drinking water, neurologically intact.  Will proceed with psych consult Dr. Fuentes ED attending- pt seen and evaluated by psych NP Jojo who recommends involuntary admission to Dr. Langston psych attending for intentional SI attempt

## 2024-08-22 NOTE — H&P ADULT - NSHPPHYSICALEXAM_GEN_ALL_CORE
Vital Signs Last 24 Hrs  T(C): 36.6 (08-22-24 @ 17:42), Max: 37.1 (08-22-24 @ 06:54)  T(F): 97.8 (08-22-24 @ 17:42), Max: 98.8 (08-22-24 @ 06:54)  HR: 92 (08-22-24 @ 16:45) (72 - 92)  BP: 115/62 (08-22-24 @ 16:45) (98/59 - 130/72)  BP(mean): 76 (08-22-24 @ 16:45) (70 - 97)  RR: 16 (08-22-24 @ 17:42) (16 - 18)  SpO2: 99% (08-22-24 @ 17:42) (97% - 100%)    Orthostatic VS  08-22-24 @ 17:42  Lying BP: --/-- HR: --  Sitting BP: 124/62 HR: 82  Standing BP: 108/60 HR: 89  Site: upper left arm  Mode: electronic

## 2024-08-22 NOTE — BH PATIENT PROFILE - INFORMATION PROVIDED TO:
This 4-year-old is about 10 days out from a forearm fracture that was reduced it. It has settled somewhat and he has almost 29  of dorsal angulation. She's comfortable is able to move his fingers well and has no numbness or tingling. We switched him to a different cast and try to mold the cast but were unable to change the reduction at all. He will return to see us in 2 weeks for an anticipated cast change and also for x-rays. I have answered all their questions.  
patient

## 2024-08-23 DIAGNOSIS — F43.21 ADJUSTMENT DISORDER WITH DEPRESSED MOOD: ICD-10-CM

## 2024-08-23 DIAGNOSIS — F84.0 AUTISTIC DISORDER: ICD-10-CM

## 2024-08-23 LAB
A1C WITH ESTIMATED AVERAGE GLUCOSE RESULT: 5.2 % — SIGNIFICANT CHANGE UP (ref 4–5.6)
CHOLEST SERPL-MCNC: 159 MG/DL — SIGNIFICANT CHANGE UP
ESTIMATED AVERAGE GLUCOSE: 103 MG/DL — SIGNIFICANT CHANGE UP (ref 68–114)
HDLC SERPL-MCNC: 40 MG/DL — LOW
LIPID PNL WITH DIRECT LDL SERPL: 90 MG/DL — SIGNIFICANT CHANGE UP
NON HDL CHOLESTEROL: 119 MG/DL — SIGNIFICANT CHANGE UP
TRIGL SERPL-MCNC: 167 MG/DL — HIGH
TSH SERPL-MCNC: 1.52 UU/ML — SIGNIFICANT CHANGE UP (ref 0.34–4.82)

## 2024-08-23 PROCEDURE — 99232 SBSQ HOSP IP/OBS MODERATE 35: CPT

## 2024-08-23 RX ADMIN — Medication 50 MILLIGRAM(S): at 20:36

## 2024-08-23 RX ADMIN — Medication 5 MILLIGRAM(S): at 20:36

## 2024-08-23 NOTE — BH INPATIENT PSYCHIATRY ASSESSMENT NOTE - NSDCCRITERIA_PSY_ALL_CORE
acute resolution of the pt's more acute worsening of anxious depression   ongoing unresolved grief counselling  updated safety planning and pt ongoing work on coping skills and safety planning in and out of hospital

## 2024-08-23 NOTE — BH INPATIENT PSYCHIATRY ASSESSMENT NOTE - PATIENT'S CHIEF COMPLAINT
" I wasn't wanting to kill myself . I was just wanting to reset how I was feeling. So I took the 4 pills and it worked. I felt like things were level again."

## 2024-08-23 NOTE — BH INPATIENT PSYCHIATRY ASSESSMENT NOTE - NSICDXBHSECONDARYDX_PSY_ALL_CORE
Borderline personality disorder   F60.3  Autism spectrum disorder   F84.0  Unresolved grief   F43.21

## 2024-08-23 NOTE — BH SOCIAL WORK INITIAL PSYCHOSOCIAL EVALUATION - NSBHTREATHXADM_PSY_ALL_CORE
Population Health Chart Review & Patient Outreach Details      Additional [facility] Health Notes:               Updates Requested / Reviewed:      Updated Care Coordination Note, Care Everywhere, and Immunizations Reconciliation Completed or Queried: [facility] Topics Overdue:      [facility] Score: 5     Colon Cancer Screening  Urine Screening  Eye Exam  Hemoglobin A1c  Foot Exam    Pneumonia Vaccine  Shingles/Zoster Vaccine  RSV Vaccine                  Health Maintenance Topic(s) Outreach Outcomes & Actions Taken:    Primary Care Appt - Outreach Outcomes & Actions Taken  : Pt Established with External Provider, Updated Care Team, & Informed Pt to Notify Payor if Applicable    
Yes

## 2024-08-23 NOTE — BH INPATIENT PSYCHIATRY ASSESSMENT NOTE - NSBHMETABOLIC_PSY_ALL_CORE_FT
BMI: BMI (kg/m2): 34.9 (08-22-24 @ 17:42)  HbA1c:   Glucose:   BP: 115/62 (08-22-24 @ 16:45) (98/59 - 130/72)Vital Signs Last 24 Hrs  T(C): 36.7 (08-23-24 @ 07:04), Max: 36.7 (08-22-24 @ 16:45)  T(F): 98 (08-23-24 @ 07:04), Max: 98.1 (08-22-24 @ 16:45)  HR: 92 (08-22-24 @ 16:45) (72 - 92)  BP: 115/62 (08-22-24 @ 16:45) (115/62 - 130/72)  BP(mean): 76 (08-22-24 @ 16:45) (76 - 76)  RR: 18 (08-23-24 @ 07:04) (16 - 18)  SpO2: 96% (08-23-24 @ 07:04) (96% - 99%)    Orthostatic VS  08-23-24 @ 07:04  Lying BP: --/-- HR: --  Sitting BP: 102/61 HR: 88  Standing BP: 120/94 HR: 108  Site: upper right arm  Mode: electronic  Orthostatic VS  08-22-24 @ 17:42  Lying BP: --/-- HR: --  Sitting BP: 124/62 HR: 82  Standing BP: 108/60 HR: 89  Site: upper left arm  Mode: electronic    Lipid Panel:

## 2024-08-23 NOTE — BH SOCIAL WORK INITIAL PSYCHOSOCIAL EVALUATION - NSBHHOUSECOMMENTFT_PSY_ALL_CORE
Pt reports that she resides with Gadiel CRUZ [phone]. Pt denies domestic violence or abuse and feels safe at home.

## 2024-08-23 NOTE — BH INPATIENT PSYCHIATRY ASSESSMENT NOTE - CURRENT MEDICATION
MEDICATIONS  (STANDING):    MEDICATIONS  (PRN):  acetaminophen     Tablet .. 650 milliGRAM(s) Oral every 6 hours PRN Temp greater or equal to 38C (100.4F), Mild Pain (1 - 3), Moderate Pain (4 - 6)  aluminum hydroxide/magnesium hydroxide/simethicone Suspension 30 milliLiter(s) Oral every 6 hours PRN Dyspepsia  haloperidol     Tablet 5 milliGRAM(s) Oral every 6 hours PRN moderate agitation  hydrOXYzine hydrochloride 50 milliGRAM(s) Oral every 6 hours PRN Anxiety  magnesium hydroxide Suspension 30 milliLiter(s) Oral daily PRN Constipation

## 2024-08-23 NOTE — BH INPATIENT PSYCHIATRY ASSESSMENT NOTE - RISK ASSESSMENT
The pt currently presents as a low risk for suicide or for impulsive aggression though she remains a chronic risk for self harm in the context of her waxing and waning borderline personality d/o suicidal ideation .   Acute risk factors- s/p pt OD Wellbutrin tabs ( 4) in an apparent suicide attempt interrupted by her bf, ongoing pt grief reaction due to pt miscarriage at 33 weeks on Mother's Day 2024  Chronic risk factors- longstanding pt affective dysregulation and waxing and waning SI as above, pt h/o prior suicide attempts in 2016 via an OD pills and in 2019 via pt attempt to drown self at home, pt h/o multiple inpatient hospital admissions with similar presentation  Protective factors- pt has family support, residential stability, BF x the past 6 years, pt remains future oriented despite affective dysregulation  Mitigating factors- pt currently in safe setting of hospital and pt is amenable to ongoing treatment with medication and primary DBT and CBT , Mindfulness therapies

## 2024-08-23 NOTE — BH SOCIAL WORK INITIAL PSYCHOSOCIAL EVALUATION - NSBHCHILDEVENTS_PSY_ALL_CORE
Stable childhood/Adopted Pt reports that she was adopted at birth and has on and off contact with her. Pt reports being adopted has caused attachment and abandonment issues./Stable childhood/Adopted

## 2024-08-23 NOTE — BH SOCIAL WORK INITIAL PSYCHOSOCIAL EVALUATION - DETAILS
Pt reports that her biological mother was dx with bipolar, borderline personality disorder and substance abuse issues.

## 2024-08-23 NOTE — BH SOCIAL WORK INITIAL PSYCHOSOCIAL EVALUATION - NSBHPROFILEREVIEW_PSY_ALL_CORE
Yes Per ED psych eval, "Pt is a 23 yo female, non-caregiver, domiciled with boyfriend, PMHx significant for venous thromboembolism, mitral valvulopathy and prediabetes, had miscarriage at 33 weeks on Mothers day this year. PPHx autism spectrum disorder, major depressive vs unspecified bipolar disorder, unspecified anxiety, ADHD, PTSD and borderline personality disorder. Multiple prior psych admissions, 2 prior interrupted suicide attempts (overdose attempt in 2016 where a friend took the bottle of pills away & trying to drown self in bathtub in 2019 where mother had police break the door down), no known aggression or h/o legal issues, no known substance misuse, in outpatient treatment with a an individual [facility]. Who presents to the ED BIB with boyfriend after she ingested 4 Wellbutrin (as per boyfriend patient tried to take the whole bottle) in a suicide attempt. Psychiatry consulted for evaluation.    Patients depressed, with blunted affect, reports she has felt horrible since she miscarried at 33 weeks on mothers day this year. She reports yesterday she was out and saw baby clothes and items and this made her miss the baby and want to hold her. Around midnight patient admits to taking 4 pills of Wellbutrin to "feel numb" she denies this was a suicide attempt but per chart notes she was attempted to take the whole bottle per boyfriend. She reports she has no coping skills, is trying to get connected to grief counseling, has a psychiatrist but only for medication management. She reports feeling the worst she ever has and cannot deal with this grief. She displays poor insight to situation and does not want psychiatric hospitalization despite being unable to connect to care to help her through this loss"./Yes

## 2024-08-23 NOTE — BH INPATIENT PSYCHIATRY ASSESSMENT NOTE - NSBHCHARTREVIEWVS_PSY_A_CORE FT
Vital Signs Last 24 Hrs  T(C): 36.7 (08-23-24 @ 07:04), Max: 36.7 (08-22-24 @ 16:45)  T(F): 98 (08-23-24 @ 07:04), Max: 98.1 (08-22-24 @ 16:45)  HR: 92 (08-22-24 @ 16:45) (72 - 92)  BP: 115/62 (08-22-24 @ 16:45) (115/62 - 130/72)  BP(mean): 76 (08-22-24 @ 16:45) (76 - 76)  RR: 18 (08-23-24 @ 07:04) (16 - 18)  SpO2: 96% (08-23-24 @ 07:04) (96% - 99%)    Orthostatic VS  08-23-24 @ 07:04  Lying BP: --/-- HR: --  Sitting BP: 102/61 HR: 88  Standing BP: 120/94 HR: 108  Site: upper right arm  Mode: electronic  Orthostatic VS  08-22-24 @ 17:42  Lying BP: --/-- HR: --  Sitting BP: 124/62 HR: 82  Standing BP: 108/60 HR: 89  Site: upper left arm  Mode: electronic

## 2024-08-23 NOTE — BH SOCIAL WORK INITIAL PSYCHOSOCIAL EVALUATION - NSBHSUPPORTOTHER_PSY_ALL_CORE
No Pt reports a positive relationship with her mother Brittani [phone] and her boyfriend Gadiel [phone] and gives consent for tx team to contact them./No

## 2024-08-23 NOTE — BH INPATIENT PSYCHIATRY ASSESSMENT NOTE - NSCOMMENTSUICRISKFACT_PSY_ALL_CORE
Though the pt has reported a h/o long standing affective dysregulation and a h/o waxing and waning SI in the context of a borderline personality d/o, the pt currently denies any active SI /HI/AH .

## 2024-08-23 NOTE — BH INPATIENT PSYCHIATRY ASSESSMENT NOTE - HPI (INCLUDE ILLNESS QUALITY, SEVERITY, DURATION, TIMING, CONTEXT, MODIFYING FACTORS, ASSOCIATED SIGNS AND SYMPTOMS)
The pt is a  24 year-old non binary adopted   female, non-caregiver, domiciled with her  boyfriend of 6 years, PMH significant for venous thromboembolism, mitral valvuloplasty at age 19 and prediabetes, who  had a miscarriage at 33 weeks on Mothers day this year. Past psychiatric h/o  autism spectrum disorder, major depressive vs unspecified bipolar disorder, unspecified anxiety, ADHD, PTSD, Dissociative Identity d/o  and borderline personality disorder. Multiple prior psych admissions, 2 prior interrupted suicide attempts (overdose attempt in 2016 where a friend took the bottle of pills away & trying to drown self in bathtub in 2019 where mother had police break the door down), no known aggression or h/o legal issues, no known substance misuse, in outpatient treatment with a an individual therapist [facility].  The pt presented to the [facility] ED on 8/22/24 with her  boyfriend after she reportedly ingested 4 Wellbutrin (as per boyfriend patient tried to take the whole bottle) in a suicide attempt. Psychiatry consulted for evaluation.  Per the initial ED evaluation on 8/22/24:  Patients depressed, with blunted affect, reports she has felt horrible since she miscarried at 33 weeks on mothers day this year. She reports yesterday she was out and saw baby clothes and items and this made her miss the baby and want to hold her. Around midnight patient admits to taking 4 pills of Wellbutrin to "feel numb" she denies this was a suicide attempt but per chart notes she was attempted to take the whole bottle per boyfriend. She reports she has no coping skills, is trying to get connected to grief counseling, has a psychiatrist but only for medication management. She reports feeling the worst she ever has and cannot deal with this grief. She displays poor insight to situation and does not want psychiatric hospitalization despite being unable to connect to care to help her through this loss.   The pt was then admitted to [address] inpatient psychiatry on 8/22/24 on a 9.39 emergency legal status for acute pt safety and for ongoing pt evaluation and treatment of her longstanding affective dysregulation .

## 2024-08-23 NOTE — BH SOCIAL WORK INITIAL PSYCHOSOCIAL EVALUATION - OTHER PAST PSYCHIATRIC HISTORY (INCLUDE DETAILS REGARDING ONSET, COURSE OF ILLNESS, INPATIENT/OUTPATIENT TREATMENT)
Pt reports  Pt reports that she was dx with depression, anxiety, ADHD, and PTSD when she was 16 y/o, hx of inpt hospitalizations due to depression, 2 aborted suicide attempts, and hx of self-harming. Pt discussed how she took 4 Wellbutrin pills as a way to "numb herself" and not a suicide attempt. Pt discussed how she has increased depression due to miscarriage at 33 weeks. Pt reports that she has difficulty remembering to take her medication and was not consistent with her medication prior to admission.

## 2024-08-23 NOTE — BH INPATIENT PSYCHIATRY ASSESSMENT NOTE - DESCRIPTION
See HPI   The pt was adopted as an infant . The pt's biological mother reportedly had a h/o bipolar d/o , substance use d/o and  borderline personality d/o

## 2024-08-24 PROCEDURE — 99232 SBSQ HOSP IP/OBS MODERATE 35: CPT

## 2024-08-24 RX ADMIN — Medication 25 MILLIGRAM(S): at 20:19

## 2024-08-24 NOTE — BH INPATIENT PSYCHIATRY PROGRESS NOTE - NSBHFUPINTERVALHXFT_PSY_A_CORE
PT is in bed, talkative cooperative, Makes good eye contact and states that she does not want haldol Her mother was on Seroquel.  She reports' insomnia, could not sleep. Continue to report depression and anxiety.

## 2024-08-25 PROCEDURE — 99232 SBSQ HOSP IP/OBS MODERATE 35: CPT

## 2024-08-25 RX ADMIN — Medication 25 MILLIGRAM(S): at 20:27

## 2024-08-25 NOTE — BH INPATIENT PSYCHIATRY PROGRESS NOTE - NSBHFUPINTERVALHXFT_PSY_A_CORE
PT is in bed, Casually dressed, talkative, cooperative, Makes good eye contact. She reports she responded well to Seroquel slept all night.  There is no side effects observed or reported by patient.    Patient also says she would like to go on Wellbutrin because that was her medication she was taking at home.    She continues to be depressed and anxious however she reports feeling better and started talking about being discharged.  Denies hearing voices seeing things and denies thoughts of harming herself.

## 2024-08-26 PROCEDURE — 99232 SBSQ HOSP IP/OBS MODERATE 35: CPT

## 2024-08-26 RX ADMIN — Medication 25 MILLIGRAM(S): at 21:05

## 2024-08-26 NOTE — BH INPATIENT PSYCHIATRY PROGRESS NOTE - NSBHFUPINTERVALHXFT_PSY_A_CORE
Covering MD Note ( 8/24/24)PT is in bed, Casually dressed, talkative, cooperative, Makes good eye contact. She reports she responded well to Seroquel slept all night.  There is no side effects observed or reported by patient.  Patient also says she would like to go on Wellbutrin because that was her medication she was taking at home.  Covering MD Note ( 8/25/24)She continues to be depressed and anxious however she reports feeling better and started talking about being discharged.  Denies hearing voices seeing things and denies thoughts of harming herself.  8/26/24 Pt seen and discussed with treatment team. The pt continues to clinically improve with now pt reported good sleep and appetite and no further c/o depressed mood or SI. The pt has been attending to her ADLs and she has been visible on the unit with a similar aged peer group. The pt continues to journal and to attend therapy groups with pt focus on CBT , DBT and mindfulness techniques to further aid with safety planning. The pt is tolerating her low dose hs Seroquel 25 mg po qhs with good clinical effect and with no reported adverse effects. The pt continues to deny any SI /HI /AH and she continues to present as a low risk for suicide or for impulsive aggression at this time. Disposition planning is ongoing with plan for the pt to continue with current streamlined  and effective med regimen.

## 2024-08-27 PROCEDURE — 99232 SBSQ HOSP IP/OBS MODERATE 35: CPT

## 2024-08-27 RX ADMIN — Medication 25 MILLIGRAM(S): at 20:56

## 2024-08-28 PROCEDURE — 99232 SBSQ HOSP IP/OBS MODERATE 35: CPT

## 2024-08-28 RX ADMIN — Medication 25 MILLIGRAM(S): at 20:50

## 2024-08-28 NOTE — BH DISCHARGE NOTE NURSING/SOCIAL WORK/PSYCH REHAB - NSDCADDINFO1FT_PSY_ALL_CORE
You have a telehealth appt with Dr. Hale to continue your medication management. Please call the number above if you need to reschedule.

## 2024-08-28 NOTE — BH DISCHARGE NOTE NURSING/SOCIAL WORK/PSYCH REHAB - NSCDUDCCRISIS_PSY_A_CORE
[facility]  1 [phone] [facility]-WELL ([phone])  Text "WELL" to [phone]  Website: www.[employer]/.[facility] 1 [phone] 621-HOPE (3600) Website: www.safehorizon.org/.  [facility] – Crisis Care for Children, Adults and Families  [address] [place] [address]  Mobile Crisis Hotline – (431) 763-4850/.National Suicide Prevention Lifeline 2 (504) 990-8126/.  [facility]  1 (832) LIF[facility] (001-8121)/.  [facility]  (116) 271-8561/.  [facility] Behavioral Health Helpline / [facility] Mobile Crisis  (569) 766-PJRE (6304)/.  [facility] Response Crisis Hotline  (888) 952-9472  24 hour telephone crisis intervention and suicide prevention hotline concerned with all mental health issues/.  [facility]  [address] [place] [address] [phone]) [phone]   Hours:  Monday through Friday from 9 AM to 3 PM/Other.../988 Suicide and Crisis Lifeline

## 2024-08-28 NOTE — BH INPATIENT PSYCHIATRY PROGRESS NOTE - NSBHATTESTBILLING_PSY_A_CORE
43294-Ijkhjdovlv OBS or IP - moderate complexity OR 35-49 mins
25879-Cowqsyksfd OBS or IP - moderate complexity OR 35-49 mins
16444-Ebekvbzklw OBS or IP - moderate complexity OR 35-49 mins
54601-Mxwbjfeyhm OBS or IP - moderate complexity OR 35-49 mins
32743-Emafqyuhjw OBS or IP - moderate complexity OR 35-49 mins
11697-Gpomrplxgy OBS or IP - moderate complexity OR 35-49 mins

## 2024-08-28 NOTE — BH INPATIENT PSYCHIATRY PROGRESS NOTE - PRN MEDS
MEDICATIONS  (PRN):  acetaminophen     Tablet .. 650 milliGRAM(s) Oral every 6 hours PRN Temp greater or equal to 38C (100.4F), Mild Pain (1 - 3), Moderate Pain (4 - 6)  aluminum hydroxide/magnesium hydroxide/simethicone Suspension 30 milliLiter(s) Oral every 6 hours PRN Dyspepsia  haloperidol     Tablet 5 milliGRAM(s) Oral every 6 hours PRN moderate agitation  hydrOXYzine hydrochloride 50 milliGRAM(s) Oral every 6 hours PRN Anxiety  magnesium hydroxide Suspension 30 milliLiter(s) Oral daily PRN Constipation  QUEtiapine 25 milliGRAM(s) Oral every 6 hours PRN mild agitation  
MEDICATIONS  (PRN):  acetaminophen     Tablet .. 650 milliGRAM(s) Oral every 6 hours PRN Temp greater or equal to 38C (100.4F), Mild Pain (1 - 3), Moderate Pain (4 - 6)  aluminum hydroxide/magnesium hydroxide/simethicone Suspension 30 milliLiter(s) Oral every 6 hours PRN Dyspepsia  haloperidol     Tablet 5 milliGRAM(s) Oral every 6 hours PRN moderate agitation  hydrOXYzine hydrochloride 50 milliGRAM(s) Oral every 6 hours PRN Anxiety  magnesium hydroxide Suspension 30 milliLiter(s) Oral daily PRN Constipation  QUEtiapine 25 milliGRAM(s) Oral every 6 hours PRN midl agitation  
MEDICATIONS  (PRN):  acetaminophen     Tablet .. 650 milliGRAM(s) Oral every 6 hours PRN Temp greater or equal to 38C (100.4F), Mild Pain (1 - 3), Moderate Pain (4 - 6)  aluminum hydroxide/magnesium hydroxide/simethicone Suspension 30 milliLiter(s) Oral every 6 hours PRN Dyspepsia  haloperidol     Tablet 5 milliGRAM(s) Oral every 6 hours PRN moderate agitation  hydrOXYzine hydrochloride 50 milliGRAM(s) Oral every 6 hours PRN Anxiety  magnesium hydroxide Suspension 30 milliLiter(s) Oral daily PRN Constipation  QUEtiapine 25 milliGRAM(s) Oral every 6 hours PRN mild agitation  

## 2024-08-28 NOTE — BH DISCHARGE NOTE NURSING/SOCIAL WORK/PSYCH REHAB - NSDCNEXTLEVEL_PSY_ALL_CORE
The discharge note was reviewed with the patient and the patient has agreed to receive a copy of the SW/Nurse/MD discharge note in the patient portal./Yes

## 2024-08-28 NOTE — BH INPATIENT PSYCHIATRY PROGRESS NOTE - NSBHMSESPABN_PSY_A_CORE
Soft volume/Slowed rate/Decreased productivity/Increased latency

## 2024-08-28 NOTE — BH INPATIENT PSYCHIATRY PROGRESS NOTE - NSBHMSEAFFRANGE_PSY_A_CORE
.Please place or confirm pending lab orders for THIS MORNINGS lab appointment.  Thanks,  Myrna    ....Your patient was in for lab test today and there are pending orders in Epic. I drew JIC tubes and had your pt leave a urine sample.  Please review and tag any additional orders to the lab appointment or enter orders as a future and I will watch for them.  Thank you   Myrna   @ [facility]      
Sign labs, patient was drawn this morning. Then close encounter.    Sendy Alvarez,     
Blunted

## 2024-08-28 NOTE — BH DISCHARGE NOTE NURSING/SOCIAL WORK/PSYCH REHAB - PATIENT PORTAL LINK FT
You can access the FollowMyHealth Patient Portal offered by [facility] by registering at the following website: [email or web address]. By joining [employer]’s FollowMyHealth portal, you will also be able to view your health information using other applications (apps) compatible with our system.

## 2024-08-28 NOTE — BH INPATIENT PSYCHIATRY PROGRESS NOTE - NSBHFUPINTERVALCCFT_PSY_A_CORE
I don't want haldol 
   " I am feeling a lot better now. I slept very well with the Seroquel at night." 
   " I am feeling a lot better now. I slept very well with the Seroquel at night." 
 " I think I am starting to feel better. I think about my dog a lot but I am ok."    On 8/27/24 this writer had a lengthy phone conversation with the pt's Amharic speaking only mother Lona (  ID [phone] Ashlee) to review pt diagnosis of bipolar d/o and current med regimen of Risperdal and Lithium. The pt's mother was tearful at times during the discussion bt writer was able to provide some hope to her and the pt as bipolar d/o is a treatable d/o from which patients can recover . Writer met in person with the pt and his mother later this afternoon and writer gave the pt 's mother psychoeducational information in Amharic as to bipolar d./o and as to Risperdal and Lithium both currently being used to treat the pt's illness. Pt's mother spoke earlier of the pt's fears that the phones and the computers at home were being hacked and that the pt was being " watched" and spied upon by unknown others. 
I slept better 
 " I am feeling good now. Ready to leave."

## 2024-08-28 NOTE — BH INPATIENT PSYCHIATRY PROGRESS NOTE - NSBHMETABOLIC_PSY_ALL_CORE_FT
BMI: BMI (kg/m2): 34.9 (08-22-24 @ 17:42)  HbA1c: A1C with Estimated Average Glucose Result: 5.2 % (08-23-24 @ 07:14)    Glucose:   BP: --Vital Signs Last 24 Hrs  T(C): 36.9 (08-28-24 @ 07:03), Max: 36.9 (08-28-24 @ 07:03)  T(F): 98.4 (08-28-24 @ 07:03), Max: 98.4 (08-28-24 @ 07:03)  HR: --  BP: --  BP(mean): --  RR: 18 (08-28-24 @ 07:03) (18 - 18)  SpO2: 100% (08-28-24 @ 07:03) (100% - 100%)    Orthostatic VS  08-28-24 @ 07:03  Lying BP: --/-- HR: --  Sitting BP: 107/57 HR: 89  Standing BP: 105/54 HR: 100  Site: upper right arm  Mode: electronic  Orthostatic VS  08-27-24 @ 08:26  Lying BP: --/-- HR: --  Sitting BP: 101/62 HR: 83  Standing BP: 110/62 HR: 94  Site: upper right arm  Mode: electronic    Lipid Panel: Date/Time: 08-23-24 @ 07:14  Cholesterol, Serum: 159  LDL Cholesterol Calculated: 90  HDL Cholesterol, Serum: 40  Total Cholesterol/HDL Ration Measurement: --  Triglycerides, Serum: 167  
BMI: BMI (kg/m2): 34.9 (08-22-24 @ 17:42)  HbA1c: A1C with Estimated Average Glucose Result: 5.2 % (08-23-24 @ 07:14)    Glucose:   BP: --Vital Signs Last 24 Hrs  T(C): 36.8 (08-27-24 @ 08:26), Max: 36.8 (08-27-24 @ 08:26)  T(F): 98.3 (08-27-24 @ 08:26), Max: 98.3 (08-27-24 @ 08:26)  HR: --  BP: --  BP(mean): --  RR: 18 (08-27-24 @ 08:26) (18 - 18)  SpO2: 99% (08-27-24 @ 08:26) (99% - 99%)    Orthostatic VS  08-27-24 @ 08:26  Lying BP: --/-- HR: --  Sitting BP: 101/62 HR: 83  Standing BP: 110/62 HR: 94  Site: upper right arm  Mode: electronic  Orthostatic VS  08-26-24 @ 07:27  Lying BP: --/-- HR: --  Sitting BP: 114/69 HR: 80  Standing BP: 102/64 HR: 95  Site: upper right arm  Mode: electronic    Lipid Panel: Date/Time: 08-23-24 @ 07:14  Cholesterol, Serum: 159  LDL Cholesterol Calculated: 90  HDL Cholesterol, Serum: 40  Total Cholesterol/HDL Ration Measurement: --  Triglycerides, Serum: 167  
BMI: BMI (kg/m2): 34.9 (08-22-24 @ 17:42)  HbA1c: A1C with Estimated Average Glucose Result: 5.2 % (08-23-24 @ 07:14)    Glucose:   BP: --Vital Signs Last 24 Hrs  T(C): 36.8 (08-27-24 @ 08:26), Max: 36.8 (08-27-24 @ 08:26)  T(F): 98.3 (08-27-24 @ 08:26), Max: 98.3 (08-27-24 @ 08:26)  HR: --  BP: --  BP(mean): --  RR: 18 (08-27-24 @ 08:26) (18 - 18)  SpO2: 99% (08-27-24 @ 08:26) (99% - 99%)    Orthostatic VS  08-27-24 @ 08:26  Lying BP: --/-- HR: --  Sitting BP: 101/62 HR: 83  Standing BP: 110/62 HR: 94  Site: upper right arm  Mode: electronic  Orthostatic VS  08-26-24 @ 07:27  Lying BP: --/-- HR: --  Sitting BP: 114/69 HR: 80  Standing BP: 102/64 HR: 95  Site: upper right arm  Mode: electronic    Lipid Panel: Date/Time: 08-23-24 @ 07:14  Cholesterol, Serum: 159  LDL Cholesterol Calculated: 90  HDL Cholesterol, Serum: 40  Total Cholesterol/HDL Ration Measurement: --  Triglycerides, Serum: 167  
BMI: BMI (kg/m2): 34.9 (08-22-24 @ 17:42)  HbA1c: A1C with Estimated Average Glucose Result: 5.2 % (08-23-24 @ 07:14)    Glucose:   BP: --Vital Signs Last 24 Hrs  T(C): 36.7 (08-26-24 @ 07:27), Max: 36.7 (08-26-24 @ 07:27)  T(F): 98.1 (08-26-24 @ 07:27), Max: 98.1 (08-26-24 @ 07:27)  HR: --  BP: --  BP(mean): --  RR: 18 (08-26-24 @ 07:27) (18 - 18)  SpO2: 100% (08-26-24 @ 07:27) (100% - 100%)    Orthostatic VS  08-26-24 @ 07:27  Lying BP: --/-- HR: --  Sitting BP: 114/69 HR: 80  Standing BP: 102/64 HR: 95  Site: upper right arm  Mode: electronic  Orthostatic VS  08-25-24 @ 07:14  Lying BP: --/-- HR: --  Sitting BP: 111/72 HR: 95  Standing BP: 107/64 HR: 102  Site: upper right arm  Mode: electronic    Lipid Panel: Date/Time: 08-23-24 @ 07:14  Cholesterol, Serum: 159  LDL Cholesterol Calculated: 90  HDL Cholesterol, Serum: 40  Total Cholesterol/HDL Ration Measurement: --  Triglycerides, Serum: 167  
BMI: BMI (kg/m2): 34.9 (08-22-24 @ 17:42)  HbA1c: A1C with Estimated Average Glucose Result: 5.2 % (08-23-24 @ 07:14)    Glucose:   BP: 115/62 (08-22-24 @ 16:45) (98/59 - 130/72)Vital Signs Last 24 Hrs  T(C): 36.5 (08-24-24 @ 07:03), Max: 36.5 (08-24-24 @ 07:03)  T(F): 97.7 (08-24-24 @ 07:03), Max: 97.7 (08-24-24 @ 07:03)  HR: --  BP: --  BP(mean): --  RR: 18 (08-24-24 @ 07:03) (18 - 18)  SpO2: 98% (08-24-24 @ 07:03) (98% - 98%)    Orthostatic VS  08-24-24 @ 07:03  Lying BP: --/-- HR: --  Sitting BP: 110/65 HR: 84  Standing BP: 102/62 HR: 99  Site: upper right arm  Mode: electronic  Orthostatic VS  08-23-24 @ 07:04  Lying BP: --/-- HR: --  Sitting BP: 102/61 HR: 88  Standing BP: 120/94 HR: 108  Site: upper right arm  Mode: electronic  Orthostatic VS  08-22-24 @ 17:42  Lying BP: --/-- HR: --  Sitting BP: 124/62 HR: 82  Standing BP: 108/60 HR: 89  Site: upper left arm  Mode: electronic    Lipid Panel: Date/Time: 08-23-24 @ 07:14  Cholesterol, Serum: 159  LDL Cholesterol Calculated: 90  HDL Cholesterol, Serum: 40  Total Cholesterol/HDL Ration Measurement: --  Triglycerides, Serum: 167  
BMI: BMI (kg/m2): 34.9 (08-22-24 @ 17:42)  HbA1c: A1C with Estimated Average Glucose Result: 5.2 % (08-23-24 @ 07:14)    Glucose:   BP: 115/62 (08-22-24 @ 16:45) (115/62 - 130/72)Vital Signs Last 24 Hrs  T(C): 36.8 (08-25-24 @ 07:14), Max: 36.8 (08-25-24 @ 07:14)  T(F): 98.2 (08-25-24 @ 07:14), Max: 98.2 (08-25-24 @ 07:14)  HR: --  BP: --  BP(mean): --  RR: 18 (08-25-24 @ 07:14) (18 - 18)  SpO2: 98% (08-25-24 @ 07:14) (98% - 98%)    Orthostatic VS  08-25-24 @ 07:14  Lying BP: --/-- HR: --  Sitting BP: 111/72 HR: 95  Standing BP: 107/64 HR: 102  Site: upper right arm  Mode: electronic  Orthostatic VS  08-24-24 @ 07:03  Lying BP: --/-- HR: --  Sitting BP: 110/65 HR: 84  Standing BP: 102/62 HR: 99  Site: upper right arm  Mode: electronic    Lipid Panel: Date/Time: 08-23-24 @ 07:14  Cholesterol, Serum: 159  LDL Cholesterol Calculated: 90  HDL Cholesterol, Serum: 40  Total Cholesterol/HDL Ration Measurement: --  Triglycerides, Serum: 167

## 2024-08-28 NOTE — BH INPATIENT PSYCHIATRY PROGRESS NOTE - NSTXDCOTHRGOAL_PSY_ALL_CORE
Patient will be referred to the appropriate level of outpatient treatment and have appointments within 3-5 days of discharge.  Patient will be discharged to safe housing either back home or DSS emergency housing.   will explore need for duel diagnosis tx with appointments if needed.  Benefits in place

## 2024-08-28 NOTE — BH INPATIENT PSYCHIATRY PROGRESS NOTE - CURRENT MEDICATION
MEDICATIONS  (STANDING):  QUEtiapine 25 milliGRAM(s) Oral at bedtime    MEDICATIONS  (PRN):  acetaminophen     Tablet .. 650 milliGRAM(s) Oral every 6 hours PRN Temp greater or equal to 38C (100.4F), Mild Pain (1 - 3), Moderate Pain (4 - 6)  aluminum hydroxide/magnesium hydroxide/simethicone Suspension 30 milliLiter(s) Oral every 6 hours PRN Dyspepsia  haloperidol     Tablet 5 milliGRAM(s) Oral every 6 hours PRN moderate agitation  hydrOXYzine hydrochloride 50 milliGRAM(s) Oral every 6 hours PRN Anxiety  magnesium hydroxide Suspension 30 milliLiter(s) Oral daily PRN Constipation  QUEtiapine 25 milliGRAM(s) Oral every 6 hours PRN mild agitation  
MEDICATIONS  (STANDING):  QUEtiapine 25 milliGRAM(s) Oral at bedtime    MEDICATIONS  (PRN):  acetaminophen     Tablet .. 650 milliGRAM(s) Oral every 6 hours PRN Temp greater or equal to 38C (100.4F), Mild Pain (1 - 3), Moderate Pain (4 - 6)  aluminum hydroxide/magnesium hydroxide/simethicone Suspension 30 milliLiter(s) Oral every 6 hours PRN Dyspepsia  haloperidol     Tablet 5 milliGRAM(s) Oral every 6 hours PRN moderate agitation  hydrOXYzine hydrochloride 50 milliGRAM(s) Oral every 6 hours PRN Anxiety  magnesium hydroxide Suspension 30 milliLiter(s) Oral daily PRN Constipation  QUEtiapine 25 milliGRAM(s) Oral every 6 hours PRN midl agitation  
MEDICATIONS  (STANDING):  QUEtiapine 25 milliGRAM(s) Oral at bedtime    MEDICATIONS  (PRN):  acetaminophen     Tablet .. 650 milliGRAM(s) Oral every 6 hours PRN Temp greater or equal to 38C (100.4F), Mild Pain (1 - 3), Moderate Pain (4 - 6)  aluminum hydroxide/magnesium hydroxide/simethicone Suspension 30 milliLiter(s) Oral every 6 hours PRN Dyspepsia  haloperidol     Tablet 5 milliGRAM(s) Oral every 6 hours PRN moderate agitation  hydrOXYzine hydrochloride 50 milliGRAM(s) Oral every 6 hours PRN Anxiety  magnesium hydroxide Suspension 30 milliLiter(s) Oral daily PRN Constipation  QUEtiapine 25 milliGRAM(s) Oral every 6 hours PRN mild agitation

## 2024-08-28 NOTE — BH DISCHARGE NOTE NURSING/SOCIAL WORK/PSYCH REHAB - NSBHCRISISRESOURCESOTHER_PSY_ALL_CORE_FT
Please contact Dr. Langston for medication or treatment questions, lab results or any other concerns at [phone]. Handoff provided to your outpatient provider, Dr. Hale.  If needed, please contact [facility], 24/7 days a week and speak with Fe Cummins RN Nurse manager or any [facility] staff member @ [phone].  Please return to the ED if symptoms worsen or return.

## 2024-08-28 NOTE — BH INPATIENT PSYCHIATRY PROGRESS NOTE - NSTXDEPRESINTERMD_PSY_ALL_CORE
1. Continue to observe pt on no standing psychiatric meds s/p pt recent suicide gesture via an OD Wellbutrin tabs prior to admission  2. Encourage the pt to participate in therapy groups as tolerated  3.To begin grief counselling inpatient and outpatient as per pt wish and per the pt's ability to tolerate sadness  4.Collateral clinical pt history with pt having given permission to speak with her outpatient psychiatrist and with the pt's chino Ramesh  5.Disposition planning ongoing with pt staff support to work on coping skills and safety planning 

## 2024-08-28 NOTE — BH INPATIENT PSYCHIATRY PROGRESS NOTE - NSBHCHARTREVIEWINVESTIGATE_PSY_A_CORE FT
Ventricular Rate 72 BPM    Atrial Rate 72 BPM    P-R Interval 168 ms    QRS Duration 70 ms    Q-T Interval 408 ms    QTC Calculation(Bazett) 446 ms    P Axis 44 degrees    R Axis 65 degrees    T Axis 61 degrees    Diagnosis Line Normal sinus rhythm  Normal ECG  No previous ECGs available  Confirmed by Palla MD, Vinny 64) on 8/22/2024 7:16:38 AM  

## 2024-08-28 NOTE — BH INPATIENT PSYCHIATRY PROGRESS NOTE - NSBHASSESSSUMMFT_PSY_ALL_CORE
Pt is a 23 yo female, non-caregiver, domiciled with boyfriend, PMHx significant for venous thromboembolism, mitral valvulopathy and prediabetes, had miscarriage at 33 weeks on Mothers day this year. PPHx autism spectrum disorder, major depressive vs unspecified bipolar disorder, unspecified anxiety, ADHD, PTSD and borderline personality disorder. Multiple prior psych admissions, 2 prior interrupted suicide attempts (overdose attempt in 2016 where a friend took the bottle of pills away & trying to drown self in bathtub in 2019 where mother had police break the door down), no known aggression or h/o legal issues, no known substance misuse, in outpatient treatment with a an individual [facility]. Who presents to the ED BIB with boyfriend after she ingested 4 Wellbutrin (as per boyfriend patient tried to take the whole bottle) in a suicide attempt. Psychiatry consulted for evaluation.    Patient presents s/p self harm vs. suicide attempt in the context of grief r/t loss of baby at 33 weeks. She has been increasingly depressed, not functioning and psychiatrically decompensating. These symptoms represent a change from baseline from which the patient cannot be reasonably expected to improve with current level of care. The patient presents with risk requiring inpatient psychiatric hospitalization for safety and stabilization.  8/24/23  Patient does not want Haldol and agrees to take Seroquel at night so she can sleep better for her mood.  She reports being anxious during current treatment.  8/25/24:  8/26/24Patient reports that she slept well.  There is no side effects reported or observed.  She is asking to leave put on Wellbutrin.  She is also asking about discharge process.  That discussion was deferred to her primary provider.  8/27/24 Pt seen and discussed with treatment team. The pt continues to appear to have made a rapid recovery from her initial affective dysregulation and mood instability. The pt reports good sleep with low dose Seroquel with plan to continue this simple and clinically effective pt med regimen. The pt is future oriented and eager for DC from hospital and return home . The pt continues to deny any current SI /HI /AH.   
Pt is a 25 yo female, non-caregiver, domiciled with boyfriend, PMHx significant for venous thromboembolism, mitral valvulopathy and prediabetes, had miscarriage at 33 weeks on Mothers day this year. PPHx autism spectrum disorder, major depressive vs unspecified bipolar disorder, unspecified anxiety, ADHD, PTSD and borderline personality disorder. Multiple prior psych admissions, 2 prior interrupted suicide attempts (overdose attempt in 2016 where a friend took the bottle of pills away & trying to drown self in bathtub in 2019 where mother had police break the door down), no known aggression or h/o legal issues, no known substance misuse, in outpatient treatment with a an individual [facility]. Who presents to the ED BIB with boyfriend after she ingested 4 Wellbutrin (as per boyfriend patient tried to take the whole bottle) in a suicide attempt. Psychiatry consulted for evaluation.    Patient presents s/p self harm vs. suicide attempt in the context of grief r/t loss of baby at 33 weeks. She has been increasingly depressed, not functioning and psychiatrically decompensating. These symptoms represent a change from baseline from which the patient cannot be reasonably expected to improve with current level of care. The patient presents with risk requiring inpatient psychiatric hospitalization for safety and stabilization.  8/24/23  Patient does not want Haldol and agrees to take Seroquel at night so she can sleep better for her mood.  She reports being anxious during current treatment.  8/25/24:  8/26/24Patient reports that she slept well.  There is no side effects reported or observed.  She is asking to leave put on Wellbutrin.  She is also asking about discharge process.  That discussion was deferred to her primary provider.  8/27/24 Pt seen and discussed with treatment team. The pt continues to appear to have made a rapid recovery from her initial affective dysregulation and mood instability. The pt reports good sleep with low dose Seroquel with plan to continue this simple and clinically effective pt med regimen. The pt is future oriented and eager for DC from hospital and return home . The pt continues to deny any current SI /HI /AH.   
Pt is a 23 yo female, non-caregiver, domiciled with boyfriend, PMHx significant for venous thromboembolism, mitral valvulopathy and prediabetes, had miscarriage at 33 weeks on Mothers day this year. PPHx autism spectrum disorder, major depressive vs unspecified bipolar disorder, unspecified anxiety, ADHD, PTSD and borderline personality disorder. Multiple prior psych admissions, 2 prior interrupted suicide attempts (overdose attempt in 2016 where a friend took the bottle of pills away & trying to drown self in bathtub in 2019 where mother had police break the door down), no known aggression or h/o legal issues, no known substance misuse, in outpatient treatment with a an individual [facility]. Who presents to the ED BIB with boyfriend after she ingested 4 Wellbutrin (as per boyfriend patient tried to take the whole bottle) in a suicide attempt. Psychiatry consulted for evaluation.    Patient presents s/p self harm vs. suicide attempt in the context of grief r/t loss of baby at 33 weeks. She has been increasingly depressed, not functioning and psychiatrically decompensating. These symptoms represent a change from baseline from which the patient cannot be reasonably expected to improve with current level of care. The patient presents with risk requiring inpatient psychiatric hospitalization for safety and stabilization.  8/24/23"  Patient does not want Haldol and agrees to take Seroquel at night so she can sleep better for her mood.  She reports being anxious during current treatment.  8/25/24:  Patient reports that she slept well.  There is no side effects reported or observed.  She is asking to leave put on Wellbutrin.  She is also asking about discharge process.  That discussion was deferred to her primary provider.
Pt is a 25 yo female, non-caregiver, domiciled with boyfriend, PMHx significant for venous thromboembolism, mitral valvulopathy and prediabetes, had miscarriage at 33 weeks on Mothers day this year. PPHx autism spectrum disorder, major depressive vs unspecified bipolar disorder, unspecified anxiety, ADHD, PTSD and borderline personality disorder. Multiple prior psych admissions, 2 prior interrupted suicide attempts (overdose attempt in 2016 where a friend took the bottle of pills away & trying to drown self in bathtub in 2019 where mother had police break the door down), no known aggression or h/o legal issues, no known substance misuse, in outpatient treatment with a an individual [facility]. Who presents to the ED BIB with boyfriend after she ingested 4 Wellbutrin (as per boyfriend patient tried to take the whole bottle) in a suicide attempt. Psychiatry consulted for evaluation.    Patient presents s/p self harm vs. suicide attempt in the context of grief r/t loss of baby at 33 weeks. She has been increasingly depressed, not functioning and psychiatrically decompensating. These symptoms represent a change from baseline from which the patient cannot be reasonably expected to improve with current level of care. The patient presents with risk requiring inpatient psychiatric hospitalization for safety and stabilization.      8/24/23"  Patient does not want Haldol and agrees to take Seroquel at night so she can sleep better for her mood.  She reports being anxious during current treatment.  
Pt is a 23 yo female, non-caregiver, domiciled with boyfriend, PMHx significant for venous thromboembolism, mitral valvulopathy and prediabetes, had miscarriage at 33 weeks on Mothers day this year. PPHx autism spectrum disorder, major depressive vs unspecified bipolar disorder, unspecified anxiety, ADHD, PTSD and borderline personality disorder. Multiple prior psych admissions, 2 prior interrupted suicide attempts (overdose attempt in 2016 where a friend took the bottle of pills away & trying to drown self in bathtub in 2019 where mother had police break the door down), no known aggression or h/o legal issues, no known substance misuse, in outpatient treatment with a an individual therapist [facility]. Who presents to the ED BIB with boyfriend after she ingested 4 Wellbutrin (as per boyfriend patient tried to take the whole bottle) in a suicide attempt. Psychiatry consulted for evaluation.    Patient presents s/p self harm vs. suicide attempt in the context of grief r/t loss of baby at 33 weeks. She has been increasingly depressed, not functioning and psychiatrically decompensating. These symptoms represent a change from baseline from which the patient cannot be reasonably expected to improve with current level of care. The patient presents with risk requiring inpatient psychiatric hospitalization for safety and stabilization.  8/24/23"  Patient does not want Haldol and agrees to take Seroquel at night so she can sleep better for her mood.  She reports being anxious during current treatment.  8/25/24:  Patient reports that she slept well.  There is no side effects reported or observed.  She is asking to leave put on Wellbutrin.  She is also asking about discharge process.  That discussion was deferred to her primary provider.
Pt is a 23 yo female, non-caregiver, domiciled with boyfriend, PMHx significant for venous thromboembolism, mitral valvulopathy and prediabetes, had miscarriage at 33 weeks on Mothers day this year. PPHx autism spectrum disorder, major depressive vs unspecified bipolar disorder, unspecified anxiety, ADHD, PTSD and borderline personality disorder. Multiple prior psych admissions, 2 prior interrupted suicide attempts (overdose attempt in 2016 where a friend took the bottle of pills away & trying to drown self in bathtub in 2019 where mother had police break the door down), no known aggression or h/o legal issues, no known substance misuse, in outpatient treatment with a an individual [facility]. Who presents to the ED BIB with boyfriend after she ingested 4 Wellbutrin (as per boyfriend patient tried to take the whole bottle) in a suicide attempt. Psychiatry consulted for evaluation.    Patient presents s/p self harm vs. suicide attempt in the context of grief r/t loss of baby at 33 weeks. She has been increasingly depressed, not functioning and psychiatrically decompensating. These symptoms represent a change from baseline from which the patient cannot be reasonably expected to improve with current level of care. The patient presents with risk requiring inpatient psychiatric hospitalization for safety and stabilization.  8/24/23"  Patient does not want Haldol and agrees to take Seroquel at night so she can sleep better for her mood.  She reports being anxious during current treatment.  8/25/24:  Patient reports that she slept well.  There is no side effects reported or observed.  She is asking to leave put on Wellbutrin.  She is also asking about discharge process.  That discussion was deferred to her primary provider.

## 2024-08-28 NOTE — BH INPATIENT PSYCHIATRY PROGRESS NOTE - NSBHCHARTREVIEWVS_PSY_A_CORE FT
Vital Signs Last 24 Hrs  T(C): 36.8 (08-27-24 @ 08:26), Max: 36.8 (08-27-24 @ 08:26)  T(F): 98.3 (08-27-24 @ 08:26), Max: 98.3 (08-27-24 @ 08:26)  HR: --  BP: --  BP(mean): --  RR: 18 (08-27-24 @ 08:26) (18 - 18)  SpO2: 99% (08-27-24 @ 08:26) (99% - 99%)    Orthostatic VS  08-27-24 @ 08:26  Lying BP: --/-- HR: --  Sitting BP: 101/62 HR: 83  Standing BP: 110/62 HR: 94  Site: upper right arm  Mode: electronic  Orthostatic VS  08-26-24 @ 07:27  Lying BP: --/-- HR: --  Sitting BP: 114/69 HR: 80  Standing BP: 102/64 HR: 95  Site: upper right arm  Mode: electronic  
Vital Signs Last 24 Hrs  T(C): 36.9 (08-28-24 @ 07:03), Max: 36.9 (08-28-24 @ 07:03)  T(F): 98.4 (08-28-24 @ 07:03), Max: 98.4 (08-28-24 @ 07:03)  HR: --  BP: --  BP(mean): --  RR: 18 (08-28-24 @ 07:03) (18 - 18)  SpO2: 100% (08-28-24 @ 07:03) (100% - 100%)    Orthostatic VS  08-28-24 @ 07:03  Lying BP: --/-- HR: --  Sitting BP: 107/57 HR: 89  Standing BP: 105/54 HR: 100  Site: upper right arm  Mode: electronic  Orthostatic VS  08-27-24 @ 08:26  Lying BP: --/-- HR: --  Sitting BP: 101/62 HR: 83  Standing BP: 110/62 HR: 94  Site: upper right arm  Mode: electronic  
Vital Signs Last 24 Hrs  T(C): 36.8 (08-27-24 @ 08:26), Max: 36.8 (08-27-24 @ 08:26)  T(F): 98.3 (08-27-24 @ 08:26), Max: 98.3 (08-27-24 @ 08:26)  HR: --  BP: --  BP(mean): --  RR: 18 (08-27-24 @ 08:26) (18 - 18)  SpO2: 99% (08-27-24 @ 08:26) (99% - 99%)    Orthostatic VS  08-27-24 @ 08:26  Lying BP: --/-- HR: --  Sitting BP: 101/62 HR: 83  Standing BP: 110/62 HR: 94  Site: upper right arm  Mode: electronic  Orthostatic VS  08-26-24 @ 07:27  Lying BP: --/-- HR: --  Sitting BP: 114/69 HR: 80  Standing BP: 102/64 HR: 95  Site: upper right arm  Mode: electronic  
Vital Signs Last 24 Hrs  T(C): 36.7 (08-26-24 @ 07:27), Max: 36.7 (08-26-24 @ 07:27)  T(F): 98.1 (08-26-24 @ 07:27), Max: 98.1 (08-26-24 @ 07:27)  HR: --  BP: --  BP(mean): --  RR: 18 (08-26-24 @ 07:27) (18 - 18)  SpO2: 100% (08-26-24 @ 07:27) (100% - 100%)    Orthostatic VS  08-26-24 @ 07:27  Lying BP: --/-- HR: --  Sitting BP: 114/69 HR: 80  Standing BP: 102/64 HR: 95  Site: upper right arm  Mode: electronic  Orthostatic VS  08-25-24 @ 07:14  Lying BP: --/-- HR: --  Sitting BP: 111/72 HR: 95  Standing BP: 107/64 HR: 102  Site: upper right arm  Mode: electronic  
Vital Signs Last 24 Hrs  T(C): 36.8 (08-25-24 @ 07:14), Max: 36.8 (08-25-24 @ 07:14)  T(F): 98.2 (08-25-24 @ 07:14), Max: 98.2 (08-25-24 @ 07:14)  HR: --  BP: --  BP(mean): --  RR: 18 (08-25-24 @ 07:14) (18 - 18)  SpO2: 98% (08-25-24 @ 07:14) (98% - 98%)    Orthostatic VS  08-25-24 @ 07:14  Lying BP: --/-- HR: --  Sitting BP: 111/72 HR: 95  Standing BP: 107/64 HR: 102  Site: upper right arm  Mode: electronic  Orthostatic VS  08-24-24 @ 07:03  Lying BP: --/-- HR: --  Sitting BP: 110/65 HR: 84  Standing BP: 102/62 HR: 99  Site: upper right arm  Mode: electronic  
Vital Signs Last 24 Hrs  T(C): 36.5 (08-24-24 @ 07:03), Max: 36.5 (08-24-24 @ 07:03)  T(F): 97.7 (08-24-24 @ 07:03), Max: 97.7 (08-24-24 @ 07:03)  HR: --  BP: --  BP(mean): --  RR: 18 (08-24-24 @ 07:03) (18 - 18)  SpO2: 98% (08-24-24 @ 07:03) (98% - 98%)    Orthostatic VS  08-24-24 @ 07:03  Lying BP: --/-- HR: --  Sitting BP: 110/65 HR: 84  Standing BP: 102/62 HR: 99  Site: upper right arm  Mode: electronic  Orthostatic VS  08-23-24 @ 07:04  Lying BP: --/-- HR: --  Sitting BP: 102/61 HR: 88  Standing BP: 120/94 HR: 108  Site: upper right arm  Mode: electronic  Orthostatic VS  08-22-24 @ 17:42  Lying BP: --/-- HR: --  Sitting BP: 124/62 HR: 82  Standing BP: 108/60 HR: 89  Site: upper left arm  Mode: electronic

## 2024-08-28 NOTE — BH DISCHARGE NOTE NURSING/SOCIAL WORK/PSYCH REHAB - NSDCADDINFO2FT_PSY_ALL_CORE
You had previously scheduled a appt with a new therapist prior to your admission. Please ensure you keep your 9/12 appt or contact [facility] via their raymond.

## 2024-08-28 NOTE — BH INPATIENT PSYCHIATRY PROGRESS NOTE - NSTXDEPRESGOAL_PSY_ALL_CORE
Report using a coping skill to overcome sadness and worry in order to socialize with peers daily
Exhibit improvements in self-grooming, hygiene, sleep and appetite
Report using a coping skill to overcome sadness and worry in order to socialize with peers daily
Report using a coping skill to overcome sadness and worry in order to socialize with peers daily
Exhibit improvements in self-grooming, hygiene, sleep and appetite
Report using a coping skill to overcome sadness and worry in order to socialize with peers daily

## 2024-08-28 NOTE — BH INPATIENT PSYCHIATRY PROGRESS NOTE - NSBHMSETHTCONTENT_PSY_A_CORE
Unremarkable/Preoccupations/Ruminations

## 2024-08-28 NOTE — BH INPATIENT PSYCHIATRY PROGRESS NOTE - GENERAL APPEARANCE
Deformities present

## 2024-08-28 NOTE — BH INPATIENT PSYCHIATRY PROGRESS NOTE - NSBHFUPINTERVALHXFT_PSY_A_CORE
Covering MD Note ( 8/24/24)PT is in bed, Casually dressed, talkative, cooperative, Makes good eye contact. She reports she responded well to Seroquel slept all night.  There is no side effects observed or reported by patient.  Patient also says she would like to go on Wellbutrin because that was her medication she was taking at home.  Covering MD Note ( 8/25/24)She continues to be depressed and anxious however she reports feeling better and started talking about being discharged.  Denies hearing voices seeing things and denies thoughts of harming herself.  8/26/24 Pt seen and discussed with treatment team. The pt continues to clinically improve with now pt reported good sleep and appetite and no further c/o depressed mood or SI. The pt has been attending to her ADLs and she has been visible on the unit with a similar aged peer group. The pt continues to journal and to attend therapy groups with pt focus on CBT , DBT and mindfulness techniques to further aid with safety planning. The pt is tolerating her low dose hs Seroquel 25 mg po qhs with good clinical effect and with no reported adverse effects. The pt continues to deny any SI /HI /AH and she continues to present as a low risk for suicide or for impulsive aggression at this time. Disposition planning is ongoing with plan for the pt to continue with current streamlined  and effective med regimen.        Covering MD Note ( 8/24/24)PT is in bed, Casually dressed, talkative, cooperative, Makes good eye contact. She reports she responded well to Seroquel slept all night.  There is no side effects observed or reported by patient.  Patient also says she would like to go on Wellbutrin because that was her medication she was taking at home.  Covering MD Note ( 8/25/24)She continues to be depressed and anxious however she reports feeling better and started talking about being discharged.  Denies hearing voices seeing things and denies thoughts of harming herself.  8/26/24 Pt seen and discussed with treatment team. The pt continues to clinically improve with now pt reported good sleep and appetite and no further c/o depressed mood or SI. The pt has been attending to her ADLs and she has been visible on the unit with a similar aged peer group. The pt continues to journal and to attend therapy groups with pt focus on CBT , DBT and mindfulness techniques to further aid with safety planning. The pt is tolerating her low dose hs Seroquel 25 mg po qhs with good clinical effect and with no reported adverse effects. The pt continues to deny any SI /HI /AH and she continues to present as a low risk for suicide or for impulsive aggression at this time. Disposition planning is ongoing with plan for the pt to continue with current streamlined  and effective med regimen.   8/28/24 Pt seen and discussed with team. Writer spoke with Dr Hale the pt's child and adolescent outpatient psychiatrist who has been treating the pt since 2018. Pt clinical emotional reconstitution discussed and plan for the pt to continue low dose hs Seroquel 25 mg po qhs was discussed. The pt reported sleeping and eating well and she continues to be socially interactive with select similar aged peers. The pt denies any current SI /HI/AH and continues to present a low risk for suicide or for impulsive aggression.

## 2024-08-28 NOTE — BH INPATIENT PSYCHIATRY PROGRESS NOTE - NSBHMSEAFFQUAL_PSY_A_CORE
Depressed/Anxious

## 2024-08-28 NOTE — BH INPATIENT PSYCHIATRY PROGRESS NOTE - NSICDXBHTERTIARYDX_PSY_ALL_CORE
R/O Dissociative identity disorder   F44.81  

## 2024-08-29 VITALS — OXYGEN SATURATION: 98 % | RESPIRATION RATE: 16 BRPM | TEMPERATURE: 98 F

## 2024-08-29 PROCEDURE — 99239 HOSP IP/OBS DSCHRG MGMT >30: CPT

## 2024-08-29 NOTE — BH TREATMENT PLAN - NSTXPLANTHERAPYSESSIONSFT_PSY_ALL_CORE
08-23-24  Type of therapy: Safety planning  Type of session: Individual  Level of patient participation: Participates  Duration of participation: 15 minutes  Therapy conducted by: Psych rehab  Therapy Summary: Pt completed safety plan with writer    08-23-24  Type of therapy: Mindfulness  Type of session: Group  Level of patient participation: Participates  Duration of participation: 45 minutes  Therapy conducted by: Psych rehab  Therapy Summary: Pt attended meditation group. Pt was cooperative and pleasant. Pt was able to identify the benifits of meditation.    08-27-24  Type of therapy: Stress management  Type of session: Group  Level of patient participation: Participates  Duration of participation: 45 minutes  Therapy conducted by: Psych rehab  Therapy Summary: Pt attending group sessions, engaged in art therapy and stress management group. Pt is social with peers  
  08-23-24  Type of therapy: Safety planning  Type of session: Individual  Level of patient participation: Participates  Duration of participation: 15 minutes  Therapy conducted by: Psych rehab  Therapy Summary: Pt completed safety plan with writer    08-23-24  Type of therapy: Mindfulness  Type of session: Group  Level of patient participation: Participates  Duration of participation: 45 minutes  Therapy conducted by: Psych rehab  Therapy Summary: Pt attended meditation group. Pt was cooperative and pleasant. Pt was able to identify the benifits of meditation.

## 2024-08-29 NOTE — BH TREATMENT PLAN - NSTXDEPRESINTERRN_PSY_ALL_CORE
Provide a safe/therapeutic environment  Provide emotional support  Assess mood every shift  Establish a trusting/therapeutic relationship with patient  Encourage patient to verbalize thoughts and feelings  Provide medication education and encourage compliance  Encourage patient to engage in unit therapies/groups

## 2024-08-29 NOTE — BH INPATIENT PSYCHIATRY DISCHARGE NOTE - NSDCCPCAREPLAN_GEN_ALL_CORE_FT
PRINCIPAL DISCHARGE DIAGNOSIS  Diagnosis: Severe episode of recurrent major depressive disorder, without psychotic features  Assessment and Plan of Treatment:

## 2024-08-29 NOTE — BH TREATMENT PLAN - NSDCCRITERIA_PSY_ALL_CORE
acute resolution of the pt's more acute worsening of anxious depression   ongoing unresolved grief counselling  updated safety planning and pt ongoing work on coping skills and safety planning in and out of hospital
acute resolution of the pt's more acute worsening of anxious depression   ongoing unresolved grief counselling  updated safety planning and pt ongoing work on coping skills and safety planning in and out of hospital

## 2024-08-29 NOTE — BH INPATIENT PSYCHIATRY DISCHARGE NOTE - NSBHFUPINTERVALHXFT_PSY_A_CORE
Covering MD Note ( 8/24/24)PT is in bed, Casually dressed, talkative, cooperative, Makes good eye contact. She reports she responded well to Seroquel slept all night.  There is no side effects observed or reported by patient.  Patient also says she would like to go on Wellbutrin because that was her medication she was taking at home.  Covering MD Note ( 8/25/24)She continues to be depressed and anxious however she reports feeling better and started talking about being discharged.  Denies hearing voices seeing things and denies thoughts of harming herself.  8/26/24 Pt seen and discussed with treatment team. The pt continues to clinically improve with now pt reported good sleep and appetite and no further c/o depressed mood or SI. The pt has been attending to her ADLs and she has been visible on the unit with a similar aged peer group. The pt continues to journal and to attend therapy groups with pt focus on CBT , DBT and mindfulness techniques to further aid with safety planning. The pt is tolerating her low dose hs Seroquel 25 mg po qhs with good clinical effect and with no reported adverse effects. The pt continues to deny any SI /HI /AH and she continues to present as a low risk for suicide or for impulsive aggression at this time. Disposition planning is ongoing with plan for the pt to continue with current streamlined  and effective med regimen.   8/28/24 Pt seen and discussed with team. Writer spoke with Dr Hale the pt's child and adolescent outpatient psychiatrist who has been treating the pt since 2018. Pt clinical emotional reconstitution discussed and plan for the pt to continue low dose hs Seroquel 25 mg po qhs was discussed. The pt reported sleeping and eating well and she continues to be socially interactive with select similar aged peers. The pt denies any current SI /HI/AH and continues to present a low risk for suicide or for impulsive aggression.   8/29/24 Pt seen prior to DC home. Clinically improving. Pt denies SI /HI /AH  Clinical handoff completed. Medications e-prescribed to [facility] in [place]. Pt denies current SI /HI and presents a low risk for impulsive aggression. Pt to follow up with Dr Hale on 9/5/24 at 12 noon. Pt aware she can contact Dr Langston at [facility] at [phone]  with any further questions.

## 2024-08-29 NOTE — BH INPATIENT PSYCHIATRY DISCHARGE NOTE - HOSPITAL COURSE
Pt is a 23 yo female, non-caregiver, domiciled with boyfriend, PMHx significant for venous thromboembolism, mitral valvulopathy and prediabetes, had miscarriage at 33 weeks on Mothers day this year. PPHx autism spectrum disorder, major depressive vs unspecified bipolar disorder, unspecified anxiety, ADHD, PTSD and borderline personality disorder. Multiple prior psych admissions, 2 prior interrupted suicide attempts (overdose attempt in 2016 where a friend took the bottle of pills away & trying to drown self in bathtub in 2019 where mother had police break the door down), no known aggression or h/o legal issues, no known substance misuse, in outpatient treatment with a an individual [facility]. Who presents to the ED BIB with boyfriend after she ingested 4 Wellbutrin (as per boyfriend patient tried to take the whole bottle) in a suicide attempt. Psychiatry consulted for evaluation.    Patient presents s/p self harm vs. suicide attempt in the context of grief r/t loss of baby at 33 weeks. She has been increasingly depressed, not functioning and psychiatrically decompensating. These symptoms represent a change from baseline from which the patient cannot be reasonably expected to improve with current level of care. The patient presents with risk requiring inpatient psychiatric hospitalization for safety and stabilization.  8/24/23"  Patient does not want Haldol and agrees to take Seroquel at night so she can sleep better for her mood.  She reports being anxious during current treatment.  8/25/24:  Patient reports that she slept well.  There is no side effects reported or observed.  She is asking to leave put on Wellbutrin.  She is also asking about discharge process.  That discussion was deferred to her primary provider.  acute resolution of the pt's more acute worsening of anxious depression   ongoing unresolved grief counselling  updated safety planning and pt ongoing work on coping skills and safety planning in and out of hospital  danger to self; mental illness expected to respond to inpatient care  suicidal behavior

## 2024-08-29 NOTE — BH INPATIENT PSYCHIATRY DISCHARGE NOTE - HPI (INCLUDE ILLNESS QUALITY, SEVERITY, DURATION, TIMING, CONTEXT, MODIFYING FACTORS, ASSOCIATED SIGNS AND SYMPTOMS)
The pt is a  24 year-old non binary adopted   female, non-caregiver, domiciled with her  boyfriend of 6 years, PMH significant for venous thromboembolism, mitral valvuloplasty at age 19 and prediabetes, who  had a miscarriage at 33 weeks on Mothers day this year. Past psychiatric h/o  autism spectrum disorder, major depressive vs unspecified bipolar disorder, unspecified anxiety, ADHD, PTSD, Dissociative Identity d/o  and borderline personality disorder. Multiple prior psych admissions, 2 prior interrupted suicide attempts (overdose attempt in 2016 where a friend took the bottle of pills away & trying to drown self in bathtub in 2019 where mother had police break the door down), no known aggression or h/o legal issues, no known substance misuse, in outpatient treatment with a an individual therapist [facility].  The pt presented to the [facility] ED on 8/22/24 with her  boyfriend after she reportedly ingested 4 Wellbutrin (as per boyfriend patient tried to take the whole bottle) in a suicide attempt. Psychiatry consulted for evaluation.  Per the initial ED evaluation on 8/22/24:  Patients depressed, with blunted affect, reports she has felt horrible since she miscarried at 33 weeks on mothers day this year. She reports yesterday she was out and saw baby clothes and items and this made her miss the baby and want to hold her. Around midnight patient admits to taking 4 pills of Wellbutrin to "feel numb" she denies this was a suicide attempt but per chart notes she was attempted to take the whole bottle per boyfriend. She reports she has no coping skills, is trying to get connected to grief counseling, has a psychiatrist but only for medication management. She reports feeling the worst she ever has and cannot deal with this grief. She displays poor insight to situation and does not want psychiatric hospitalization despite being unable to connect to care to help her through this loss.   The pt was then admitted to [address] inpatient psychiatry on 8/22/24 on a 9.39 emergency legal status for acute pt safety and for ongoing pt evaluation and treatment of her longstanding affective dysregulation .

## 2024-08-29 NOTE — BH INPATIENT PSYCHIATRY DISCHARGE NOTE - NSBHDCBILLING_PSY_ALL_CORE
no lesions Nephrology Progress note    Patient is a 78y male admitted  for fever, low grade temp this morning.    Allergies:  grass, pollen (Rhinitis)  No Known Drug Allergies    Hospital Medications:   MEDICATIONS  (STANDING):  artificial  tears Solution 1 Drop(s) Left EYE every 4 hours  aspirin enteric coated 81 milliGRAM(s) Oral daily  buMETAnide 1 milliGRAM(s) Oral daily  carvedilol 25 milliGRAM(s) Oral every 12 hours  cefTRIAXone   IVPB 1000 milliGRAM(s) IV Intermittent every 24 hours  chlorhexidine 2% Cloths 1 Application(s) Topical <User Schedule>  heparin   Injectable 5000 Unit(s) SubCutaneous every 8 hours  petrolatum Ophthalmic Ointment 1 Application(s) Left EYE at bedtime  sevelamer carbonate 800 milliGRAM(s) Oral three times a day with meals  tamsulosin 0.4 milliGRAM(s) Oral at bedtime    VITALS:  T(F): 98.4 (10-09-21 @ 13:32), Max: 102.3 (10-08-21 @ 19:53)  HR: 61 (10-09-21 @ 13:32)  BP: 104/52 (10-09-21 @ 13:32)  RR: 18 (10-09-21 @ 13:32)  SpO2: 98% (10-09-21 @ 13:32)      10-07 @ 07:  -  10-08 @ 07:00  --------------------------------------------------------  IN: 1250 mL / OUT: 0 mL / NET: 1250 mL    10-08 @ 07:  -  10-09 @ 07:00  --------------------------------------------------------  IN: 730 mL / OUT: 0 mL / NET: 730 mL        PHYSICAL EXAM:  Constitutional: NAD  Neck:  No JVD  Respiratory: CTAB/L  Cardiovascular: S1 and S2  Gastrointestinal: BS+, soft, NT/ND  Extremities: No peripheral edema  Neurological: A/O x 3, no focal deficits  Psychiatric: Normal mood, normal affect  : No Aguilar  Skin: No rashes  Access: perm cath and LT AVF, +  thrill    LABS:  10-08    137  |  95<L>  |  48<H>  ----------------------------<  97  3.9   |  21<L>  |  9.57<H>    Ca    8.8      08 Oct 2021 06:15                         9.9    7.46  )-----------( 139      ( 08 Oct 2021 06:15 )             32.7       Urine Studies:  Urinalysis Basic - ( 06 Oct 2021 20:01 )    Color: Light Orange / Appearance: Turbid / S.009 / pH:   Gluc:  / Ketone: Negative  / Bili: Negative / Urobili: Negative   Blood:  / Protein: 100 mg/dL / Nitrite: Negative   Leuk Esterase: Large / RBC: 4 /hpf /  /HPF   Sq Epi:  / Non Sq Epi: 7 /hpf / Bacteria: Negative        RADIOLOGY & ADDITIONAL STUDIES:   51300 (Hospital discharge day management; more than 30 min)

## 2024-08-29 NOTE — BH INPATIENT PSYCHIATRY DISCHARGE NOTE - NSBHDCHANDOFFFT_PSY_ALL_CORE
8/28/24 Pt seen and discussed with team. Writer spoke with Dr Hale the pt's child and adolescent outpatient psychiatrist who has been treating the pt since 2018. Pt clinical emotional reconstitution discussed and plan for the pt to continue low dose hs Seroquel 25 mg po qhs was discussed. The pt reported sleeping and eating well and she continues to be socially interactive with select similar aged peers. The pt denies any current SI /HI/AH and continues to present a low risk for suicide or for impulsive aggression.   8/29/24 Pt seen prior to DC home. Clinically improving. Pt denies SI /HI /AH  Clinical handoff completed. Medications e-prescribed to [facility] in [place]. Pt denies current SI /HI and presents a low risk for impulsive aggression. Pt to follow up with Dr Hale on 9/5/24 at 12 noon. Pt aware she can contact Dr Langston at [facility] at [phone]  with any further questions

## 2024-08-29 NOTE — BH TREATMENT PLAN - NSCMSPTSTRENGTHS_PSY_ALL_CORE
Assertive/Courageous/Expressive of emotions/[place]/spirituality/Future/goal oriented/Highly motivated for treatment/Motivated/Physically healthy/Resourceful/Strong support system/Supportive family
Assertive/Courageous/Expressive of emotions/[place]/spirituality/Future/goal oriented/Highly motivated for treatment/Motivated/Physically healthy/Resourceful/Strong support system/Supportive family

## 2024-08-29 NOTE — BH TREATMENT PLAN - NSPTSTATEDGOAL_PSY_ALL_CORE
To begin grief counseling   "to work on feeling more level in my mood"
To begin grief counseling   "to work on feeling more level in my mood"

## 2024-08-29 NOTE — BH INPATIENT PSYCHIATRY DISCHARGE NOTE - NSBHASSESSSUMMFT_PSY_ALL_CORE
Pt is a 25 yo female, non-caregiver, domiciled with boyfriend, PMHx significant for venous thromboembolism, mitral valvulopathy and prediabetes, had miscarriage at 33 weeks on Mothers day this year. PPHx autism spectrum disorder, major depressive vs unspecified bipolar disorder, unspecified anxiety, ADHD, PTSD and borderline personality disorder. Multiple prior psych admissions, 2 prior interrupted suicide attempts (overdose attempt in 2016 where a friend took the bottle of pills away & trying to drown self in bathtub in 2019 where mother had police break the door down), no known aggression or h/o legal issues, no known substance misuse, in outpatient treatment with a an individual [facility]. Who presents to the ED BIB with boyfriend after she ingested 4 Wellbutrin (as per boyfriend patient tried to take the whole bottle) in a suicide attempt. Psychiatry consulted for evaluation.    Patient presents s/p self harm vs. suicide attempt in the context of grief r/t loss of baby at 33 weeks. She has been increasingly depressed, not functioning and psychiatrically decompensating. These symptoms represent a change from baseline from which the patient cannot be reasonably expected to improve with current level of care. The patient presents with risk requiring inpatient psychiatric hospitalization for safety and stabilization.  8/24/23"  Patient does not want Haldol and agrees to take Seroquel at night so she can sleep better for her mood.  She reports being anxious during current treatment.  8/25/24:  Patient reports that she slept well.  There is no side effects reported or observed.  She is asking to leave put on Wellbutrin.  She is also asking about discharge process.  That discussion was deferred to her primary provider.

## 2024-08-29 NOTE — BH INPATIENT PSYCHIATRY DISCHARGE NOTE - NSBHMETABOLIC_PSY_ALL_CORE_FT
BMI: BMI (kg/m2): 34.9 (08-22-24 @ 17:42)  HbA1c: A1C with Estimated Average Glucose Result: 5.2 % (08-23-24 @ 07:14)    Glucose:   BP: --Vital Signs Last 24 Hrs  T(C): 36.8 (08-29-24 @ 07:10), Max: 36.8 (08-29-24 @ 07:10)  T(F): 98.3 (08-29-24 @ 07:10), Max: 98.3 (08-29-24 @ 07:10)  HR: --  BP: --  BP(mean): --  RR: 16 (08-29-24 @ 07:10) (16 - 16)  SpO2: 98% (08-29-24 @ 07:10) (98% - 98%)    Orthostatic VS  08-29-24 @ 07:10  Lying BP: --/-- HR: --  Sitting BP: 101/61 HR: 85  Standing BP: 108/56 HR: 97  Site: upper right arm  Mode: electronic  Orthostatic VS  08-28-24 @ 07:03  Lying BP: --/-- HR: --  Sitting BP: 107/57 HR: 89  Standing BP: 105/54 HR: 100  Site: upper right arm  Mode: electronic    Lipid Panel: Date/Time: 08-23-24 @ 07:14  Cholesterol, Serum: 159  LDL Cholesterol Calculated: 90  HDL Cholesterol, Serum: 40  Total Cholesterol/HDL Ration Measurement: --  Triglycerides, Serum: 167

## 2024-08-29 NOTE — BH TREATMENT PLAN - NSTXDCOTHRINTERSW_PSY_ALL_CORE
SW met with pt and assessed for risk, pt denies SI/HI and feels ready for dc. SW reviewed dcp, pt expressed understanding. Pt’s BF to  @ 12pm, RN aware.
SW assessment completed. Pt interested in grief counseling and stated that her mother had found a therapist and was reaching out to arrange appt. SW reviewed role and explained that SW can assist with scheduling if pt was agreeable and pt is agreeable. Pt obtained therapist contact info, Igor Link @ [phone]. SW explained that when dc date is scheduled, SW to contact.

## 2024-08-29 NOTE — BH INPATIENT PSYCHIATRY DISCHARGE NOTE - NSBHDCRISKMITIGATEFT_PSY_ALL_CORE
8/28/24 Pt seen and discussed with team. Writer spoke with Dr Hale the pt's child and adolescent outpatient psychiatrist who has been treating the pt since 2018. Pt clinical emotional reconstitution discussed and plan for the pt to continue low dose hs Seroquel 25 mg po qhs was discussed. The pt reported sleeping and eating well and she continues to be socially interactive with select similar aged peers. The pt denies any current SI /HI/AH and continues to present a low risk for suicide or for impulsive aggression.   8/29/24 Pt seen prior to DC home. Clinically improving. Pt denies SI /HI /AH  Clinical handoff completed. Medications e-prescribed to [facility] in [place]. Pt denies current SI /HI and presents a low risk for impulsive aggression. Pt to follow up with Dr Hale on 9/5/24 at 12 noon. Pt aware she can contact Dr Langston at [facility] at [phone]  with any further questions

## 2024-08-29 NOTE — BH TREATMENT PLAN - NSTXDEPRESGOAL_PSY_ALL_CORE
Report using a coping skill to overcome sadness and worry in order to socialize with peers daily
Exhibit improvements in self-grooming, hygiene, sleep and appetite

## 2024-08-29 NOTE — BH TREATMENT PLAN - NSTXDEPRESINTERMD_PSY_ALL_CORE
1. Continue to observe pt on no standing psychiatric meds s/p pt recent suicide gesture via an OD Wellbutrin tabs prior to admission  2. Encourage the pt to participate in therapy groups as tolerated  3.To begin grief counselling inpatient and outpatient as per pt wish and per the pt's ability to tolerate sadness  4.Collateral clinical pt history with pt having given permission to speak with her outpatient psychiatrist and with the pt's chino Ramesh  5.Disposition planning ongoing with pt staff support to work on coping skills and safety planning 
1. Pt begun on low dose Seroquel 25 mg po qhs with good effect in alleviating affective dysregulation and improving insomnia s/p pt recent suicide gesture via an OD Wellbutrin tabs prior to admission  2. Encourage the pt to participate in therapy groups as tolerated  3.To begin grief counselling inpatient and outpatient as per pt wish and per the pt's ability to tolerate sadness  4.Collateral clinical pt history with pt having given permission to speak with her outpatient psychiatrist and with the pt's chino Ramesh  5.Disposition planning ongoing with pt staff support to work on coping skills and safety planning  with pt for DC from hospital on 8/29/24 with follow up with Dr Alexia romano on 9/5/24 at 12 noon

## 2024-08-29 NOTE — BH INPATIENT PSYCHIATRY DISCHARGE NOTE - NSDCPROCEDURESFT_PSY_ALL_CORE
TSH= 1.52  FASTING LIPIDS  CHOLESTEROL = 159  TG= 167  HgA1C= 5.2  EKG  VR= 72  QT/QTC= 408/446  HCG<1  No studies are pending

## 2024-08-30 NOTE — BH CONSULTATION LIAISON PROGRESS NOTE - NSBHMSEINSIGHT_PSY_A_CORE
----- Message from Sangita Callaway sent at 8/30/2024  6:47 AM CDT -----  Please call pt with results  A1c at 8.9,  Continue with current medicine  Continue with diet modifications    Normal for Kidney and liver functions.  Normal for blood sugar and electrolyte.    Follow up with PCP in 4- 6 months    thanks          
RN called and spoke with patient.    RN reviewed providers message.    Patient verbalized understanding.      Hiro Eddy RN, BSN, PHN  [facility]    
[place]

## 2024-09-02 NOTE — CHART NOTE - NSCHARTNOTEFT_GEN_A_CORE
Attempted outreach to follow up with patient after discharge. Left a voicemail requesting return call.

## 2024-09-06 DIAGNOSIS — Z86.718 PERSONAL HISTORY OF OTHER VENOUS THROMBOSIS AND EMBOLISM: ICD-10-CM

## 2024-09-06 DIAGNOSIS — Z91.041 RADIOGRAPHIC DYE ALLERGY STATUS: ICD-10-CM

## 2024-09-06 DIAGNOSIS — F41.9 ANXIETY DISORDER, UNSPECIFIED: ICD-10-CM

## 2024-09-06 DIAGNOSIS — F84.0 AUTISTIC DISORDER: ICD-10-CM

## 2024-09-06 DIAGNOSIS — F43.10 POST-TRAUMATIC STRESS DISORDER, UNSPECIFIED: ICD-10-CM

## 2024-09-06 DIAGNOSIS — F43.21 ADJUSTMENT DISORDER WITH DEPRESSED MOOD: ICD-10-CM

## 2024-09-06 DIAGNOSIS — R73.03 PREDIABETES: ICD-10-CM

## 2024-09-06 DIAGNOSIS — T43.292A POISONING BY OTHER ANTIDEPRESSANTS, INTENTIONAL SELF-HARM, INITIAL ENCOUNTER: ICD-10-CM

## 2024-09-06 DIAGNOSIS — Z91.51 PERSONAL HISTORY OF SUICIDAL BEHAVIOR: ICD-10-CM

## 2024-09-06 DIAGNOSIS — F33.2 MAJOR DEPRESSIVE DISORDER, RECURRENT SEVERE WITHOUT PSYCHOTIC FEATURES: ICD-10-CM

## 2024-09-06 DIAGNOSIS — F60.3 BORDERLINE PERSONALITY DISORDER: ICD-10-CM

## 2025-01-31 NOTE — PROGRESS NOTE BEHAVIORAL HEALTH - PROBLEM SELECTOR PROBLEM 2
Health Maintenance       Hepatitis B Vaccine (1 of 3 - 19+ 3-dose series)  Never done    Shingles Vaccine (1 of 2)  Never done    Pneumococcal Vaccine 50+ (1 of 1 - PCV)  Never done    Influenza Vaccine (1)  Never done    COVID-19 Vaccine (3 - 2024-25 season)  Overdue since 9/1/2024    Breast Cancer Screening (Every 2 Years)  Overdue since 10/7/2024           Following review of the above:  Patient is not proceeding with: Mammogram, COVID-19, Influenza, Pneumococcal, and Shingles    Note: Refer to final orders and clinician documentation.      
Borderline personality disorder

## 2025-03-08 NOTE — ED ADULT NURSE NOTE - CAS DISCH ACCOMP BY
Bed: 16  Expected date:   Expected time:   Means of arrival:   Comments:  EMS 50 - 69 yo M  Seizure      Self

## 2025-07-02 NOTE — ED ADULT NURSE NOTE - NS ED NURSE RECORD ANOTHER VITAL SIGN
Addressed in separate encounter. Pt picked up medication from [facility] at 1309 today.   Yes

## (undated) DIAGNOSIS — F33.2 SEVERE RECURRENT MAJOR DEPRESSION WITHOUT PSYCHOTIC FEATURES (HCC): ICD-10-CM

## (undated) DIAGNOSIS — F50.9 EATING DISORDER: ICD-10-CM

## (undated) DIAGNOSIS — R45.86 MOOD ALTERED: ICD-10-CM